# Patient Record
Sex: FEMALE | Race: WHITE | NOT HISPANIC OR LATINO | Employment: OTHER | URBAN - METROPOLITAN AREA
[De-identification: names, ages, dates, MRNs, and addresses within clinical notes are randomized per-mention and may not be internally consistent; named-entity substitution may affect disease eponyms.]

---

## 2023-02-15 PROBLEM — E21.3 HYPERPARATHYROIDISM (MULTI): Status: ACTIVE | Noted: 2023-02-15

## 2023-02-15 PROBLEM — J18.9 PNEUMONIA: Status: ACTIVE | Noted: 2023-02-15

## 2023-02-15 PROBLEM — I35.8 AORTIC HEART MURMUR: Status: ACTIVE | Noted: 2023-02-15

## 2023-02-15 PROBLEM — M54.9 BACK PAIN, CHRONIC: Status: ACTIVE | Noted: 2023-02-15

## 2023-02-15 PROBLEM — E11.9 DIABETES MELLITUS TYPE 2, CONTROLLED (MULTI): Status: ACTIVE | Noted: 2023-02-15

## 2023-02-15 PROBLEM — R51.9 HEADACHE: Status: ACTIVE | Noted: 2023-02-15

## 2023-02-15 PROBLEM — E83.52 HYPERCALCEMIA: Status: ACTIVE | Noted: 2023-02-15

## 2023-02-15 PROBLEM — G89.29 BACK PAIN, CHRONIC: Status: ACTIVE | Noted: 2023-02-15

## 2023-02-15 PROBLEM — R05.9 COUGH: Status: ACTIVE | Noted: 2023-02-15

## 2023-02-15 PROBLEM — D50.9 ANEMIA, IRON DEFICIENCY: Status: ACTIVE | Noted: 2023-02-15

## 2023-02-15 PROBLEM — R11.0 NAUSEA IN ADULT: Status: ACTIVE | Noted: 2023-02-15

## 2023-02-15 PROBLEM — I10 BENIGN ESSENTIAL HYPERTENSION: Status: ACTIVE | Noted: 2023-02-15

## 2023-02-15 PROBLEM — E78.1 HYPERTRIGLYCERIDEMIA: Status: ACTIVE | Noted: 2023-02-15

## 2023-02-15 PROBLEM — D64.9 ANEMIA: Status: ACTIVE | Noted: 2023-02-15

## 2023-02-15 PROBLEM — R09.89 BRUIT OF LEFT CAROTID ARTERY: Status: ACTIVE | Noted: 2023-02-15

## 2023-02-15 PROBLEM — I35.0 AORTIC STENOSIS: Status: ACTIVE | Noted: 2023-02-15

## 2023-02-15 PROBLEM — D68.9 COAGULOPATHY (MULTI): Status: ACTIVE | Noted: 2023-02-15

## 2023-02-15 PROBLEM — I63.9 CVA (CEREBRAL VASCULAR ACCIDENT) (MULTI): Status: ACTIVE | Noted: 2023-02-15

## 2023-02-15 PROBLEM — Z95.2 S/P AVR (AORTIC VALVE REPLACEMENT): Status: ACTIVE | Noted: 2023-02-15

## 2023-02-15 PROBLEM — R35.0 URINARY FREQUENCY: Status: ACTIVE | Noted: 2023-02-15

## 2023-02-15 PROBLEM — Z95.3 H/O HEART VALVE REPLACEMENT WITH BIOPROSTHETIC VALVE: Status: ACTIVE | Noted: 2023-02-15

## 2023-02-15 PROBLEM — R39.15 URINARY URGENCY: Status: ACTIVE | Noted: 2023-02-15

## 2023-02-15 PROBLEM — I48.0 PAROXYSMAL A-FIB (MULTI): Status: ACTIVE | Noted: 2023-02-15

## 2023-02-15 PROBLEM — G31.84 MILD COGNITIVE IMPAIRMENT: Status: ACTIVE | Noted: 2023-02-15

## 2023-02-15 PROBLEM — I25.10 CAD (CORONARY ARTERY DISEASE): Status: ACTIVE | Noted: 2023-02-15

## 2023-02-15 PROBLEM — R06.02 SHORTNESS OF BREATH: Status: ACTIVE | Noted: 2023-02-15

## 2023-02-15 PROBLEM — E78.01 FAMILIAL HYPERCHOLESTEREMIA: Status: ACTIVE | Noted: 2023-02-15

## 2023-02-15 PROBLEM — R79.89 LOW VITAMIN D LEVEL: Status: ACTIVE | Noted: 2023-02-15

## 2023-02-15 PROBLEM — R06.00 DYSPNEA: Status: ACTIVE | Noted: 2023-02-15

## 2023-02-15 PROBLEM — I35.1 AORTIC VALVE INSUFFICIENCY DUE TO INFECTION: Status: ACTIVE | Noted: 2023-02-15

## 2023-02-15 PROBLEM — H53.9 VISION CHANGES: Status: ACTIVE | Noted: 2023-02-15

## 2023-02-15 PROBLEM — I38 ENDOCARDITIS: Status: ACTIVE | Noted: 2023-02-15

## 2023-02-15 PROBLEM — F41.9 ANXIETY: Status: ACTIVE | Noted: 2023-02-15

## 2023-02-15 PROBLEM — N31.9 BLADDER DYSFUNCTION: Status: ACTIVE | Noted: 2023-02-15

## 2023-02-15 PROBLEM — I22.2 SUBSEQUENT NON-ST ELEVATION (NSTEMI) MYOCARDIAL INFARCTION (MULTI): Status: ACTIVE | Noted: 2023-02-15

## 2023-02-15 RX ORDER — FLUTICASONE PROPIONATE 50 UG/1
POWDER, METERED RESPIRATORY (INHALATION) DAILY
COMMUNITY
Start: 2021-04-14 | End: 2023-10-11 | Stop reason: SDUPTHER

## 2023-02-15 RX ORDER — OXYBUTYNIN CHLORIDE 5 MG/1
1 TABLET, EXTENDED RELEASE ORAL DAILY
COMMUNITY
Start: 2016-10-12 | End: 2023-03-29 | Stop reason: SDUPTHER

## 2023-02-15 RX ORDER — FLUTICASONE PROPIONATE 50 MCG
1 SPRAY, SUSPENSION (ML) NASAL DAILY PRN
COMMUNITY
Start: 2021-04-14 | End: 2023-10-11 | Stop reason: ALTCHOICE

## 2023-02-15 RX ORDER — PANTOPRAZOLE SODIUM 40 MG/1
1 TABLET, DELAYED RELEASE ORAL DAILY
COMMUNITY
Start: 2019-11-25 | End: 2023-10-11 | Stop reason: SDUPTHER

## 2023-02-15 RX ORDER — LISINOPRIL 20 MG/1
1 TABLET ORAL DAILY
COMMUNITY
Start: 2020-09-09 | End: 2023-10-11 | Stop reason: SINTOL

## 2023-02-15 RX ORDER — ROSUVASTATIN CALCIUM 40 MG/1
1 TABLET, COATED ORAL NIGHTLY
COMMUNITY
Start: 2021-11-16 | End: 2023-11-06

## 2023-02-15 RX ORDER — METOPROLOL TARTRATE 25 MG/1
0.5 TABLET, FILM COATED ORAL EVERY 12 HOURS
COMMUNITY
Start: 2020-09-09

## 2023-02-15 RX ORDER — WARFARIN SODIUM 5 MG/1
1 TABLET ORAL
COMMUNITY
Start: 2020-09-09 | End: 2023-07-27 | Stop reason: SDUPTHER

## 2023-02-15 RX ORDER — FOLIC ACID 1 MG/1
1 TABLET ORAL DAILY
COMMUNITY
Start: 2020-09-09

## 2023-02-15 RX ORDER — ASPIRIN 81 MG/1
1 TABLET ORAL DAILY
COMMUNITY
Start: 2020-08-26 | End: 2023-10-11 | Stop reason: SDUPTHER

## 2023-02-15 RX ORDER — ACETAMINOPHEN 500 MG
2 TABLET ORAL EVERY 6 HOURS PRN
COMMUNITY
Start: 2020-03-25

## 2023-02-15 RX ORDER — WARFARIN 2.5 MG/1
1 TABLET ORAL
COMMUNITY
Start: 2020-12-30 | End: 2023-07-27 | Stop reason: ALTCHOICE

## 2023-02-15 RX ORDER — ESCITALOPRAM OXALATE 10 MG/1
1 TABLET ORAL DAILY
COMMUNITY
Start: 2016-08-17 | End: 2023-06-06 | Stop reason: SDUPTHER

## 2023-02-15 RX ORDER — PHENOL 1.4 %
1 AEROSOL, SPRAY (ML) MUCOUS MEMBRANE DAILY
COMMUNITY
Start: 2020-09-09

## 2023-03-29 ENCOUNTER — OFFICE VISIT (OUTPATIENT)
Dept: PRIMARY CARE | Facility: CLINIC | Age: 73
End: 2023-03-29
Payer: MEDICARE

## 2023-03-29 VITALS
DIASTOLIC BLOOD PRESSURE: 82 MMHG | BODY MASS INDEX: 28.28 KG/M2 | WEIGHT: 176 LBS | SYSTOLIC BLOOD PRESSURE: 160 MMHG | HEIGHT: 66 IN

## 2023-03-29 DIAGNOSIS — Z00.00 ROUTINE GENERAL MEDICAL EXAMINATION AT HEALTH CARE FACILITY: Primary | ICD-10-CM

## 2023-03-29 DIAGNOSIS — I10 PRIMARY HYPERTENSION: ICD-10-CM

## 2023-03-29 DIAGNOSIS — E21.3 HYPERPARATHYROIDISM (MULTI): ICD-10-CM

## 2023-03-29 DIAGNOSIS — R35.0 URINARY FREQUENCY: ICD-10-CM

## 2023-03-29 DIAGNOSIS — D68.32 HEMORRHAGIC DISORDER DUE TO EXTRINSIC CIRCULATING ANTICOAGULANTS (MULTI): ICD-10-CM

## 2023-03-29 DIAGNOSIS — I10 BENIGN ESSENTIAL HYPERTENSION: ICD-10-CM

## 2023-03-29 DIAGNOSIS — I48.0 PAROXYSMAL A-FIB (MULTI): ICD-10-CM

## 2023-03-29 DIAGNOSIS — E11.9 CONTROLLED TYPE 2 DIABETES MELLITUS WITHOUT COMPLICATION, WITHOUT LONG-TERM CURRENT USE OF INSULIN (MULTI): ICD-10-CM

## 2023-03-29 DIAGNOSIS — Z00.00 ENCOUNTER FOR ANNUAL WELLNESS EXAM IN MEDICARE PATIENT: ICD-10-CM

## 2023-03-29 PROCEDURE — 3077F SYST BP >= 140 MM HG: CPT | Performed by: STUDENT IN AN ORGANIZED HEALTH CARE EDUCATION/TRAINING PROGRAM

## 2023-03-29 PROCEDURE — 1159F MED LIST DOCD IN RCRD: CPT | Performed by: STUDENT IN AN ORGANIZED HEALTH CARE EDUCATION/TRAINING PROGRAM

## 2023-03-29 PROCEDURE — 1036F TOBACCO NON-USER: CPT | Performed by: STUDENT IN AN ORGANIZED HEALTH CARE EDUCATION/TRAINING PROGRAM

## 2023-03-29 PROCEDURE — 3079F DIAST BP 80-89 MM HG: CPT | Performed by: STUDENT IN AN ORGANIZED HEALTH CARE EDUCATION/TRAINING PROGRAM

## 2023-03-29 PROCEDURE — 1170F FXNL STATUS ASSESSED: CPT | Performed by: STUDENT IN AN ORGANIZED HEALTH CARE EDUCATION/TRAINING PROGRAM

## 2023-03-29 PROCEDURE — 99214 OFFICE O/P EST MOD 30 MIN: CPT | Performed by: STUDENT IN AN ORGANIZED HEALTH CARE EDUCATION/TRAINING PROGRAM

## 2023-03-29 PROCEDURE — G0439 PPPS, SUBSEQ VISIT: HCPCS | Performed by: STUDENT IN AN ORGANIZED HEALTH CARE EDUCATION/TRAINING PROGRAM

## 2023-03-29 PROCEDURE — 1160F RVW MEDS BY RX/DR IN RCRD: CPT | Performed by: STUDENT IN AN ORGANIZED HEALTH CARE EDUCATION/TRAINING PROGRAM

## 2023-03-29 PROCEDURE — 4010F ACE/ARB THERAPY RXD/TAKEN: CPT | Performed by: STUDENT IN AN ORGANIZED HEALTH CARE EDUCATION/TRAINING PROGRAM

## 2023-03-29 RX ORDER — OXYBUTYNIN CHLORIDE 5 MG/1
5 TABLET, EXTENDED RELEASE ORAL DAILY
Qty: 90 TABLET | Refills: 1 | Status: SHIPPED | OUTPATIENT
Start: 2023-03-29 | End: 2023-07-27 | Stop reason: SDUPTHER

## 2023-03-29 RX ORDER — AMLODIPINE BESYLATE 5 MG/1
5 TABLET ORAL DAILY
Qty: 90 TABLET | Refills: 1 | Status: SHIPPED | OUTPATIENT
Start: 2023-03-29 | End: 2023-07-27 | Stop reason: DRUGHIGH

## 2023-03-29 ASSESSMENT — ENCOUNTER SYMPTOMS
LOSS OF SENSATION IN FEET: 0
OCCASIONAL FEELINGS OF UNSTEADINESS: 0
DEPRESSION: 0

## 2023-03-29 ASSESSMENT — ACTIVITIES OF DAILY LIVING (ADL)
BATHING: INDEPENDENT
TAKING_MEDICATION: INDEPENDENT
DRESSING: INDEPENDENT
DOING_HOUSEWORK: INDEPENDENT
GROCERY_SHOPPING: INDEPENDENT
MANAGING_FINANCES: INDEPENDENT

## 2023-03-29 NOTE — PROGRESS NOTES
"Subjective   Patient ID: Mario Alberto Pena is a 72 y.o. female who presents for Medicare Annual Wellness Visit Subsequent (MED REFILL).    HPI     Review of Systems    Objective   /82   Ht 1.676 m (5' 6\")   Wt 79.8 kg (176 lb)   BMI 28.41 kg/m²     Physical Exam    Assessment/Plan          "

## 2023-03-29 NOTE — PROGRESS NOTES
"Subjective   Reason for Visit: Mario Alberto Pena is an 72 y.o. female here for a Medicare Wellness visit.     Past Medical, Surgical, and Family History reviewed and updated in chart.    Reviewed all medications by prescribing practitioner or clinical pharmacist (such as prescriptions, OTCs, herbal therapies and supplements) and documented in the medical record.    HPI  Routine fu.  Med refill.  She is generally feeling OK.  Her  had had cancer, has finished chemo and XRT.  Patient denies falls.  BP is running high at home, always 140s or higher systolic  Patient Care Team:  Jigar Hurst MD as PCP - General  Jigar Hurst MD as PCP - [employer] PCP     Review of Systems  12-point ROS reviewed and was negative unless otherwise noted in HPI.    Objective   Vitals:  /82   Ht 1.676 m (5' 6\")   Wt 79.8 kg (176 lb)   BMI 28.41 kg/m²       Physical Exam  GEN: conversant, NAD  HEENT: PERRL, EOMI, wearing a mask  NECK: supple, no carotid bruits appreciated b/l  CV: S1, S2, RRR  PULM: CTAB  ABD: soft, NT, ND  NEURO: no new gross focal deficits  EXT: no sig LE edema  PSYCH: appropriate affect    Assessment/Plan   Problem List Items Addressed This Visit          Other    Urinary frequency    Relevant Medications    oxybutynin XL (Ditropan-XL) 5 mg 24 hr tablet     Other Visit Diagnoses       Routine general medical examination at health care facility    -  Primary          #Hypertension: above goal. Start amlodipine 5mg daily, discussed SE profile. Fu 3 mo    #hypercalcemia/primary hyperparathyroidism: seeing endocrinology, endocrine surgery evaluated the patient, no indication for surgical intervention at this time, per their report. Advised to continue off Calcium/vitamin D supplementation and follow up with Endocrinology.     #Prosthetic valve endocarditis:   #paroxysmal AFib  -s/p surgical repeat AVR (2020). Follows with Cardiology.   -Continue warfarin. Interval specialist notes " reviewed. Checking her INR at home  -gets prophylaxis for dental procedures      #CAD s/p NSTEMI: stent placed to LAD 2019. Follows with Cardiology     #DM2: HgbA1c: Discussed lifestyle modifications. Continue diet and exercise and weight loss. Seeing endocrinology.     #Anxiety: Mood stable. Continue escitalopram     #h/o Stroke: Continue statin. Continue warfarin. Follows with Neurology.      #HLD: Currently on atorvastatin. Continue fish oil.     #Health maintenance:   -Mammogram done 5/2022, repeat ordered  - Colonoscopy and EGD 11/2019, repeat recommended for 2024.   - Advised Shingrix.   - Advised yearly flu shot  - Advised GYN eval.   - Seeing ophthalmology and dentist annually   - UTD with PCV13 (2016) and PPSV23 (2015), advised to get Prevnar 20.  - Received COVID vaccines, booster  - TDaP in 2013, due in 2023      RTC 3 mo

## 2023-04-05 LAB
ANION GAP IN SER/PLAS: 13 MMOL/L (ref 10–20)
CALCIUM (MG/DL) IN SER/PLAS: 10.5 MG/DL (ref 8.6–10.3)
CARBON DIOXIDE, TOTAL (MMOL/L) IN SER/PLAS: 22 MMOL/L (ref 21–32)
CHLORIDE (MMOL/L) IN SER/PLAS: 106 MMOL/L (ref 98–107)
CREATININE (MG/DL) IN SER/PLAS: 1.09 MG/DL (ref 0.5–1.05)
GFR FEMALE: 53 ML/MIN/1.73M2
GLUCOSE (MG/DL) IN SER/PLAS: 98 MG/DL (ref 74–99)
POTASSIUM (MMOL/L) IN SER/PLAS: 4.5 MMOL/L (ref 3.5–5.3)
SODIUM (MMOL/L) IN SER/PLAS: 136 MMOL/L (ref 136–145)
UREA NITROGEN (MG/DL) IN SER/PLAS: 24 MG/DL (ref 6–23)

## 2023-05-08 LAB
ANION GAP IN SER/PLAS: 16 MMOL/L (ref 10–20)
CALCIUM (MG/DL) IN SER/PLAS: 11.5 MG/DL (ref 8.6–10.6)
CARBON DIOXIDE, TOTAL (MMOL/L) IN SER/PLAS: 23 MMOL/L (ref 21–32)
CHLORIDE (MMOL/L) IN SER/PLAS: 104 MMOL/L (ref 98–107)
CREATININE (MG/DL) IN SER/PLAS: 1.48 MG/DL (ref 0.5–1.05)
GFR FEMALE: 37 ML/MIN/1.73M2
GLUCOSE (MG/DL) IN SER/PLAS: 115 MG/DL (ref 74–99)
POTASSIUM (MMOL/L) IN SER/PLAS: 5.2 MMOL/L (ref 3.5–5.3)
SODIUM (MMOL/L) IN SER/PLAS: 138 MMOL/L (ref 136–145)
UREA NITROGEN (MG/DL) IN SER/PLAS: 37 MG/DL (ref 6–23)

## 2023-05-23 LAB
ANION GAP IN SER/PLAS: 12 MMOL/L (ref 10–20)
CALCIUM (MG/DL) IN SER/PLAS: 11.3 MG/DL (ref 8.6–10.6)
CARBON DIOXIDE, TOTAL (MMOL/L) IN SER/PLAS: 26 MMOL/L (ref 21–32)
CHLORIDE (MMOL/L) IN SER/PLAS: 107 MMOL/L (ref 98–107)
CREATININE (MG/DL) IN SER/PLAS: 0.95 MG/DL (ref 0.5–1.05)
GFR FEMALE: 63 ML/MIN/1.73M2
GLUCOSE (MG/DL) IN SER/PLAS: 117 MG/DL (ref 74–99)
POTASSIUM (MMOL/L) IN SER/PLAS: 5.1 MMOL/L (ref 3.5–5.3)
SODIUM (MMOL/L) IN SER/PLAS: 140 MMOL/L (ref 136–145)
UREA NITROGEN (MG/DL) IN SER/PLAS: 22 MG/DL (ref 6–23)

## 2023-06-06 DIAGNOSIS — F41.9 ANXIETY: Primary | ICD-10-CM

## 2023-06-06 RX ORDER — ESCITALOPRAM OXALATE 10 MG/1
10 TABLET ORAL DAILY
Qty: 90 TABLET | Refills: 1 | Status: SHIPPED | OUTPATIENT
Start: 2023-06-06 | End: 2023-07-27 | Stop reason: SDUPTHER

## 2023-07-12 LAB
ALBUMIN (G/DL) IN SER/PLAS: 4.8 G/DL (ref 3.4–5)
CALCIDIOL (25 OH VITAMIN D3) (NG/ML) IN SER/PLAS: 31 NG/ML
CALCIUM (MG/DL) IN SER/PLAS: 11 MG/DL (ref 8.6–10.6)
CREATININE (MG/DL) IN SER/PLAS: 1.12 MG/DL (ref 0.5–1.05)
GFR FEMALE: 52 ML/MIN/1.73M2
MAGNESIUM (MG/DL) IN SER/PLAS: 1.81 MG/DL (ref 1.6–2.4)
PARATHYRIN INTACT (PG/ML) IN SER/PLAS: 138 PG/ML (ref 18.5–88)
PHOSPHATE (MG/DL) IN SER/PLAS: 2.9 MG/DL (ref 2.5–4.9)
POC CALCIUM IONIZED (MMOL/L) IN BLOOD: 1.51 MMOL/L (ref 1.1–1.33)

## 2023-07-13 LAB
CALCIUM (MG/DL) IN URINE: 4.9 MG/DL
CALCIUM (MG/L) IN 24 HOUR URINE: 77 MG/24H (ref 100–300)
COLLECTION DURATION OF URINE: 24 HR
CREATININE (MG/24HR) IN 24 HOUR URINE: 1.19 G/24H (ref 0.67–1.59)
CREATININE (MG/DL) IN URINE: 75.5 MG/DL (ref 20–320)
VOLUME OF URINE: 1570 ML

## 2023-07-14 LAB — VITAMIN D 1,25-DIHYDROXY: 35.4 PG/ML (ref 19.9–79.3)

## 2023-07-27 ENCOUNTER — OFFICE VISIT (OUTPATIENT)
Dept: PRIMARY CARE | Facility: CLINIC | Age: 73
End: 2023-07-27
Payer: MEDICARE

## 2023-07-27 VITALS
WEIGHT: 176 LBS | SYSTOLIC BLOOD PRESSURE: 136 MMHG | BODY MASS INDEX: 28.28 KG/M2 | DIASTOLIC BLOOD PRESSURE: 78 MMHG | HEIGHT: 66 IN

## 2023-07-27 DIAGNOSIS — I10 BENIGN ESSENTIAL HYPERTENSION: ICD-10-CM

## 2023-07-27 DIAGNOSIS — R05.3 CHRONIC COUGH: ICD-10-CM

## 2023-07-27 DIAGNOSIS — I48.91 ATRIAL FIBRILLATION, UNSPECIFIED TYPE (MULTI): Primary | ICD-10-CM

## 2023-07-27 DIAGNOSIS — Z00.00 HEALTHCARE MAINTENANCE: ICD-10-CM

## 2023-07-27 DIAGNOSIS — F41.9 ANXIETY: ICD-10-CM

## 2023-07-27 DIAGNOSIS — R35.0 URINARY FREQUENCY: ICD-10-CM

## 2023-07-27 DIAGNOSIS — I25.10 CORONARY ARTERY DISEASE INVOLVING NATIVE HEART WITHOUT ANGINA PECTORIS, UNSPECIFIED VESSEL OR LESION TYPE: ICD-10-CM

## 2023-07-27 DIAGNOSIS — E11.8 CONTROLLED TYPE 2 DIABETES MELLITUS WITH COMPLICATION, WITHOUT LONG-TERM CURRENT USE OF INSULIN (MULTI): ICD-10-CM

## 2023-07-27 PROCEDURE — 1160F RVW MEDS BY RX/DR IN RCRD: CPT | Performed by: STUDENT IN AN ORGANIZED HEALTH CARE EDUCATION/TRAINING PROGRAM

## 2023-07-27 PROCEDURE — 1036F TOBACCO NON-USER: CPT | Performed by: STUDENT IN AN ORGANIZED HEALTH CARE EDUCATION/TRAINING PROGRAM

## 2023-07-27 PROCEDURE — 99214 OFFICE O/P EST MOD 30 MIN: CPT | Performed by: STUDENT IN AN ORGANIZED HEALTH CARE EDUCATION/TRAINING PROGRAM

## 2023-07-27 PROCEDURE — 90471 IMMUNIZATION ADMIN: CPT | Performed by: STUDENT IN AN ORGANIZED HEALTH CARE EDUCATION/TRAINING PROGRAM

## 2023-07-27 PROCEDURE — 3075F SYST BP GE 130 - 139MM HG: CPT | Performed by: STUDENT IN AN ORGANIZED HEALTH CARE EDUCATION/TRAINING PROGRAM

## 2023-07-27 PROCEDURE — 3078F DIAST BP <80 MM HG: CPT | Performed by: STUDENT IN AN ORGANIZED HEALTH CARE EDUCATION/TRAINING PROGRAM

## 2023-07-27 PROCEDURE — 90715 TDAP VACCINE 7 YRS/> IM: CPT | Performed by: STUDENT IN AN ORGANIZED HEALTH CARE EDUCATION/TRAINING PROGRAM

## 2023-07-27 PROCEDURE — 1159F MED LIST DOCD IN RCRD: CPT | Performed by: STUDENT IN AN ORGANIZED HEALTH CARE EDUCATION/TRAINING PROGRAM

## 2023-07-27 PROCEDURE — 1126F AMNT PAIN NOTED NONE PRSNT: CPT | Performed by: STUDENT IN AN ORGANIZED HEALTH CARE EDUCATION/TRAINING PROGRAM

## 2023-07-27 PROCEDURE — 4010F ACE/ARB THERAPY RXD/TAKEN: CPT | Performed by: STUDENT IN AN ORGANIZED HEALTH CARE EDUCATION/TRAINING PROGRAM

## 2023-07-27 RX ORDER — ESCITALOPRAM OXALATE 10 MG/1
10 TABLET ORAL DAILY
Qty: 90 TABLET | Refills: 1 | Status: SHIPPED | OUTPATIENT
Start: 2023-07-27 | End: 2024-02-29 | Stop reason: SDUPTHER

## 2023-07-27 RX ORDER — OXYBUTYNIN CHLORIDE 5 MG/1
5 TABLET, EXTENDED RELEASE ORAL DAILY
Qty: 90 TABLET | Refills: 1 | Status: SHIPPED | OUTPATIENT
Start: 2023-07-27 | End: 2024-02-29 | Stop reason: SDUPTHER

## 2023-07-27 RX ORDER — AMLODIPINE BESYLATE 10 MG/1
10 TABLET ORAL DAILY
COMMUNITY
End: 2024-04-19 | Stop reason: SDUPTHER

## 2023-07-27 RX ORDER — WARFARIN 2.5 MG/1
TABLET ORAL
Qty: 90 TABLET | Refills: 1 | Status: SHIPPED | OUTPATIENT
Start: 2023-07-27 | End: 2024-02-29 | Stop reason: SDUPTHER

## 2023-07-27 RX ORDER — BENZONATATE 100 MG/1
100 CAPSULE ORAL 2 TIMES DAILY PRN
Qty: 180 CAPSULE | Refills: 1 | Status: SHIPPED | OUTPATIENT
Start: 2023-07-27 | End: 2024-01-23

## 2023-07-27 RX ORDER — WARFARIN SODIUM 5 MG/1
5 TABLET ORAL NIGHTLY
Qty: 90 TABLET | Refills: 1 | Status: SHIPPED | OUTPATIENT
Start: 2023-07-27 | End: 2024-02-29 | Stop reason: SDUPTHER

## 2023-07-27 NOTE — PROGRESS NOTES
"Subjective   Patient ID: Mario Alberto Pena is a 72 y.o. female who presents for Follow-up (Med refill).    HPI   Routine fu.  She feels a bit fatigued at times. Trying to walk daily for exercise.   Review of Systems  12-point ROS reviewed and was negative unless otherwise noted in HPI.    Objective   /78   Ht 1.676 m (5' 6\")   Wt 79.8 kg (176 lb)   BMI 28.41 kg/m²     Physical Exam  GEN: conversant, NAD  HEENT: PERRL, EOMI, MMM  NECK: supple, no carotid bruits appreciated b/l  CV: S1, S2, irregularly irregular rhythm, regular rate  PULM: CTAB  ABD: soft, NT, ND  NEURO: no new gross focal deficits  EXT: no sig LE edema  PSYCH: appropriate affect    Assessment/Plan     #Hypertension: continue amlodipine 10mg daily     #hypercalcemia/primary hyperparathyroidism: seeing endocrinology, endocrine surgery evaluated the patient, no indication for surgical intervention at this time, per their report. Advised to continue off Calcium/vitamin D supplementation and follow up with Endocrinology.     #Prosthetic valve endocarditis:   #paroxysmal AFib  -s/p surgical repeat AVR (2020). Follows with Cardiology.   -Continue warfarin. Interval specialist notes reviewed. Checking her INR at home  -gets prophylaxis for dental procedures      #CAD s/p NSTEMI: stent placed to LAD 2019. Follows with Cardiology     #DM2: HgbA1c: Discussed lifestyle modifications. Continue diet and exercise and weight loss. Seeing endocrinology.     #Anxiety: Mood stable. Continue escitalopram     #h/o Stroke: Continue statin. Continue warfarin. Follows with Neurology.      #HLD: Currently on atorvastatin. Continue fish oil.     #Health maintenance:   -Mammogram done 3/2023  - Colonoscopy and EGD 11/2019, repeat recommended for 2024.   - Advised Shingrix.   - Advised yearly flu shot  - Advised GYN eval.   - Seeing ophthalmology and dentist annually   - UTD with PCV13 (2016) and PPSV23 (2015), advised to get Prevnar 20.  - Received COVID vaccines, " booster  - TDaP given in 2023      RTC 3 mo

## 2023-09-06 PROBLEM — H01.02A SQUAMOUS BLEPHARITIS OF UPPER AND LOWER EYELIDS OF BOTH EYES: Status: ACTIVE | Noted: 2019-04-24

## 2023-09-06 PROBLEM — D72.10 EOSINOPHILIA: Status: ACTIVE | Noted: 2022-07-11

## 2023-09-06 PROBLEM — H52.7 REFRACTION ERROR: Status: ACTIVE | Noted: 2019-04-24

## 2023-09-06 PROBLEM — Z79.01 LONG TERM (CURRENT) USE OF ANTICOAGULANTS: Status: ACTIVE | Noted: 2022-07-11

## 2023-09-06 PROBLEM — H43.393 VITREOUS SYNERESIS OF BOTH EYES: Status: ACTIVE | Noted: 2019-04-24

## 2023-09-06 PROBLEM — H16.223 KERATOCONJUNCTIVITIS SICCA, NOT SPECIFIED AS SJOGREN'S, BILATERAL: Status: ACTIVE | Noted: 2019-04-24

## 2023-09-06 PROBLEM — I47.20 VENTRICULAR TACHYCARDIA (MULTI): Status: ACTIVE | Noted: 2022-07-11

## 2023-09-06 PROBLEM — E78.5 HYPERLIPIDEMIA, UNSPECIFIED: Status: ACTIVE | Noted: 2022-07-11

## 2023-09-06 PROBLEM — H01.02B SQUAMOUS BLEPHARITIS OF UPPER AND LOWER EYELIDS OF BOTH EYES: Status: ACTIVE | Noted: 2019-04-24

## 2023-09-06 PROBLEM — D46.9 MYELODYSPLASTIC SYNDROME (MULTI): Status: ACTIVE | Noted: 2022-07-11

## 2023-09-06 PROBLEM — K21.9 GASTROESOPHAGEAL REFLUX DISEASE WITHOUT ESOPHAGITIS: Status: ACTIVE | Noted: 2022-07-11

## 2023-09-06 PROBLEM — D69.6 THROMBOCYTOPENIA (CMS-HCC): Status: ACTIVE | Noted: 2022-07-11

## 2023-09-06 PROBLEM — E61.2 MAGNESIUM DEFICIENCY: Status: ACTIVE | Noted: 2022-07-11

## 2023-09-06 PROBLEM — H25.013 CORTICAL SENILE CATARACT OF BOTH EYES: Status: ACTIVE | Noted: 2019-04-24

## 2023-09-06 RX ORDER — DOCUSATE SODIUM 100 MG/1
1 CAPSULE, LIQUID FILLED ORAL 2 TIMES DAILY PRN
COMMUNITY
Start: 2020-09-07

## 2023-09-06 RX ORDER — CHLORTHALIDONE 25 MG/1
25 TABLET ORAL DAILY
COMMUNITY
Start: 2023-05-12 | End: 2023-10-11 | Stop reason: ALTCHOICE

## 2023-09-06 RX ORDER — POLYETHYLENE GLYCOL 3350 17 G/17G
17 POWDER, FOR SOLUTION ORAL DAILY PRN
COMMUNITY
Start: 2020-09-07 | End: 2023-10-11 | Stop reason: ALTCHOICE

## 2023-09-06 RX ORDER — IPRATROPIUM BROMIDE AND ALBUTEROL SULFATE 2.5; .5 MG/3ML; MG/3ML
3 SOLUTION RESPIRATORY (INHALATION) EVERY 2 HOUR PRN
COMMUNITY
Start: 2022-07-15

## 2023-09-06 RX ORDER — SIMVASTATIN 40 MG/1
1 TABLET, FILM COATED ORAL DAILY
COMMUNITY
Start: 2009-08-17 | End: 2023-10-11

## 2023-09-06 RX ORDER — FLUTICASONE PROPIONATE 100 UG/1
POWDER, METERED RESPIRATORY (INHALATION) AS NEEDED
COMMUNITY
Start: 2021-04-14 | End: 2023-10-11 | Stop reason: ALTCHOICE

## 2023-09-06 RX ORDER — CLINDAMYCIN HYDROCHLORIDE 300 MG/1
2 CAPSULE ORAL ONCE
COMMUNITY
Start: 2021-08-11

## 2023-09-06 RX ORDER — GUAIFENESIN 600 MG/1
1 TABLET, EXTENDED RELEASE ORAL EVERY 12 HOURS
COMMUNITY
Start: 2022-07-15 | End: 2023-10-11 | Stop reason: ALTCHOICE

## 2023-09-06 RX ORDER — NAPROXEN SODIUM 220 MG/1
1 TABLET, FILM COATED ORAL DAILY
COMMUNITY
Start: 2022-07-22

## 2023-09-06 RX ORDER — ALBUTEROL SULFATE 90 UG/1
2 AEROSOL, METERED RESPIRATORY (INHALATION) EVERY 4 HOURS PRN
COMMUNITY
Start: 2017-03-04

## 2023-09-06 RX ORDER — ELECTROLYTES/DEXTROSE
1 SOLUTION, ORAL ORAL 2 TIMES DAILY
COMMUNITY
Start: 2022-07-22 | End: 2023-10-11 | Stop reason: ALTCHOICE

## 2023-09-06 RX ORDER — ACETAMINOPHEN 500 MG
1 TABLET ORAL DAILY
COMMUNITY
End: 2023-10-11 | Stop reason: ALTCHOICE

## 2023-09-06 RX ORDER — WARFARIN 10 MG/1
1 TABLET ORAL DAILY
COMMUNITY
Start: 2010-10-22

## 2023-09-06 RX ORDER — ACETAMINOPHEN 325 MG/1
2 TABLET ORAL EVERY 4 HOURS PRN
COMMUNITY
Start: 2022-07-15

## 2023-09-06 RX ORDER — POTASSIUM CHLORIDE 750 MG/1
1 TABLET, FILM COATED, EXTENDED RELEASE ORAL 2 TIMES DAILY
COMMUNITY
Start: 2009-08-17 | End: 2023-10-11 | Stop reason: ALTCHOICE

## 2023-09-06 RX ORDER — DIAZEPAM 5 MG/1
1 TABLET ORAL DAILY
COMMUNITY
Start: 2009-08-17 | End: 2023-10-11 | Stop reason: ALTCHOICE

## 2023-09-06 RX ORDER — TIZANIDINE 2 MG/1
TABLET ORAL
COMMUNITY
Start: 2019-04-11 | End: 2023-10-11 | Stop reason: ALTCHOICE

## 2023-09-06 RX ORDER — CHLORHEXIDINE GLUCONATE 4 %
1 LIQUID (ML) TOPICAL 2 TIMES DAILY
COMMUNITY
End: 2023-10-11 | Stop reason: ALTCHOICE

## 2023-09-06 RX ORDER — BENZONATATE 100 MG/1
1 CAPSULE ORAL 3 TIMES DAILY PRN
COMMUNITY
Start: 2022-07-15 | End: 2023-10-11 | Stop reason: SDUPTHER

## 2023-09-06 RX ORDER — ATORVASTATIN CALCIUM 40 MG/1
40 TABLET, FILM COATED ORAL DAILY
COMMUNITY
Start: 2019-02-14 | End: 2023-10-11 | Stop reason: ALTCHOICE

## 2023-09-06 RX ORDER — LISINOPRIL 10 MG/1
TABLET ORAL
COMMUNITY
Start: 2019-03-19 | End: 2023-10-11 | Stop reason: SDUPTHER

## 2023-09-06 RX ORDER — BUDESONIDE 0.5 MG/2ML
2 INHALANT ORAL EVERY 12 HOURS
COMMUNITY
Start: 2022-07-15 | End: 2023-10-11 | Stop reason: SDUPTHER

## 2023-09-18 DIAGNOSIS — Z79.01 LONG TERM (CURRENT) USE OF ANTICOAGULANTS: ICD-10-CM

## 2023-09-18 DIAGNOSIS — I48.11 LONGSTANDING PERSISTENT ATRIAL FIBRILLATION (MULTI): ICD-10-CM

## 2023-09-18 DIAGNOSIS — Z95.3 H/O HEART VALVE REPLACEMENT WITH BIOPROSTHETIC VALVE: Primary | ICD-10-CM

## 2023-09-18 LAB
INR IN PPP BY COAGULATION ASSAY EXTERNAL: 2.5
PROTHROMBIN TIME (PT) IN PPP BY COAGULATION ASSAY EXTERNAL: NORMAL SECONDS

## 2023-10-03 ENCOUNTER — ANTICOAGULATION - WARFARIN VISIT (OUTPATIENT)
Dept: CARDIOLOGY | Facility: CLINIC | Age: 73
End: 2023-10-03
Payer: MEDICARE

## 2023-10-03 DIAGNOSIS — Z79.01 LONG TERM (CURRENT) USE OF ANTICOAGULANTS: ICD-10-CM

## 2023-10-03 DIAGNOSIS — Z95.3 H/O HEART VALVE REPLACEMENT WITH BIOPROSTHETIC VALVE: Primary | ICD-10-CM

## 2023-10-03 DIAGNOSIS — I48.11 LONGSTANDING PERSISTENT ATRIAL FIBRILLATION (MULTI): ICD-10-CM

## 2023-10-03 LAB
INR IN PPP BY COAGULATION ASSAY EXTERNAL: 2.1
PROTHROMBIN TIME (PT) IN PPP BY COAGULATION ASSAY EXTERNAL: NORMAL SECONDS

## 2023-10-03 NOTE — PROGRESS NOTES
Patient identification verified with 2 identifiers.    Location: [facility] Patient Self-Testing Program [phone]    Referring Physician: Dr. Silas Lawson  Enrollment/ Re-enrollment date: 24   INR Goal: 2.0-3.0  INR monitoring is per [facility] protocol.  Anticoagulation Medication: warfarin  Indication: atrial fibrillation    Subjective   Bleeding signs/symptoms: No    Bruising: No   Major bleeding event: No  Thrombosis signs/symptoms: No  Thromboembolic event: No  Missed doses: No  Extra doses: No  Medication changes: No  Dietary changes: No  Change in health: No  Change in activity: No  Alcohol: No  Other concerns: No    Upcoming Surgeries:  Does the Patient Have any upcoming surgeries that require interruption in anticoagulation therapy? no  Does the patient require bridging? no      Anticoagulation Summary  As of 10/3/2023      INR goal:  2.0-3.0   TTR:  100.0 % (5 d)   INR used for dosin.10 (10/3/2023)   Weekly warfarin total:  50 mg               Assessment/Plan   Therapeutic     1. New dose: no change    2. Next INR: 2 weeks    Received faxed INR result of 2.1 from today. Called patient, verified ID with name and . Patient stated correct dose was taken. She correctly read back dosing instructions. Scheduled patient for Annual PST Meter review at [place] on 10/18.       Education provided to patient during the visit:  Patient instructed to call in interim with questions, concerns and changes.

## 2023-10-11 ENCOUNTER — OFFICE VISIT (OUTPATIENT)
Dept: CARDIOLOGY | Facility: HOSPITAL | Age: 73
End: 2023-10-11
Payer: MEDICARE

## 2023-10-11 VITALS
DIASTOLIC BLOOD PRESSURE: 72 MMHG | BODY MASS INDEX: 28.77 KG/M2 | OXYGEN SATURATION: 98 % | HEIGHT: 66 IN | SYSTOLIC BLOOD PRESSURE: 110 MMHG | WEIGHT: 179 LBS | HEART RATE: 71 BPM

## 2023-10-11 DIAGNOSIS — I10 BENIGN ESSENTIAL HYPERTENSION: ICD-10-CM

## 2023-10-11 DIAGNOSIS — I48.0 PAROXYSMAL A-FIB (MULTI): Primary | ICD-10-CM

## 2023-10-11 DIAGNOSIS — Z95.2 S/P AVR (AORTIC VALVE REPLACEMENT): ICD-10-CM

## 2023-10-11 PROCEDURE — 1160F RVW MEDS BY RX/DR IN RCRD: CPT | Performed by: NURSE PRACTITIONER

## 2023-10-11 PROCEDURE — 93005 ELECTROCARDIOGRAM TRACING: CPT | Performed by: NURSE PRACTITIONER

## 2023-10-11 PROCEDURE — 3074F SYST BP LT 130 MM HG: CPT | Performed by: NURSE PRACTITIONER

## 2023-10-11 PROCEDURE — 1159F MED LIST DOCD IN RCRD: CPT | Performed by: NURSE PRACTITIONER

## 2023-10-11 PROCEDURE — 1036F TOBACCO NON-USER: CPT | Performed by: NURSE PRACTITIONER

## 2023-10-11 PROCEDURE — 3078F DIAST BP <80 MM HG: CPT | Performed by: NURSE PRACTITIONER

## 2023-10-11 PROCEDURE — 99214 OFFICE O/P EST MOD 30 MIN: CPT | Performed by: NURSE PRACTITIONER

## 2023-10-11 PROCEDURE — 1126F AMNT PAIN NOTED NONE PRSNT: CPT | Performed by: NURSE PRACTITIONER

## 2023-10-11 PROCEDURE — 4010F ACE/ARB THERAPY RXD/TAKEN: CPT | Performed by: NURSE PRACTITIONER

## 2023-10-11 RX ORDER — PANTOPRAZOLE SODIUM 40 MG/1
40 TABLET, DELAYED RELEASE ORAL
Qty: 90 TABLET | Refills: 3 | Status: SHIPPED | OUTPATIENT
Start: 2023-10-11

## 2023-10-11 RX ORDER — LOSARTAN POTASSIUM 25 MG/1
25 TABLET ORAL DAILY
Qty: 90 TABLET | Refills: 3 | Status: SHIPPED | OUTPATIENT
Start: 2023-10-11 | End: 2024-10-10

## 2023-10-11 ASSESSMENT — PATIENT HEALTH QUESTIONNAIRE - PHQ9
1. LITTLE INTEREST OR PLEASURE IN DOING THINGS: NOT AT ALL
2. FEELING DOWN, DEPRESSED OR HOPELESS: NOT AT ALL
SUM OF ALL RESPONSES TO PHQ9 QUESTIONS 1 & 2: 0

## 2023-10-11 ASSESSMENT — ENCOUNTER SYMPTOMS
LOSS OF SENSATION IN FEET: 0
DEPRESSION: 0
OCCASIONAL FEELINGS OF UNSTEADINESS: 0

## 2023-10-11 NOTE — PATIENT INSTRUCTIONS
Stop Lisinopril   Start Losartan 25 mg once a day  Check blood work in one week- CBC, BMP, lipid panel, TSH  Follow up in 6 months

## 2023-10-11 NOTE — PROGRESS NOTES
Primary Care Physician: Jigar Hurst MD  Date of Visit: 10/11/2023  1:00 PM EDT  Location of visit: [facility]     Chief Complaint:   Chief Complaint   Patient presents with    Follow-up     6 month    Atrial Fibrillation    Coronary Artery Disease    Valve Disorder     AVR        HPI / Summary:   Mario Alberto Pena is a 73 y.o. female presents for followup. Seen in collaboration with Dr. Lawson. She is overall feeling well. She has been walking the stores without chest pain or dyspnea. She reports dry cough. She feels it may be related to the Lisinopril. Denies chest pain, dyspnea, orthopnea, pnd, lightheadedness, dizziness, syncope, palpitations, lower extremity edema, or bleeding issues.                Past Medical History:  Past Medical History:   Diagnosis Date    Allergic contact dermatitis due to plants, except food 08/24/2018    Poison ivy    Cerebrovascular disease, unspecified 03/13/2015    Ill-defined cerebrovascular disease    Encounter for follow-up examination after completed treatment for conditions other than malignant neoplasm 11/25/2019    Hospital discharge follow-up    Encounter for screening mammogram for malignant neoplasm of breast 09/21/2016    Encounter for mammogram to establish baseline mammogram    Hyperlipidemia, unspecified 03/13/2015    Dyslipidemia    Low back pain, unspecified 10/09/2019    Acute exacerbation of chronic low back pain    Low back pain, unspecified 10/09/2019    Acute exacerbation of chronic low back pain    Low back pain, unspecified 07/25/2017    Acute exacerbation of chronic low back pain    Other specified postprocedural states     H/O colonoscopy    Personal history of other diseases of the musculoskeletal system and connective tissue 04/05/2018    History of muscle spasm    Personal history of other diseases of urinary system 03/15/2018    History of hematuria    Personal history of other medical treatment     H/O bone density study    Personal history  of other medical treatment     History of mammogram    Personal history of other specified conditions 05/02/2020    History of nasal congestion    Personal history of other specified conditions 11/25/2019    History of bacteremia    Rash and other nonspecific skin eruption 03/28/2016    Skin rash    Strain of muscle, fascia and tendon of lower back, initial encounter 04/06/2016    Lumbar strain    Urinary tract infection, site not specified 04/05/2018    Acute UTI    Vitamin D deficiency, unspecified 02/08/2016    Vitamin D deficiency        Past Surgical History:  Past Surgical History:   Procedure Laterality Date    CT NECK ANGIO W AND WO IV CONTRAST  8/28/2020    CT NECK ANGIO W AND WO IV CONTRAST 8/28/2020 [facility] INPATIENT LEGAC[facility]    HYSTERECTOMY  03/13/2015    Hysterectomy    OTHER SURGICAL HISTORY  01/22/2018    Aortic Valve Repair          Social History:   reports that she has quit smoking. Her smoking use included cigarettes. She has never used smokeless tobacco. She reports that she does not drink alcohol and does not use drugs.     Family History:  family history includes Alzheimer's disease in her mother; Colon cancer in her mother.      Allergies:  Allergies   Allergen Reactions    Heparin Other     Low Platelet Count    Lisinopril Cough    Penicillins Unknown       Outpatient Medications:  Current Outpatient Medications   Medication Instructions    acetaminophen (Tylenol Extra Strength) 500 mg tablet 2 tablets, oral, Every 6 hours PRN    acetaminophen (Tylenol) 325 mg tablet 2 tablets, oral, Every 4 hours PRN    albuterol 90 mcg/actuation inhaler 2 puffs, inhalation, Every 4 hours PRN    amLODIPine (NORVASC) 10 mg, oral, Daily    aspirin (Aspirin Childrens) 81 mg chewable tablet 1 tablet, oral, Daily    atorvastatin (Lipitor) 40 mg tablet Take 1 tablet (40 mg) by mouth once daily.    benzonatate (Tessalon) 100 mg capsule 1 capsule, oral, 3 times daily PRN    benzonatate (TESSALON) 100 mg, oral, 2 times  daily PRN, Do not crush or chew.    budesonide (Pulmicort) 0.5 mg/2 mL nebulizer solution 2 mL, inhalation, Every 12 hours    Ca-D3-mag ox-zinc--keely-bor (Calcium 600-D3 Plus, mag-zinc,) 600 mg calcium- 20 mcg-50 mg tablet 1 tablet, oral, 2 times daily    calcium carb-mag ox-zinc sulf 334-134-5 mg tablet 1 tablet, oral, 2 times daily    chlorthalidone (HYGROTON) 25 mg, oral, Daily    cholecalciferol (Vitamin D-3) 50 mcg (2,000 unit) capsule 1 capsule, oral, Daily    clindamycin (Cleocin) 300 mg capsule 2 capsules, oral, Once, 60 MINUTES PRIOR TO DENTAL CLEANINGS AND PROCEDURES<BR>    diazePAM (Valium) 5 mg tablet 1 tablet, oral, Daily    docusate sodium (Colace) 100 mg capsule 1 capsule, oral, 2 times daily PRN    escitalopram (LEXAPRO) 10 mg, oral, Daily    fluticasone (Flonase) 50 mcg/actuation nasal spray 1 spray, Each Nostril, Daily PRN    fluticasone (Flovent Diskus) 100 mcg/actuation diskus inhaler inhalation, As needed    folic acid (Folvite) 1 mg tablet 1 tablet, oral, Daily    glucos sul 2KCl/msm/chond/C/Mn (GLUCOSAMINE CHONDROITIN ORAL) 1 tablet, oral, Daily, Glucosamine and Chondroit-MV-Min3 550-580-12-0.5 mg ORAL Tab    guaiFENesin (Mucinex) 600 mg 12 hr tablet 1 tablet, oral, Every 12 hours    ipratropium-albuteroL (Duo-Neb) 0.5-2.5 mg/3 mL nebulizer solution 3 mL, inhalation, Every 2 hour PRN    lisinopril 20 mg tablet 1 tablet, oral, Daily    metoprolol tartrate (Lopressor) 25 mg tablet 0.5 tablets, oral, Every 12 hours    multivitamin-min-iron-FA-vit K (Adults Multivitamin) 18 mg iron-400 mcg-25 mcg tablet 1 tablet, oral, Daily    NON FORMULARY 1 each, oral, Daily, SOYBEAN EXTRACT-SOY ISOFLAVONE 325 MG-65 MG TAB    oxybutynin XL (DITROPAN-XL) 5 mg, oral, Daily    oxygen (O2) therapy 3 L, Continuous    pantoprazole (ProtoNix) 40 mg EC tablet 1 tablet, oral, Daily    polyethylene glycol (GLYCOLAX, MIRALAX) 17 g, oral, Daily PRN, May buy over the counter.    potassium chloride CR 10 mEq ER tablet 1  "tablet, oral, 2 times daily    propranolol XL (INNOPRAN XL) 120 mg, oral, Daily    rosuvastatin (Crestor) 40 mg tablet 1 tablet, oral, Nightly    simvastatin (Zocor) 40 mg tablet 1 tablet, oral, Daily    tiZANidine (Zanaflex) 2 mg tablet     warfarin (Coumadin) 10 mg tablet 1 tablet, oral, Daily    warfarin (Coumadin) 2.5 mg tablet 1 tab qd    warfarin (COUMADIN) 5 mg, oral, Nightly, Daily as directed       Physical Exam:  Vitals:    10/11/23 1307   BP: 110/72   BP Location: Left arm   Patient Position: Sitting   Pulse: 71   SpO2: 98%   Weight: 81.2 kg (179 lb)   Height: 1.676 m (5' 6\")     Wt Readings from Last 5 Encounters:   10/11/23 81.2 kg (179 lb)   07/27/23 79.8 kg (176 lb)   04/05/23 78.5 kg (173 lb)   03/29/23 79.8 kg (176 lb)   02/01/23 79.4 kg (175 lb)     Body mass index is 28.89 kg/m².     GENERAL: alert, cooperative, pleasant, in no acute distress  SKIN: warm and dry  NECK: Normal JVD, negative HJR  CARDIAC: Regular rate and rhythm with no rubs, murmurs, or gallops  CHEST: Normal respiratory efforts, lungs clear to auscultation bilaterally.  ABDOMEN: soft, nontender, nondistended  EXTREMITIES: no edema, +2 palpable RP bilaterally       Last Labs:  Recent Labs     09/15/22  1026 12/29/22  1127 04/20/23  1038   WBC 5.7 7.1 7.3   HGB 11.7* 11.9* 13.1   HCT 36.1 37.7 40.0    197 202   MCV 93 93 92     Recent Labs     04/20/23  1038 05/08/23  1016 05/23/23  1022 07/12/23  1010    138 140  --    K 4.7 5.2 5.1  --     104 107  --    BUN 29* 37* 22  --    CREATININE 1.20* 1.48* 0.95 1.12*     CMP -  Lab Results   Component Value Date    CALCIUM 11.0 (H) 07/12/2023    PHOS 2.9 07/12/2023    PROT 7.0 04/20/2023    ALBUMIN 4.8 07/12/2023    AST 19 04/20/2023    ALT 27 04/20/2023    ALKPHOS 74 04/20/2023    BILITOT 0.5 04/20/2023       LIPID PANEL -   Lab Results   Component Value Date    CHOL 166 12/29/2022    HDL 65.7 12/29/2022    LDLF 59 12/29/2022    TRIG 208 (H) 12/29/2022       Lab " Results   Component Value Date     (H) 07/12/2022    HGBA1C 6.3 (A) 12/29/2022       Last Cardiology Tests:  ECG:  Obtained and reviewed EKG- normal sinus rhythm     Echo:  7/13/22  Echo Results:  CONCLUSIONS:   1. The left ventricular systolic function is normal with a 60-65% estimated ejection fraction.   2. Poorly visualized anatomical structures due to suboptimal image quality.   3. Mildly elevated RVSP.   4. There is a stentless aortic valve bioprosthesis.   5. No valvular vegetations seen on this limited study. Consider SYDNI if felt clinically indicated.        Cath:  11/20/2019  CONCLUSIONS:   1. Left main: no significant angiographic disease.   2. LAD: 100% mid-vessel occlusion, faint distal filling from right to left collaterals.   3. LCx: no significant angiographic disease.   4. RCA: no significant angiographic disease.   5. LVEDP 3mmHg.   6. Successful PCI to 100% mid-LAD lesion with a 2.25 x 15mm Resolute MARILEE post-dilated distally with a 2.25mm NC balloon at 24atm and proximally with a 2.5mm NC balloon at 24atm.       Stress Test:  Stress Results:  No results found for this or any previous visit from the past 365 days.             Assessment/Plan   Diagnoses and all orders for this visit:  Paroxysmal A-fib (CMS/HCC)  -     ECG 12 lead (Clinic Performed)  -     CBC; Future  -     Basic metabolic panel; Future  -     pantoprazole (ProtoNix) 40 mg EC tablet; Take 1 tablet (40 mg) by mouth once daily in the morning. Take before meals.  S/P AVR (aortic valve replacement)  Benign essential hypertension  -     CBC; Future  -     Basic metabolic panel; Future  -     Lipid panel; Future  -     TSH; Future  -     losartan (Cozaar) 25 mg tablet; Take 1 tablet (25 mg) by mouth once daily.    In summary Ms. Pena is a pleasant 73 year-old white female with a past medical history significant for severe aortic stenosis with probable bicuspid valve status post AVR with subsequent prosthetic valve endocarditis  and redo AVR with reconstruction of the LVOT with mildly reduced LV function, hypertension, hyperlipidemia, and prior remote cryptogenic stroke on chronic anticoagulation with subsequent likely embolic events, and nonobstructive carotid atherosclerosis, postoperative atrial fibrillation, coronary artery disease status post PCI of her mid LAD in the setting of a possible embolic NSTEMI, and possible HIT. She is asymptomatic from a cardiac perspective. Her blood pressure is controlled. Given her cough I did stop Lisinopril and switch her to Losartan as above. I encouraged her to increase her physical activity. I have ordered blood work as above.  She should continue her current cardiovascular medications. I will see her back in follow-up in 6 months.       Followup Appts:  Future Appointments   Date Time Provider Department Center   [facility]2023 10[id number] [id number] [place]   10/30/2023 11:45 AM Jigar Hurst MD [id number] [place]   2/13/2024 11:00 AM Alonzo Casas MD [id number] [facility]   4/24/2024  4:00 PM Nestor Geronimo MD [id number] [facility]           ____________________________________________________________  Shanique Longo, APRN-CNP  [facility]  [facility]

## 2023-10-18 ENCOUNTER — LAB (OUTPATIENT)
Dept: LAB | Facility: LAB | Age: 73
End: 2023-10-18
Payer: MEDICARE

## 2023-10-18 ENCOUNTER — APPOINTMENT (OUTPATIENT)
Dept: CARDIOLOGY | Facility: CLINIC | Age: 73
End: 2023-10-18
Payer: MEDICARE

## 2023-10-18 ENCOUNTER — ANTICOAGULATION - WARFARIN VISIT (OUTPATIENT)
Dept: CARDIOLOGY | Facility: CLINIC | Age: 73
End: 2023-10-18
Payer: MEDICARE

## 2023-10-18 DIAGNOSIS — I10 BENIGN ESSENTIAL HYPERTENSION: ICD-10-CM

## 2023-10-18 DIAGNOSIS — Z79.01 LONG TERM (CURRENT) USE OF ANTICOAGULANTS: ICD-10-CM

## 2023-10-18 DIAGNOSIS — I48.0 PAROXYSMAL A-FIB (MULTI): ICD-10-CM

## 2023-10-18 DIAGNOSIS — I48.11 LONGSTANDING PERSISTENT ATRIAL FIBRILLATION (MULTI): ICD-10-CM

## 2023-10-18 DIAGNOSIS — Z95.3 H/O HEART VALVE REPLACEMENT WITH BIOPROSTHETIC VALVE: Primary | ICD-10-CM

## 2023-10-18 LAB
ANION GAP SERPL CALC-SCNC: 15 MMOL/L (ref 10–20)
BUN SERPL-MCNC: 20 MG/DL (ref 6–23)
CALCIUM SERPL-MCNC: 11.3 MG/DL (ref 8.6–10.6)
CHLORIDE SERPL-SCNC: 105 MMOL/L (ref 98–107)
CHOLEST SERPL-MCNC: 171 MG/DL (ref 0–199)
CHOLESTEROL/HDL RATIO: 2.5
CO2 SERPL-SCNC: 23 MMOL/L (ref 21–32)
CREAT SERPL-MCNC: 1.02 MG/DL (ref 0.5–1.05)
ERYTHROCYTE [DISTWIDTH] IN BLOOD BY AUTOMATED COUNT: 12.7 % (ref 11.5–14.5)
GFR SERPL CREATININE-BSD FRML MDRD: 58 ML/MIN/1.73M*2
GLUCOSE SERPL-MCNC: 114 MG/DL (ref 74–99)
HCT VFR BLD AUTO: 42.7 % (ref 36–46)
HDLC SERPL-MCNC: 69 MG/DL
HGB BLD-MCNC: 13.6 G/DL (ref 12–16)
INR IN PPP BY COAGULATION ASSAY EXTERNAL: 1.6
LDLC SERPL CALC-MCNC: 68 MG/DL
MCH RBC QN AUTO: 29.8 PG (ref 26–34)
MCHC RBC AUTO-ENTMCNC: 31.9 G/DL (ref 32–36)
MCV RBC AUTO: 93 FL (ref 80–100)
NON HDL CHOLESTEROL: 102 MG/DL (ref 0–149)
NRBC BLD-RTO: 0 /100 WBCS (ref 0–0)
PLATELET # BLD AUTO: 223 X10*3/UL (ref 150–450)
PMV BLD AUTO: 10.1 FL (ref 7.5–11.5)
POTASSIUM SERPL-SCNC: 4.2 MMOL/L (ref 3.5–5.3)
PROTHROMBIN TIME (PT) IN PPP BY COAGULATION ASSAY EXTERNAL: NORMAL SECONDS
RBC # BLD AUTO: 4.57 X10*6/UL (ref 4–5.2)
SODIUM SERPL-SCNC: 139 MMOL/L (ref 136–145)
TRIGL SERPL-MCNC: 172 MG/DL (ref 0–149)
TSH SERPL-ACNC: 2.42 MIU/L (ref 0.44–3.98)
VLDL: 34 MG/DL (ref 0–40)
WBC # BLD AUTO: 8.9 X10*3/UL (ref 4.4–11.3)

## 2023-10-18 PROCEDURE — 99211 OFF/OP EST MAY X REQ PHY/QHP: CPT | Performed by: INTERNAL MEDICINE

## 2023-10-18 PROCEDURE — 80048 BASIC METABOLIC PNL TOTAL CA: CPT

## 2023-10-18 PROCEDURE — 80061 LIPID PANEL: CPT

## 2023-10-18 PROCEDURE — 84443 ASSAY THYROID STIM HORMONE: CPT

## 2023-10-18 PROCEDURE — 36415 COLL VENOUS BLD VENIPUNCTURE: CPT

## 2023-10-18 PROCEDURE — 85027 COMPLETE CBC AUTOMATED: CPT

## 2023-10-18 NOTE — PROGRESS NOTES
Patient identification verified with 2 identifiers.    Location: [facility] Patient Self-Testing Program [phone]    Referring Physician: Silas Lawson MD  Enrollment/ Re-enrollment date: 24   INR Goal: 2.0-3.0  INR monitoring is per [facility] protocol.  Anticoagulation Medication: warfarin  Indication: aortic valve regurgitation    Subjective   Bleeding signs/symptoms: No    Bruising: No   Major bleeding event: No  Thrombosis signs/symptoms: No  Thromboembolic event: No  Missed doses: No  Extra doses: No  Medication changes: No  Dietary changes: No  Change in health: No  Change in activity: No  Alcohol: No  Other concerns: No    Upcoming Surgeries:  Does the Patient Have any upcoming surgeries that require interruption in anticoagulation therapy? no  Does the patient require bridging? no      Anticoagulation Summary  As of 10/18/2023      INR goal:  2.0-3.0   TTR:  40.0 % (2.9 wk)   INR used for dosin.60 (10/18/2023)   Weekly warfarin total:  50 mg               Assessment/Plan   Subtherapeutic     1. New dose:  2.5mg extra today. Maintain weekly dose     2. Next INR: 1 week      Education provided to patient during the visit:  Patient instructed to call in interim with questions, concerns and changes.

## 2023-10-25 ENCOUNTER — ANTICOAGULATION - WARFARIN VISIT (OUTPATIENT)
Dept: CARDIOLOGY | Facility: CLINIC | Age: 73
End: 2023-10-25
Payer: MEDICARE

## 2023-10-25 DIAGNOSIS — I48.11 LONGSTANDING PERSISTENT ATRIAL FIBRILLATION (MULTI): ICD-10-CM

## 2023-10-25 DIAGNOSIS — Z79.01 LONG TERM (CURRENT) USE OF ANTICOAGULANTS: ICD-10-CM

## 2023-10-25 DIAGNOSIS — Z95.3 H/O HEART VALVE REPLACEMENT WITH BIOPROSTHETIC VALVE: ICD-10-CM

## 2023-10-25 LAB
INR IN PPP BY COAGULATION ASSAY EXTERNAL: 2 (ref 2–3)
PROTHROMBIN TIME (PT) IN PPP BY COAGULATION ASSAY EXTERNAL: NORMAL SECONDS

## 2023-10-25 NOTE — PROGRESS NOTES
Patient identification verified with 2 identifiers.    Location: [facility] Patient Self-Testing Program [phone]    Referring Physician: Silas Lawson MD  Enrollment/ Re-enrollment date: 24   INR Goal: 2.0-3.0  INR monitoring is per [facility] protocol.  Anticoagulation Medication: warfarin  Indication: aortic valve regurgitation    Subjective   Bleeding signs/symptoms: No    Bruising: No   Major bleeding event: No  Thrombosis signs/symptoms: No  Thromboembolic event: No  Missed doses: No  Extra doses: No  Medication changes: No  Dietary changes: No  Change in health: No  Change in activity: No  Alcohol: No  Other concerns: No    Upcoming Surgeries:  Does the Patient Have any upcoming surgeries that require interruption in anticoagulation therapy? no  Does the patient require bridging? no      Anticoagulation Summary  As of 10/25/2023      INR goal:  2.0-3.0   TTR:  29.6 % (3.9 wk)   INR used for dosin.00 (10/25/2023)   Weekly warfarin total:  50 mg               Assessment/Plan   Therapeutic Spoke with pt and confirmed dosing schedule.  No questions or concerns.    1. New dose: no change    2. Next INR: 1 week      Education provided to patient during the visit:  Patient instructed to call in interim with questions, concerns and changes.

## 2023-10-30 ENCOUNTER — OFFICE VISIT (OUTPATIENT)
Dept: PRIMARY CARE | Facility: CLINIC | Age: 73
End: 2023-10-30
Payer: MEDICARE

## 2023-10-30 VITALS
WEIGHT: 181 LBS | SYSTOLIC BLOOD PRESSURE: 132 MMHG | DIASTOLIC BLOOD PRESSURE: 70 MMHG | HEIGHT: 66 IN | BODY MASS INDEX: 29.09 KG/M2

## 2023-10-30 DIAGNOSIS — I10 PRIMARY HYPERTENSION: ICD-10-CM

## 2023-10-30 DIAGNOSIS — I25.10 CORONARY ARTERY DISEASE INVOLVING NATIVE HEART WITHOUT ANGINA PECTORIS, UNSPECIFIED VESSEL OR LESION TYPE: ICD-10-CM

## 2023-10-30 DIAGNOSIS — I48.0 PAROXYSMAL A-FIB (MULTI): ICD-10-CM

## 2023-10-30 DIAGNOSIS — E21.3 HYPERPARATHYROIDISM (MULTI): ICD-10-CM

## 2023-10-30 DIAGNOSIS — E11.8 CONTROLLED TYPE 2 DIABETES MELLITUS WITH COMPLICATION, WITHOUT LONG-TERM CURRENT USE OF INSULIN (MULTI): Primary | ICD-10-CM

## 2023-10-30 PROCEDURE — 1126F AMNT PAIN NOTED NONE PRSNT: CPT | Performed by: STUDENT IN AN ORGANIZED HEALTH CARE EDUCATION/TRAINING PROGRAM

## 2023-10-30 PROCEDURE — 1159F MED LIST DOCD IN RCRD: CPT | Performed by: STUDENT IN AN ORGANIZED HEALTH CARE EDUCATION/TRAINING PROGRAM

## 2023-10-30 PROCEDURE — 4010F ACE/ARB THERAPY RXD/TAKEN: CPT | Performed by: STUDENT IN AN ORGANIZED HEALTH CARE EDUCATION/TRAINING PROGRAM

## 2023-10-30 PROCEDURE — 1036F TOBACCO NON-USER: CPT | Performed by: STUDENT IN AN ORGANIZED HEALTH CARE EDUCATION/TRAINING PROGRAM

## 2023-10-30 PROCEDURE — 1160F RVW MEDS BY RX/DR IN RCRD: CPT | Performed by: STUDENT IN AN ORGANIZED HEALTH CARE EDUCATION/TRAINING PROGRAM

## 2023-10-30 PROCEDURE — 3078F DIAST BP <80 MM HG: CPT | Performed by: STUDENT IN AN ORGANIZED HEALTH CARE EDUCATION/TRAINING PROGRAM

## 2023-10-30 PROCEDURE — 3048F LDL-C <100 MG/DL: CPT | Performed by: STUDENT IN AN ORGANIZED HEALTH CARE EDUCATION/TRAINING PROGRAM

## 2023-10-30 PROCEDURE — 99213 OFFICE O/P EST LOW 20 MIN: CPT | Performed by: STUDENT IN AN ORGANIZED HEALTH CARE EDUCATION/TRAINING PROGRAM

## 2023-10-30 PROCEDURE — 3075F SYST BP GE 130 - 139MM HG: CPT | Performed by: STUDENT IN AN ORGANIZED HEALTH CARE EDUCATION/TRAINING PROGRAM

## 2023-10-30 NOTE — PROGRESS NOTES
Subjective   Patient ID: Mario Alberto Pena is a 73 y.o. female who presents for Follow-up.    HPI   Routine fu.    She is feeling well in general.    Cardiology recently stopped lisinopril and started losartan.     has been sick, in and out of hospital. She is helping to care for him at home.  Review of Systems  12-point ROS reviewed and was negative unless otherwise noted in HPI.    Objective   There were no vitals taken for this visit.    Physical Exam  GEN: conversant, NAD  HEENT: PERRL, EOMI, MMM  NECK: supple  CV: S1, S2, regular rate  PULM: CTAB  ABD: soft, NT, ND  NEURO: no new gross focal deficits  EXT: no sig LE edema  PSYCH: appropriate affect    Assessment/Plan     #Hypertension: continue amlodipine 10mg daily, losartan     #hypercalcemia/primary hyperparathyroidism: seeing endocrinology, endocrine surgery evaluated the patient, no indication for surgical intervention at this time, per their report. Advised to continue off Calcium/vitamin D supplementation and follow up with Endocrinology.     #Prosthetic valve endocarditis:   #paroxysmal AFib  -s/p surgical repeat AVR (2020). Follows with Cardiology.   -Continue warfarin. Interval specialist notes reviewed. Checking her INR at home  -gets prophylaxis for dental procedures      #CAD s/p NSTEMI: stent placed to LAD 2019. Follows with Cardiology     #DM2: Discussed lifestyle modifications. Continue diet and exercise and weight loss. Seeing endocrinology.     #Anxiety: Mood stable. Continue escitalopram     #h/o Stroke: Continue statin. Continue warfarin. Follows with Neurology.      #HLD: Currently on atorvastatin. Continue fish oil.     #Health maintenance:   - Mammogram done 3/2023  - Colonoscopy and EGD 11/2019, repeat recommended for 2024.   - Advised Shingrix.   - Advised yearly flu shot  - Advised GYN eval.   - Seeing ophthalmology and dentist annually   - UTD with PCV13 (2016) and PPSV23 (2015), advised to get Prevnar 20.  - Received COVID  vaccines, booster  - TDaP given in 2023   - recent labs reviewed     RTC 3-4 mo

## 2023-11-01 ENCOUNTER — ANTICOAGULATION - WARFARIN VISIT (OUTPATIENT)
Dept: CARDIOLOGY | Facility: CLINIC | Age: 73
End: 2023-11-01
Payer: MEDICARE

## 2023-11-01 DIAGNOSIS — Z79.01 LONG TERM (CURRENT) USE OF ANTICOAGULANTS: ICD-10-CM

## 2023-11-01 DIAGNOSIS — Z95.3 H/O HEART VALVE REPLACEMENT WITH BIOPROSTHETIC VALVE: Primary | ICD-10-CM

## 2023-11-01 DIAGNOSIS — I48.11 LONGSTANDING PERSISTENT ATRIAL FIBRILLATION (MULTI): ICD-10-CM

## 2023-11-01 LAB
INR IN PPP BY COAGULATION ASSAY EXTERNAL: 3.1
PROTHROMBIN TIME (PT) IN PPP BY COAGULATION ASSAY EXTERNAL: NORMAL SECONDS

## 2023-11-01 NOTE — PROGRESS NOTES
Patient identification verified with 2 identifiers.    Location: [facility] Patient Self-Testing Program [phone]    Referring Physician: Dr Lawson  Enrollment/ Re-enrollment date: 9/9/2024   INR Goal: 2.0-3.0  INR monitoring is per [facility] protocol.  Anticoagulation Medication: warfarin  Indication: Atrial Fibrillation/Atrial Flutter and , bioprosthetic valve    Subjective   Bleeding signs/symptoms: No    Bruising: No   Major bleeding event: No  Thrombosis signs/symptoms: No  Thromboembolic event: No  Missed doses: No  Extra doses: No  Medication changes: No  Dietary changes: Yes  Pt did not eat her usual amount of broccolli and cauliflower this past week. She will go back to her usual amount this week.  Change in health: No  Change in activity: No  Alcohol: No  Other concerns: No    Upcoming Surgeries:  Does the Patient Have any upcoming surgeries that require interruption in anticoagulation therapy? no  Does the patient require bridging? no      Anticoagulation Summary  As of 11/1/2023      INR goal:  2.0-3.0   TTR:  42.2 % (1.1 mo)   INR used for dosing:  3.10 (11/1/2023)   Weekly warfarin total:  50 mg               Assessment/Plan   Supratherapeutic     1. New dose: no change    2. Next INR: 1 week      Education provided to patient during the visit:  Patient instructed to call in interim with questions, concerns and changes.   Patient educated on interactions between medications and warfarin.   Patient educated on dietary consistency in vitamin k consumption.   Patient educated on signs of bleeding/clotting.   Patient educated on compliance with dosing, follow up appointments, and prescribed plan of care.

## 2023-11-06 DIAGNOSIS — E78.5 HYPERLIPIDEMIA, UNSPECIFIED HYPERLIPIDEMIA TYPE: Primary | ICD-10-CM

## 2023-11-06 DIAGNOSIS — I10 BENIGN ESSENTIAL HYPERTENSION: ICD-10-CM

## 2023-11-06 RX ORDER — ROSUVASTATIN CALCIUM 40 MG/1
40 TABLET, COATED ORAL NIGHTLY
Qty: 90 TABLET | Refills: 0 | Status: SHIPPED | OUTPATIENT
Start: 2023-11-06 | End: 2024-02-06

## 2023-11-07 RX ORDER — LISINOPRIL 20 MG/1
20 TABLET ORAL DAILY
Qty: 90 TABLET | Refills: 0 | Status: SHIPPED | OUTPATIENT
Start: 2023-11-07

## 2023-11-08 ENCOUNTER — ANTICOAGULATION - WARFARIN VISIT (OUTPATIENT)
Dept: CARDIOLOGY | Facility: CLINIC | Age: 73
End: 2023-11-08
Payer: MEDICARE

## 2023-11-08 DIAGNOSIS — I48.11 LONGSTANDING PERSISTENT ATRIAL FIBRILLATION (MULTI): ICD-10-CM

## 2023-11-08 DIAGNOSIS — Z79.01 LONG TERM (CURRENT) USE OF ANTICOAGULANTS: ICD-10-CM

## 2023-11-08 DIAGNOSIS — Z95.3 H/O HEART VALVE REPLACEMENT WITH BIOPROSTHETIC VALVE: ICD-10-CM

## 2023-11-08 LAB
INR IN PPP BY COAGULATION ASSAY EXTERNAL: 2.4
PROTHROMBIN TIME (PT) IN PPP BY COAGULATION ASSAY EXTERNAL: NORMAL SECONDS

## 2023-11-08 NOTE — PROGRESS NOTES
Patient identification verified with 2 identifiers.    Location: [facility] Patient Self-Testing Program [phone]    Referring Physician: CRISTOBAL  Enrollment/ Re-enrollment date: 24   INR Goal: 2.0-3.0  INR monitoring is per [facility] protocol.  Anticoagulation Medication: warfarin  Indication: Atrial Fibrillation/Atrial Flutter    Subjective   Bleeding signs/symptoms: No    Bruising: No   Major bleeding event: No  Thrombosis signs/symptoms: No  Thromboembolic event: No  Missed doses: No  Extra doses: No  Medication changes: No  Dietary changes: No  Change in health: No  Change in activity: No  Alcohol: No  Other concerns: No    Upcoming Surgeries:  Does the Patient Have any upcoming surgeries that require interruption in anticoagulation therapy? no  Does the patient require bridging? no      Anticoagulation Summary  As of 2023      INR goal:  2.0-3.0   TTR:  49.7 % (1.4 mo)   INR used for dosin.40 (2023)   Weekly warfarin total:  50 mg               Assessment/Plan   Therapeutic     1. New dose: no change    2. Next INR: 2 weeks      Education provided to patient during the visit:  Patient instructed to call in interim with questions, concerns and changes.

## 2023-11-17 ENCOUNTER — TELEPHONE (OUTPATIENT)
Dept: CARDIOLOGY | Facility: HOSPITAL | Age: 73
End: 2023-11-17
Payer: MEDICARE

## 2023-11-17 DIAGNOSIS — I25.10 CORONARY ARTERY DISEASE INVOLVING NATIVE HEART WITHOUT ANGINA PECTORIS, UNSPECIFIED VESSEL OR LESION TYPE: Primary | ICD-10-CM

## 2023-11-17 NOTE — TELEPHONE ENCOUNTER
Per NP Donta,  Lipid panel not at goal. Would recommend adding Ezetimibe 10 mg daily. Repeat lipid panel in 3 months. Calcium chronically elevated. Renal function and electrolytes ok. CBC ok     Patient notified and agreeable to plan of care.

## 2023-11-21 RX ORDER — EZETIMIBE 10 MG/1
10 TABLET ORAL DAILY
Qty: 30 TABLET | Refills: 11 | Status: SHIPPED | OUTPATIENT
Start: 2023-11-21 | End: 2024-11-20

## 2023-11-22 ENCOUNTER — ANTICOAGULATION - WARFARIN VISIT (OUTPATIENT)
Dept: CARDIOLOGY | Facility: CLINIC | Age: 73
End: 2023-11-22
Payer: MEDICARE

## 2023-11-22 DIAGNOSIS — Z95.3 H/O HEART VALVE REPLACEMENT WITH BIOPROSTHETIC VALVE: Primary | ICD-10-CM

## 2023-11-22 DIAGNOSIS — I48.11 LONGSTANDING PERSISTENT ATRIAL FIBRILLATION (MULTI): ICD-10-CM

## 2023-11-22 DIAGNOSIS — Z79.01 LONG TERM (CURRENT) USE OF ANTICOAGULANTS: ICD-10-CM

## 2023-11-22 LAB
INR IN PPP BY COAGULATION ASSAY EXTERNAL: 2.2
PROTHROMBIN TIME (PT) IN PPP BY COAGULATION ASSAY EXTERNAL: NORMAL SECONDS

## 2023-11-22 NOTE — PROGRESS NOTES
Patient identification verified with 2 identifiers.    Location: [facility] Patient Self-Testing Program [phone]    Referring Physician: Dr. Silas Lawson   Enrollment/ Re-enrollment date: 24   INR Goal: 2.0-3.0  INR monitoring is per [facility] protocol.  Anticoagulation Medication: warfarin  Indication: Atrial Fibrillation/Atrial Flutter      Subjective   Bleeding signs/symptoms: No    Bruising: No   Major bleeding event: No  Thrombosis signs/symptoms: No  Thromboembolic event: No  Missed doses: No  Extra doses: No  Medication changes: No  Dietary changes: No  Change in health: No  Change in activity: No  Alcohol: No  Other concerns: No    Upcoming Surgeries:  Does the Patient Have any upcoming surgeries that require interruption in anticoagulation therapy? no  Does the patient require bridging? no      Anticoagulation Summary  As of 2023      INR goal:  2.0-3.0   TTR:  62.7 % (1.8 mo)   INR used for dosin.20 (2023)   Weekly warfarin total:  50 mg               Assessment/Plan   Therapeutic     1. New dose: no change    2. Next INR: 2 weeks      Education provided to patient during the visit:  Patient instructed to call in interim with questions, concerns and changes.   Patient educated on compliance with dosing, follow up appointments, and prescribed plan of care.

## 2023-12-06 ENCOUNTER — ANTICOAGULATION - WARFARIN VISIT (OUTPATIENT)
Dept: CARDIOLOGY | Facility: CLINIC | Age: 73
End: 2023-12-06
Payer: MEDICARE

## 2023-12-06 DIAGNOSIS — I48.11 LONGSTANDING PERSISTENT ATRIAL FIBRILLATION (MULTI): ICD-10-CM

## 2023-12-06 DIAGNOSIS — Z95.3 H/O HEART VALVE REPLACEMENT WITH BIOPROSTHETIC VALVE: Primary | ICD-10-CM

## 2023-12-06 DIAGNOSIS — Z79.01 LONG TERM (CURRENT) USE OF ANTICOAGULANTS: ICD-10-CM

## 2023-12-06 LAB
INR IN PPP BY COAGULATION ASSAY EXTERNAL: 2.7 (ref 2–3)
PROTHROMBIN TIME (PT) IN PPP BY COAGULATION ASSAY EXTERNAL: NORMAL SECONDS

## 2023-12-06 NOTE — PROGRESS NOTES
Patient identification verified with 2 identifiers.    Location: [facility] Patient Self-Testing Program [phone]    Referring Physician: Dr. Silas Lawson   Enrollment/ Re-enrollment date: 24   INR Goal: 2.0-3.0  INR monitoring is per [facility] protocol.  Anticoagulation Medication: warfarin  Indication: Atrial Fibrillation/Atrial Flutter      Subjective   Bleeding signs/symptoms: No    Bruising: No   Major bleeding event: No  Thrombosis signs/symptoms: No  Thromboembolic event: No  Missed doses: No  Extra doses: No  Medication changes: No  Dietary changes: No  Change in health: No  Change in activity: No  Alcohol: No  Other concerns: No    Upcoming Surgeries:  Does the Patient Have any upcoming surgeries that require interruption in anticoagulation therapy? no  Does the patient require bridging? no      Anticoagulation Summary  As of 2023      INR goal:  2.0-3.0   TTR:  70.1 % (2.3 mo)   INR used for dosin.70 (2023)   Weekly warfarin total:  50 mg               Assessment/Plan   Therapeutic     1. New dose: no change  Confirmed current dosing schedule with pt.  2. Next INR: 2 weeks      Education provided to patient during the visit:  Patient instructed to call in interim with questions, concerns and changes.   Patient educated on compliance with dosing, follow up appointments, and prescribed plan of care.

## 2023-12-20 ENCOUNTER — ANTICOAGULATION - WARFARIN VISIT (OUTPATIENT)
Dept: CARDIOLOGY | Facility: CLINIC | Age: 73
End: 2023-12-20
Payer: MEDICARE

## 2023-12-20 DIAGNOSIS — I48.11 LONGSTANDING PERSISTENT ATRIAL FIBRILLATION (MULTI): ICD-10-CM

## 2023-12-20 DIAGNOSIS — Z79.01 LONG TERM (CURRENT) USE OF ANTICOAGULANTS: ICD-10-CM

## 2023-12-20 DIAGNOSIS — Z95.3 H/O HEART VALVE REPLACEMENT WITH BIOPROSTHETIC VALVE: Primary | ICD-10-CM

## 2023-12-20 LAB
INR IN PPP BY COAGULATION ASSAY EXTERNAL: 2.8 (ref 2–3)
PROTHROMBIN TIME (PT) IN PPP BY COAGULATION ASSAY EXTERNAL: NORMAL SECONDS

## 2023-12-20 NOTE — PROGRESS NOTES
Patient identification verified with 2 identifiers.    Location: [facility] Patient Self-Testing Program [phone]    Referring Physician: Dr. Silas Lawson   Enrollment/ Re-enrollment date: 24   INR Goal: 2.0-3.0  INR monitoring is per [facility] protocol.  Anticoagulation Medication: warfarin  Indication: Atrial Fibrillation/Atrial Flutter      Subjective   Bleeding signs/symptoms: No    Bruising: No   Major bleeding event: No  Thrombosis signs/symptoms: No  Thromboembolic event: No  Missed doses: No  Extra doses: No  Medication changes: No  Dietary changes: No  Change in health: No  Change in activity: No  Alcohol: No  Other concerns: No    Upcoming Surgeries:  Does the Patient Have any upcoming surgeries that require interruption in anticoagulation therapy? no  Does the patient require bridging? no      Anticoagulation Summary  As of 2023      INR goal:  2.0-3.0   TTR:  75.1 % (2.8 mo)   INR used for dosin.80 (2023)   Weekly warfarin total:  50 mg               Assessment/Plan   Therapeutic     1. New dose: no change  Confirmed current dosing schedule with pt.  2. Next INR: 2 weeks      Education provided to patient during the visit:  Patient instructed to call in interim with questions, concerns and changes.   Patient educated on compliance with dosing, follow up appointments, and prescribed plan of care.

## 2024-01-03 ENCOUNTER — ANTICOAGULATION - WARFARIN VISIT (OUTPATIENT)
Dept: CARDIOLOGY | Facility: CLINIC | Age: 74
End: 2024-01-03
Payer: MEDICARE

## 2024-01-03 DIAGNOSIS — I48.11 LONGSTANDING PERSISTENT ATRIAL FIBRILLATION (MULTI): ICD-10-CM

## 2024-01-03 DIAGNOSIS — Z95.3 H/O HEART VALVE REPLACEMENT WITH BIOPROSTHETIC VALVE: ICD-10-CM

## 2024-01-03 DIAGNOSIS — Z79.01 LONG TERM (CURRENT) USE OF ANTICOAGULANTS: ICD-10-CM

## 2024-01-03 LAB
INR IN PPP BY COAGULATION ASSAY EXTERNAL: 2.6
PROTHROMBIN TIME (PT) IN PPP BY COAGULATION ASSAY EXTERNAL: NORMAL SECONDS

## 2024-01-03 NOTE — PROGRESS NOTES
Patient identification verified with 2 identifiers.    Location: [facility] Patient Self-Testing Program [phone]    Referring Physician: DR. JAIN  Enrollment/ Re-enrollment date: 2024   INR Goal: 2.0-3.0  INR monitoring is per [facility] protocol.  Anticoagulation Medication: warfarin  Indication: Atrial Fibrillation/Atrial Flutter    Subjective   Bleeding signs/symptoms: No    Bruising: No   Major bleeding event: No  Thrombosis signs/symptoms: No  Thromboembolic event: No  Missed doses: No  Extra doses: No  Medication changes: No  Dietary changes: No  Change in health: No  Change in activity: No  Alcohol: No  Other concerns: No    Upcoming Surgeries:  Does the Patient Have any upcoming surgeries that require interruption in anticoagulation therapy? no  Does the patient require bridging? no      Anticoagulation Summary  As of 1/3/2024      INR goal:  2.0-3.0   TTR:  78.7 % (3.2 mo)   INR used for dosin.60 (1/3/2024)   Weekly warfarin total:  50 mg               Assessment/Plan   Therapeutic   CALLED AND SPOKE TO PT. DOSING CONFIRMED. PT WILL CALL [phone] IF ANY CHANGES IN MEDICATIONS, DIET, HEALTH.  1. New dose: no change    2. Next INR: 2 weeks      Education provided to patient during the visit:  Patient instructed to call in interim with questions, concerns and changes.   Patient educated on interactions between medications and warfarin.   Patient educated on dietary consistency in vitamin k consumption.   Patient educated on affects of alcohol consumption while taking warfarin.   Patient educated on signs of bleeding/clotting.   Patient educated on compliance with dosing, follow up appointments, and prescribed plan of care.

## 2024-01-17 ENCOUNTER — ANTICOAGULATION - WARFARIN VISIT (OUTPATIENT)
Dept: CARDIOLOGY | Facility: HOSPITAL | Age: 74
End: 2024-01-17
Payer: MEDICARE

## 2024-01-17 DIAGNOSIS — Z79.01 LONG TERM (CURRENT) USE OF ANTICOAGULANTS: ICD-10-CM

## 2024-01-17 DIAGNOSIS — I48.11 LONGSTANDING PERSISTENT ATRIAL FIBRILLATION (MULTI): ICD-10-CM

## 2024-01-17 DIAGNOSIS — Z95.3 H/O HEART VALVE REPLACEMENT WITH BIOPROSTHETIC VALVE: ICD-10-CM

## 2024-01-17 LAB
POC INR: 3
POC PROTHROMBIN TIME: NORMAL

## 2024-01-17 PROCEDURE — 85610 PROTHROMBIN TIME: CPT | Mod: QW

## 2024-01-17 NOTE — PROGRESS NOTES
Patient identification verified with 2 identifiers.    Location: [facility] Patient Self-Testing Program [phone]    Referring Physician: DR. JAIN  Enrollment/ Re-enrollment date: 9/9/2024   INR Goal: 2.0-3.0  INR monitoring is per [facility] protocol.  Anticoagulation Medication: warfarin  Indication: Atrial Fibrillation/Atrial Flutter    Subjective   Bleeding signs/symptoms: No    Bruising: No   Major bleeding event: No  Thrombosis signs/symptoms: No  Thromboembolic event: No  Missed doses: No  Extra doses: No  Medication changes: No  Dietary changes: No  Change in health: No  Change in activity: No  Alcohol: No  Other concerns: No    Upcoming Surgeries:  Does the Patient Have any upcoming surgeries that require interruption in anticoagulation therapy? no  Does the patient require bridging? no      Anticoagulation Summary  As of 1/17/2024      INR goal:  2.0-3.0   TTR:  81.5 % (3.7 mo)   INR used for dosing:  3.00 (1/17/2024)   Weekly warfarin total:  50 mg               Assessment/Plan   Therapeutic   CALLED AND SPOKE TO PT. DOSING CONFIRMED. PT WILL CALL [phone] IF ANY CHANGES IN MEDICATIONS, DIET, HEALTH.  1. New dose: no change    2. Next INR: 2 weeks      Education provided to patient during the visit:  Patient instructed to call in interim with questions, concerns and changes.   Patient educated on interactions between medications and warfarin.   Patient educated on dietary consistency in vitamin k consumption.   Patient educated on affects of alcohol consumption while taking warfarin.   Patient educated on signs of bleeding/clotting.   Patient educated on compliance with dosing, follow up appointments, and prescribed plan of care.

## 2024-01-31 ENCOUNTER — ANTICOAGULATION - WARFARIN VISIT (OUTPATIENT)
Dept: ANTICOAGULATION | Facility: HOSPITAL | Age: 74
End: 2024-01-31
Payer: MEDICARE

## 2024-01-31 DIAGNOSIS — Z95.3 H/O HEART VALVE REPLACEMENT WITH BIOPROSTHETIC VALVE: ICD-10-CM

## 2024-01-31 DIAGNOSIS — I48.11 LONGSTANDING PERSISTENT ATRIAL FIBRILLATION (MULTI): ICD-10-CM

## 2024-01-31 DIAGNOSIS — Z79.01 LONG TERM (CURRENT) USE OF ANTICOAGULANTS: ICD-10-CM

## 2024-01-31 LAB
INR IN PPP BY COAGULATION ASSAY EXTERNAL: 3.2
PROTHROMBIN TIME (PT) IN PPP BY COAGULATION ASSAY EXTERNAL: NORMAL SECONDS

## 2024-02-06 DIAGNOSIS — E78.5 HYPERLIPIDEMIA, UNSPECIFIED HYPERLIPIDEMIA TYPE: Primary | ICD-10-CM

## 2024-02-06 RX ORDER — ROSUVASTATIN CALCIUM 40 MG/1
40 TABLET, COATED ORAL NIGHTLY
Qty: 90 TABLET | Refills: 3 | Status: SHIPPED | OUTPATIENT
Start: 2024-02-06

## 2024-02-07 ENCOUNTER — ANTICOAGULATION - WARFARIN VISIT (OUTPATIENT)
Dept: CARDIOLOGY | Facility: CLINIC | Age: 74
End: 2024-02-07
Payer: MEDICARE

## 2024-02-07 DIAGNOSIS — I48.11 LONGSTANDING PERSISTENT ATRIAL FIBRILLATION (MULTI): ICD-10-CM

## 2024-02-07 DIAGNOSIS — Z95.3 H/O HEART VALVE REPLACEMENT WITH BIOPROSTHETIC VALVE: Primary | ICD-10-CM

## 2024-02-07 DIAGNOSIS — Z79.01 LONG TERM (CURRENT) USE OF ANTICOAGULANTS: ICD-10-CM

## 2024-02-07 NOTE — PROGRESS NOTES
Patient identification verified with 2 identifiers.    Location: [facility] Patient Self-Testing Program [phone]    Referring Physician: DR. JAIN  Enrollment/ Re-enrollment date: 2024   INR Goal: 2.0-3.0  INR monitoring is per [facility] protocol.  Anticoagulation Medication: warfarin  Indication: Atrial Fibrillation/Atrial Flutter    Subjective   Bleeding signs/symptoms: No    Bruising: No   Major bleeding event: No  Thrombosis signs/symptoms: No  Thromboembolic event: No  Missed doses: No  Extra doses: No  Medication changes: No  Dietary changes: No  Change in health: No  Change in activity: No  Alcohol: No  Other concerns: No    Upcoming Surgeries:  Does the Patient Have any upcoming surgeries that require interruption in anticoagulation therapy? no  Does the patient require bridging? no      Anticoagulation Summary  As of 2024      INR goal:  2.0-3.0   TTR:  71.7 % (4.4 mo)   INR used for dosin.70 (2024)   Weekly warfarin total:  50 mg               Assessment/Plan   Therapeutic     1. New dose: no change  Spoke with pt and confirmed current dosing schedule.  2. Next INR: 1 week      Education provided to patient during the visit:  Patient instructed to call in interim with questions, concerns and changes.   Patient educated on interactions between medications and warfarin.   Patient educated on dietary consistency in vitamin k consumption.   Patient educated on affects of alcohol consumption while taking warfarin.   Patient educated on signs of bleeding/clotting.   Patient educated on compliance with dosing, follow up appointments, and prescribed plan of care.

## 2024-02-12 ENCOUNTER — DOCUMENTATION (OUTPATIENT)
Dept: ENDOCRINOLOGY | Facility: CLINIC | Age: 74
End: 2024-02-12
Payer: MEDICARE

## 2024-02-12 NOTE — PROGRESS NOTES
Patient unable to make appointment with me for her hypercalcemia.  Will transfer her care to Dr. Maia Montero, and try for her to book with Genetics as previously discussed.  Patient has been busy taking care of her sickly .  Alonzo Casas MD

## 2024-02-14 ENCOUNTER — ANTICOAGULATION - WARFARIN VISIT (OUTPATIENT)
Dept: CARDIOLOGY | Facility: CLINIC | Age: 74
End: 2024-02-14
Payer: MEDICARE

## 2024-02-14 DIAGNOSIS — I48.11 LONGSTANDING PERSISTENT ATRIAL FIBRILLATION (MULTI): ICD-10-CM

## 2024-02-14 DIAGNOSIS — Z95.3 H/O HEART VALVE REPLACEMENT WITH BIOPROSTHETIC VALVE: Primary | ICD-10-CM

## 2024-02-14 DIAGNOSIS — Z79.01 LONG TERM (CURRENT) USE OF ANTICOAGULANTS: ICD-10-CM

## 2024-02-14 LAB
INR IN PPP BY COAGULATION ASSAY EXTERNAL: 2.7
PROTHROMBIN TIME (PT) IN PPP BY COAGULATION ASSAY EXTERNAL: NORMAL SECONDS

## 2024-02-14 NOTE — PROGRESS NOTES
Patient identification verified with 2 identifiers.    Location: [facility] Patient Self-Testing Program [phone]    Referring Physician: DR. JAIN  Enrollment/ Re-enrollment date: 24   INR Goal: 2.0-3.0  INR monitoring is per [facility] protocol.  Anticoagulation Medication: warfarin  Indication: Atrial Fibrillation/Atrial Flutter    Subjective   Bleeding signs/symptoms: No    Bruising: No   Major bleeding event: No  Thrombosis signs/symptoms: No  Thromboembolic event: No  Missed doses: No  Extra doses: No  Medication changes: No  Dietary changes: No  Change in health: No  Change in activity: No  Alcohol: No  Other concerns: No    Upcoming Procedures:  Does the Patient Have any upcoming procedures that require interruption in anticoagulation therapy? no  Does the patient require bridging? no      Anticoagulation Summary  As of 2024      INR goal:  2.0-3.0   TTR:  73.2 % (4.6 mo)   INR used for dosin.70 (2024)   Weekly warfarin total:  50 mg               Assessment/Plan   Therapeutic     1. New dose: no change    2. Next INR: 2 weeks      Education provided to patient during the visit:  Patient instructed to call in interim with questions, concerns and changes.

## 2024-02-28 ENCOUNTER — ANTICOAGULATION - WARFARIN VISIT (OUTPATIENT)
Dept: CARDIOLOGY | Facility: CLINIC | Age: 74
End: 2024-02-28
Payer: MEDICARE

## 2024-02-28 DIAGNOSIS — I48.11 LONGSTANDING PERSISTENT ATRIAL FIBRILLATION (MULTI): ICD-10-CM

## 2024-02-28 DIAGNOSIS — Z95.3 H/O HEART VALVE REPLACEMENT WITH BIOPROSTHETIC VALVE: Primary | ICD-10-CM

## 2024-02-28 DIAGNOSIS — Z79.01 LONG TERM (CURRENT) USE OF ANTICOAGULANTS: ICD-10-CM

## 2024-02-28 LAB
INR IN PPP BY COAGULATION ASSAY EXTERNAL: 3
PROTHROMBIN TIME (PT) IN PPP BY COAGULATION ASSAY EXTERNAL: NORMAL SECONDS

## 2024-02-28 NOTE — PROGRESS NOTES
Patient identification verified with 2 identifiers.    Location: [facility] Patient Self-Testing Program [phone]     Referring Physician: DR. JAIN  Enrollment/ Re-enrollment date: 9/9/24   INR Goal: 2.0-3.0  INR monitoring is per [facility] protocol.  Anticoagulation Medication: warfarin  Indication: Atrial Fibrillation/Atrial Flutter     Subjective   Bleeding signs/symptoms: No    Bruising: No   Major bleeding event: No  Thrombosis signs/symptoms: No  Thromboembolic event: No  Missed doses: No  Extra doses: No  Medication changes: No  Dietary changes: No  Change in health: No  Change in activity: No  Alcohol: No  Other concerns: No    Upcoming Procedures:  Does the Patient Have any upcoming procedures that require interruption in anticoagulation therapy? no  Does the patient require bridging? no      Anticoagulation Summary  As of 2/28/2024      INR goal:  2.0-3.0   TTR:  75.6 % (5.1 mo)   INR used for dosing:  3.00 (2/28/2024)   Weekly warfarin total:  50 mg               Assessment/Plan   Therapeutic     1. New dose: no change    2. Next INR: 2 weeks      Education provided to patient during the visit:  Patient instructed to call in interim with questions, concerns and changes.   Patient educated on compliance with dosing, follow up appointments, and prescribed plan of care.

## 2024-02-29 ENCOUNTER — OFFICE VISIT (OUTPATIENT)
Dept: PRIMARY CARE | Facility: CLINIC | Age: 74
End: 2024-02-29
Payer: MEDICARE

## 2024-02-29 ENCOUNTER — LAB (OUTPATIENT)
Dept: LAB | Facility: LAB | Age: 74
End: 2024-02-29
Payer: MEDICARE

## 2024-02-29 VITALS
WEIGHT: 182 LBS | SYSTOLIC BLOOD PRESSURE: 147 MMHG | HEIGHT: 66 IN | BODY MASS INDEX: 29.25 KG/M2 | DIASTOLIC BLOOD PRESSURE: 80 MMHG

## 2024-02-29 DIAGNOSIS — E11.69 TYPE 2 DIABETES MELLITUS WITH OTHER SPECIFIED COMPLICATION, WITHOUT LONG-TERM CURRENT USE OF INSULIN (MULTI): ICD-10-CM

## 2024-02-29 DIAGNOSIS — I48.91 ATRIAL FIBRILLATION, UNSPECIFIED TYPE (MULTI): ICD-10-CM

## 2024-02-29 DIAGNOSIS — F41.9 ANXIETY: ICD-10-CM

## 2024-02-29 DIAGNOSIS — E11.69 TYPE 2 DIABETES MELLITUS WITH OTHER SPECIFIED COMPLICATION, WITHOUT LONG-TERM CURRENT USE OF INSULIN (MULTI): Primary | ICD-10-CM

## 2024-02-29 DIAGNOSIS — Z00.00 ENCOUNTER FOR PREVENTIVE HEALTH EXAMINATION: ICD-10-CM

## 2024-02-29 DIAGNOSIS — F32.A DEPRESSION, UNSPECIFIED DEPRESSION TYPE: ICD-10-CM

## 2024-02-29 DIAGNOSIS — Z00.00 MEDICARE ANNUAL WELLNESS VISIT, SUBSEQUENT: ICD-10-CM

## 2024-02-29 DIAGNOSIS — R35.0 URINARY FREQUENCY: ICD-10-CM

## 2024-02-29 LAB
EST. AVERAGE GLUCOSE BLD GHB EST-MCNC: 128 MG/DL
HBA1C MFR BLD: 6.1 %

## 2024-02-29 PROCEDURE — 83036 HEMOGLOBIN GLYCOSYLATED A1C: CPT

## 2024-02-29 PROCEDURE — 36415 COLL VENOUS BLD VENIPUNCTURE: CPT

## 2024-02-29 PROCEDURE — 1036F TOBACCO NON-USER: CPT | Performed by: STUDENT IN AN ORGANIZED HEALTH CARE EDUCATION/TRAINING PROGRAM

## 2024-02-29 PROCEDURE — 4010F ACE/ARB THERAPY RXD/TAKEN: CPT | Performed by: STUDENT IN AN ORGANIZED HEALTH CARE EDUCATION/TRAINING PROGRAM

## 2024-02-29 PROCEDURE — G0439 PPPS, SUBSEQ VISIT: HCPCS | Performed by: STUDENT IN AN ORGANIZED HEALTH CARE EDUCATION/TRAINING PROGRAM

## 2024-02-29 PROCEDURE — 99214 OFFICE O/P EST MOD 30 MIN: CPT | Performed by: STUDENT IN AN ORGANIZED HEALTH CARE EDUCATION/TRAINING PROGRAM

## 2024-02-29 PROCEDURE — 3079F DIAST BP 80-89 MM HG: CPT | Performed by: STUDENT IN AN ORGANIZED HEALTH CARE EDUCATION/TRAINING PROGRAM

## 2024-02-29 PROCEDURE — 99397 PER PM REEVAL EST PAT 65+ YR: CPT | Performed by: STUDENT IN AN ORGANIZED HEALTH CARE EDUCATION/TRAINING PROGRAM

## 2024-02-29 PROCEDURE — 1126F AMNT PAIN NOTED NONE PRSNT: CPT | Performed by: STUDENT IN AN ORGANIZED HEALTH CARE EDUCATION/TRAINING PROGRAM

## 2024-02-29 PROCEDURE — 1170F FXNL STATUS ASSESSED: CPT | Performed by: STUDENT IN AN ORGANIZED HEALTH CARE EDUCATION/TRAINING PROGRAM

## 2024-02-29 PROCEDURE — 3077F SYST BP >= 140 MM HG: CPT | Performed by: STUDENT IN AN ORGANIZED HEALTH CARE EDUCATION/TRAINING PROGRAM

## 2024-02-29 RX ORDER — OXYBUTYNIN CHLORIDE 5 MG/1
5 TABLET, EXTENDED RELEASE ORAL DAILY
Qty: 90 TABLET | Refills: 1 | Status: SHIPPED | OUTPATIENT
Start: 2024-02-29 | End: 2024-03-25

## 2024-02-29 RX ORDER — WARFARIN SODIUM 5 MG/1
5 TABLET ORAL NIGHTLY
Qty: 90 TABLET | Refills: 1 | Status: SHIPPED | OUTPATIENT
Start: 2024-02-29 | End: 2024-05-30 | Stop reason: SDUPTHER

## 2024-02-29 RX ORDER — ESCITALOPRAM OXALATE 20 MG/1
20 TABLET ORAL DAILY
Qty: 90 TABLET | Refills: 1 | Status: SHIPPED | OUTPATIENT
Start: 2024-02-29 | End: 2024-05-30 | Stop reason: SDUPTHER

## 2024-02-29 RX ORDER — WARFARIN 2.5 MG/1
TABLET ORAL
Qty: 90 TABLET | Refills: 1 | Status: SHIPPED | OUTPATIENT
Start: 2024-02-29 | End: 2024-05-30 | Stop reason: SDUPTHER

## 2024-02-29 ASSESSMENT — ACTIVITIES OF DAILY LIVING (ADL)
MANAGING_FINANCES: INDEPENDENT
DOING_HOUSEWORK: INDEPENDENT
TAKING_MEDICATION: INDEPENDENT
BATHING: INDEPENDENT
DRESSING: INDEPENDENT
GROCERY_SHOPPING: INDEPENDENT

## 2024-02-29 ASSESSMENT — ENCOUNTER SYMPTOMS
OCCASIONAL FEELINGS OF UNSTEADINESS: 0
DEPRESSION: 1
LOSS OF SENSATION IN FEET: 0

## 2024-02-29 NOTE — PROGRESS NOTES
"Subjective   Patient ID: Mario Alberto Pena is a 73 y.o. female who presents for Medicare Annual Wellness Visit Subsequent (Med refill).    HPI   Routine fu, wellness exam, yearly physical. Longitudinal follow-up.      Pt presents for a wellness exam. Pt has been feeling down lately because her  has stage 4 lung cancer and has been given 6 months to a year to live. She has been experiencing dry heaves and worsening memory which she attributes to anxiety from this situation. Denies SI/HI.    Review of Systems  A 12 point review of systems was performed and was negative unless mentioned above.    Objective   Ht 1.676 m (5' 6\")   Wt 82.6 kg (182 lb)   BMI 29.38 kg/m²     Physical Exam  GEN: conversant, NAD  HEENT: PERRL, EOMI, MMM  NECK: supple, no carotid bruits appreciated b/l  CV: S1, S2, RRR  PULM: CTAB  ABD: soft, NT, ND  NEURO: no new gross focal deficits  EXT: no sig LE edema  PSYCH: appropriate affect    Assessment/Plan     #Anxiety and depression: Worse due to having to take care of her  who's health is failing. Will increase Lexapro dose.    #Hypertension: continue amlodipine 10mg daily, losartan     #hypercalcemia/primary hyperparathyroidism: seeing endocrinology, endocrine surgery evaluated the patient, no indication for surgical intervention at this time, per their report. Advised to continue off Calcium/vitamin D supplementation and follow up with Endocrinology. Pt to also follow up with genetics.     #Prosthetic valve endocarditis:   #paroxysmal AFib  -s/p surgical repeat AVR (2020). Follows with Cardiology.   -Continue warfarin. Interval specialist notes reviewed. Checking her INR at home  -gets prophylaxis for dental procedures      #CAD s/p NSTEMI: stent placed to LAD 2019. Follows with Cardiology     #DM2: Discussed lifestyle modifications. Continue diet and exercise and weight loss. Seeing endocrinology, last A1c 6.3 in 12/29/22.     #h/o Stroke: Continue statin. Continue warfarin. " Follows with Neurology.      #HLD: Currently on atorvastatin. Continue fish oil.     #Health maintenance:   - Mammogram done 3/2023  - Colonoscopy and EGD 11/2019, repeat recommended for 2024.   - Advised Shingrix.   - Advised yearly flu shot  - Advised GYN eval.   - Seeing ophthalmology and dentist annually   - UTD with PCV13 (2016) and PPSV23 (2015), advised to get Prevnar 20.  - Received COVID vaccines, booster  - TDaP given in 2023   -A1c level ordered, lexapro dose increased     RTC 3-4 mo     Sherif Lucero PGY-1  Internal Medicine

## 2024-03-13 ENCOUNTER — ANTICOAGULATION - WARFARIN VISIT (OUTPATIENT)
Dept: CARDIOLOGY | Facility: CLINIC | Age: 74
End: 2024-03-13
Payer: MEDICARE

## 2024-03-13 DIAGNOSIS — Z95.3 H/O HEART VALVE REPLACEMENT WITH BIOPROSTHETIC VALVE: Primary | ICD-10-CM

## 2024-03-13 DIAGNOSIS — Z79.01 LONG TERM (CURRENT) USE OF ANTICOAGULANTS: ICD-10-CM

## 2024-03-13 DIAGNOSIS — I48.11 LONGSTANDING PERSISTENT ATRIAL FIBRILLATION (MULTI): ICD-10-CM

## 2024-03-13 NOTE — PROGRESS NOTES
Patient identification verified with 2 identifiers.    Location: [facility] Patient Self-Testing Program [phone]    Referring Physician: DR. JAIN  Enrollment/ Re-enrollment date: 9/9/24   INR Goal: 2.0-3.0  INR monitoring is per [facility] protocol.  Anticoagulation Medication: warfarin  Indication: Aortic Valve Replacement    Subjective   Bleeding signs/symptoms: No    Bruising: No   Major bleeding event: No  Thrombosis signs/symptoms: No  Thromboembolic event: No  Missed doses: No  Extra doses: No  Medication changes: No  Dietary changes: No  Change in health: No  Change in activity: No  Alcohol: No  Other concerns: No    Upcoming Procedures:  Does the Patient Have any upcoming procedures that require interruption in anticoagulation therapy? no  Does the patient require bridging? no      Anticoagulation Summary  As of 3/13/2024      INR goal:  2.0-3.0   TTR:  77.7 % (5.6 mo)   INR used for dosing:  3.00 (3/13/2024)   Weekly warfarin total:  50 mg               Assessment/Plan   Therapeutic     1. New dose: no change    2. Next INR: 1 week  I SPOKE TO PT AND SHE CONFIRMED CURRENT WARFARIN DOSING AND WILL MAINTAIN THIS DOSE.      Education provided to patient during the visit:  Patient instructed to call in interim with questions, concerns and changes.

## 2024-03-22 DIAGNOSIS — R35.0 URINARY FREQUENCY: ICD-10-CM

## 2024-03-25 RX ORDER — OXYBUTYNIN CHLORIDE 5 MG/1
5 TABLET, EXTENDED RELEASE ORAL DAILY
Qty: 90 TABLET | Refills: 0 | Status: SHIPPED | OUTPATIENT
Start: 2024-03-25 | End: 2024-05-30 | Stop reason: SDUPTHER

## 2024-03-27 ENCOUNTER — LAB (OUTPATIENT)
Dept: LAB | Facility: LAB | Age: 74
End: 2024-03-27
Payer: MEDICARE

## 2024-03-27 ENCOUNTER — ANTICOAGULATION - WARFARIN VISIT (OUTPATIENT)
Dept: CARDIOLOGY | Facility: CLINIC | Age: 74
End: 2024-03-27

## 2024-03-27 DIAGNOSIS — I48.11 LONGSTANDING PERSISTENT ATRIAL FIBRILLATION (MULTI): ICD-10-CM

## 2024-03-27 DIAGNOSIS — Z95.3 H/O HEART VALVE REPLACEMENT WITH BIOPROSTHETIC VALVE: ICD-10-CM

## 2024-03-27 DIAGNOSIS — I25.10 CORONARY ARTERY DISEASE INVOLVING NATIVE HEART WITHOUT ANGINA PECTORIS, UNSPECIFIED VESSEL OR LESION TYPE: ICD-10-CM

## 2024-03-27 DIAGNOSIS — Z79.01 LONG TERM (CURRENT) USE OF ANTICOAGULANTS: ICD-10-CM

## 2024-03-27 LAB
CHOLEST SERPL-MCNC: 150 MG/DL (ref 0–199)
CHOLESTEROL/HDL RATIO: 2.3
HDLC SERPL-MCNC: 64.4 MG/DL
INR IN PPP BY COAGULATION ASSAY EXTERNAL: 3.1
LDLC SERPL CALC-MCNC: 57 MG/DL
NON HDL CHOLESTEROL: 86 MG/DL (ref 0–149)
PROTHROMBIN TIME (PT) IN PPP BY COAGULATION ASSAY EXTERNAL: NORMAL SECONDS
TRIGL SERPL-MCNC: 141 MG/DL (ref 0–149)
VLDL: 28 MG/DL (ref 0–40)

## 2024-03-27 PROCEDURE — 36415 COLL VENOUS BLD VENIPUNCTURE: CPT

## 2024-03-27 PROCEDURE — 80061 LIPID PANEL: CPT

## 2024-03-27 NOTE — PROGRESS NOTES
Patient identification verified with 2 identifiers.    Location: [facility] Patient Self-Testing Program [phone]    Referring Physician: DR. NAVEED JAIN  Enrollment/ Re-enrollment date: 9/9/2024   INR Goal: 2.0-3.0  INR monitoring is per [facility] protocol.  Anticoagulation Medication: warfarin  Indication: Atrial Fibrillation/Atrial Flutter, and Prosthetic Valve    Subjective   Bleeding signs/symptoms: No    Bruising: No   Major bleeding event: No  Thrombosis signs/symptoms: No  Thromboembolic event: No  Missed doses: No  Extra doses: No  Medication changes: No  INCREASED LEXIPRO TO 20 MG  Dietary changes: No  Change in health: No  Change in activity: No  Alcohol: No  Other concerns: No    Upcoming Procedures:  Does the Patient Have any upcoming procedures that require interruption in anticoagulation therapy? no  Does the patient require bridging? no      Anticoagulation Summary  As of 3/27/2024      INR goal:  2.0-3.0   TTR:  71.5 % (6 mo)   INR used for dosing:  3.10 (3/27/2024)   Weekly warfarin total:  47.5 mg               Assessment/Plan   Supratherapeutic     1. New dose:  PT TRENDING HIGH, MADE SMALL DECREASE TO SAT DOSE     2. Next INR: 1 week      Education provided to patient during the visit:  Patient instructed to call in interim with questions, concerns and changes.   Patient educated on interactions between medications and warfarin.   Patient educated on dietary consistency in vitamin k consumption.   Patient educated on affects of alcohol consumption while taking warfarin.   Patient educated on signs of bleeding/clotting.   Patient educated on compliance with dosing, follow up appointments, and prescribed plan of care.

## 2024-04-03 ENCOUNTER — ANTICOAGULATION - WARFARIN VISIT (OUTPATIENT)
Dept: CARDIOLOGY | Facility: CLINIC | Age: 74
End: 2024-04-03
Payer: MEDICARE

## 2024-04-03 DIAGNOSIS — Z95.3 H/O HEART VALVE REPLACEMENT WITH BIOPROSTHETIC VALVE: ICD-10-CM

## 2024-04-03 DIAGNOSIS — I48.11 LONGSTANDING PERSISTENT ATRIAL FIBRILLATION (MULTI): ICD-10-CM

## 2024-04-03 DIAGNOSIS — Z79.01 LONG TERM (CURRENT) USE OF ANTICOAGULANTS: ICD-10-CM

## 2024-04-03 NOTE — PROGRESS NOTES
Patient identification verified with 2 identifiers.    Location: [facility] Patient Self-Testing Program [phone]    Referring Physician: NAVEED JAIN  Enrollment/ Re-enrollment date: 2024   INR Goal: 2.0-3.0  INR monitoring is per [facility] protocol.  Anticoagulation Medication: warfarin  Indication: Mitral Valve Replacement & A Fib    Subjective   Bleeding signs/symptoms: No    Bruising: No   Major bleeding event: No  Thrombosis signs/symptoms: No  Thromboembolic event: No  Missed doses: No  Extra doses: No  Medication changes: No  Dietary changes: No  Change in health: No  Change in activity: No  Alcohol: No  Other concerns: No    Upcoming Procedures:  Does the Patient Have any upcoming procedures that require interruption in anticoagulation therapy? no  Does the patient require bridging? no      Anticoagulation Summary  As of 4/3/2024      INR goal:  2.0-3.0   TTR:  72.0 % (6.3 mo)   INR used for dosin.40 (4/3/2024)   Weekly warfarin total:  47.5 mg               Assessment/Plan   Therapeutic     1. New dose: no change    2. Next INR: 1 week      Education provided to patient during the visit:  Patient instructed to call in interim with questions, concerns and changes.   Patient educated on interactions between medications and warfarin.   Patient educated on dietary consistency in vitamin k consumption.   Patient educated on affects of alcohol consumption while taking warfarin.   Patient educated on signs of bleeding/clotting.   Patient educated on compliance with dosing, follow up appointments, and prescribed plan of care.

## 2024-04-10 ENCOUNTER — ANTICOAGULATION - WARFARIN VISIT (OUTPATIENT)
Dept: CARDIOLOGY | Facility: CLINIC | Age: 74
End: 2024-04-10
Payer: MEDICARE

## 2024-04-10 DIAGNOSIS — Z95.3 H/O HEART VALVE REPLACEMENT WITH BIOPROSTHETIC VALVE: Primary | ICD-10-CM

## 2024-04-10 DIAGNOSIS — I48.11 LONGSTANDING PERSISTENT ATRIAL FIBRILLATION (MULTI): ICD-10-CM

## 2024-04-10 NOTE — PROGRESS NOTES
Patient identification verified with 2 identifiers.    Location: [facility] Patient Self-Testing Program [phone]     Referring Physician: NAVEED JAIN  Enrollment/ Re-enrollment date: 9/9/2024   INR Goal: 2.0-3.0  INR monitoring is per [facility] protocol.  Anticoagulation Medication: warfarin  Indication: Mitral Valve Replacement & A Fib       Subjective   Bleeding signs/symptoms: No    Bruising: No   Major bleeding event: No  Thrombosis signs/symptoms: No  Thromboembolic event: No  Missed doses: No  Extra doses: No  Medication changes: No  Dietary changes: No  Change in health: No  Change in activity: No  Alcohol: No  Other concerns: No    Upcoming Procedures:  Does the Patient Have any upcoming procedures that require interruption in anticoagulation therapy? no  Does the patient require bridging? no      Anticoagulation Summary  As of 4/10/2024      INR goal:  2.0-3.0   TTR:  73.0% (6.5 mo)   INR used for dosing:  3.00 (4/10/2024)   Weekly warfarin total:  47.5 mg               Assessment/Plan   Therapeutic     1. New dose: no change    2. Next INR: 2 weeks      Education provided to patient during the visit:  Patient instructed to call in interim with questions, concerns and changes.   Patient educated on compliance with dosing, follow up appointments, and prescribed plan of care.

## 2024-04-15 ENCOUNTER — APPOINTMENT (OUTPATIENT)
Dept: ENDOCRINOLOGY | Facility: CLINIC | Age: 74
End: 2024-04-15
Payer: MEDICARE

## 2024-04-19 DIAGNOSIS — I10 BENIGN ESSENTIAL HYPERTENSION: Primary | ICD-10-CM

## 2024-04-19 RX ORDER — AMLODIPINE BESYLATE 10 MG/1
10 TABLET ORAL DAILY
Qty: 90 TABLET | Refills: 3 | Status: SHIPPED | OUTPATIENT
Start: 2024-04-19 | End: 2025-04-19

## 2024-04-19 NOTE — TELEPHONE ENCOUNTER
Patient is requesting a refill on her amlodipine 10mg to be sent to the [facility] in [facility]. Thank you.

## 2024-04-24 ENCOUNTER — ANTICOAGULATION - WARFARIN VISIT (OUTPATIENT)
Dept: CARDIOLOGY | Facility: CLINIC | Age: 74
End: 2024-04-24
Payer: MEDICARE

## 2024-04-24 ENCOUNTER — APPOINTMENT (OUTPATIENT)
Dept: HEMATOLOGY/ONCOLOGY | Facility: CLINIC | Age: 74
End: 2024-04-24
Payer: MEDICARE

## 2024-04-24 DIAGNOSIS — I48.11 LONGSTANDING PERSISTENT ATRIAL FIBRILLATION (MULTI): ICD-10-CM

## 2024-04-24 DIAGNOSIS — Z95.3 H/O HEART VALVE REPLACEMENT WITH BIOPROSTHETIC VALVE: ICD-10-CM

## 2024-04-24 NOTE — PROGRESS NOTES
Patient identification verified with 2 identifiers.    Location: [facility] Patient Self-Testing Program [phone]    Referring Physician: DR. JAIN  Enrollment/ Re-enrollment date: 9/9/2024   INR Goal: 2.0-3.0  INR monitoring is per [facility] protocol.  Anticoagulation Medication: warfarin  Indication: Atrial Fibrillation/Atrial Flutter    Subjective   Bleeding signs/symptoms: No    Bruising: No   Major bleeding event: No  Thrombosis signs/symptoms: No  Thromboembolic event: No  Missed doses: No  Extra doses: No  Medication changes: No  Dietary changes: No  Change in health: No  Change in activity: No  Alcohol: No  Other concerns: No    Upcoming Procedures:  Does the Patient Have any upcoming procedures that require interruption in anticoagulation therapy? no  Does the patient require bridging? no      Anticoagulation Summary  As of 4/24/2024      INR goal:  2.0-3.0   TTR:  74.8% (7 mo)   INR used for dosing:  3.00 (4/24/2024)   Weekly warfarin total:  47.5 mg               Assessment/Plan   Therapeutic     1. New dose: no change  SPOKE TO PT. CONFIRMED CURRENT DOSING INSTRUCTIONS AND PT REPEATED BACK CORRECTLY  2. Next INR: 2 weeks      Education provided to patient during the visit:  Patient instructed to call in interim with questions, concerns and changes.   Patient educated on compliance with dosing, follow up appointments, and prescribed plan of care.

## 2024-05-08 ENCOUNTER — ANTICOAGULATION - WARFARIN VISIT (OUTPATIENT)
Dept: CARDIOLOGY | Facility: CLINIC | Age: 74
End: 2024-05-08
Payer: MEDICARE

## 2024-05-08 DIAGNOSIS — Z95.3 H/O HEART VALVE REPLACEMENT WITH BIOPROSTHETIC VALVE: Primary | ICD-10-CM

## 2024-05-08 DIAGNOSIS — I48.11 LONGSTANDING PERSISTENT ATRIAL FIBRILLATION (MULTI): ICD-10-CM

## 2024-05-08 LAB
INR IN PPP BY COAGULATION ASSAY EXTERNAL: 3.5
PROTHROMBIN TIME (PT) IN PPP BY COAGULATION ASSAY EXTERNAL: NORMAL SECONDS

## 2024-05-08 NOTE — PROGRESS NOTES
Patient identification verified with 2 identifiers.    Location: [facility] Patient Self-Testing Program [phone]    Referring Physician: DR. JAIN  Enrollment/ Re-enrollment date: 9/9/24   INR Goal: 2.0-3.0  INR monitoring is per [facility] protocol.  Anticoagulation Medication: warfarin  Indication: Atrial Fibrillation/Atrial Flutter    Subjective   Bleeding signs/symptoms: No    Bruising: No   Major bleeding event: No  Thrombosis signs/symptoms: No  Thromboembolic event: No  Missed doses: No  Extra doses: No  Medication changes: Yes  PT STATES SHE HAS BEEN TYLENOL TWICE DAILY FOR THE PAST WEEK. SHE WILL NOT BE TAKING ANYMORE AS SHE ISN'T HURTING.  Dietary changes: No  Change in health: No  Change in activity: No  Alcohol: No  Other concerns: No    Upcoming Procedures:  Does the Patient Have any upcoming procedures that require interruption in anticoagulation therapy? no  Does the patient require bridging? no      Anticoagulation Summary  As of 5/8/2024      INR goal:  2.0-3.0   TTR:  70.1% (7.4 mo)   INR used for dosing:  3.50 (5/8/2024)   Weekly warfarin total:  47.5 mg               Assessment/Plan   Supratherapeutic     1. New dose:  PT WILL HOLD TODAY'S DOSE OF WARFARIN, THEN MAINTAIN CURRENT WEEKLY DOSE.       2. Next INR: 1 week      Education provided to patient during the visit:  Patient instructed to call in interim with questions, concerns and changes.   Patient educated on interactions between medications and warfarin.

## 2024-05-15 ENCOUNTER — ANTICOAGULATION - WARFARIN VISIT (OUTPATIENT)
Dept: CARDIOLOGY | Facility: CLINIC | Age: 74
End: 2024-05-15
Payer: MEDICARE

## 2024-05-15 DIAGNOSIS — I48.11 LONGSTANDING PERSISTENT ATRIAL FIBRILLATION (MULTI): ICD-10-CM

## 2024-05-15 DIAGNOSIS — Z95.3 H/O HEART VALVE REPLACEMENT WITH BIOPROSTHETIC VALVE: ICD-10-CM

## 2024-05-15 NOTE — PROGRESS NOTES
Patient identification verified with 2 identifiers.    Location: [facility] Patient Self-Testing Program [phone]    Referring Physician: DR. JAIN  Enrollment/ Re-enrollment date: 10/2024   INR Goal: 2.0-3.0  INR monitoring is per  protocol.  Anticoagulation Medication: warfarin  Indication:  HEART VALVE REPLACEMENT    Subjective   Bleeding signs/symptoms: No    Bruising: No   Major bleeding event: No  Thrombosis signs/symptoms: No  Thromboembolic event: No  Missed doses: No  Extra doses: No  Medication changes: No  Dietary changes: No  Change in health: No  Change in activity: No  Alcohol: No  Other concerns: No    Upcoming Procedures:  Does the Patient Have any upcoming procedures that require interruption in anticoagulation therapy? no  Does the patient require bridging? no      Anticoagulation Summary  As of 5/15/2024      INR goal:  2.0-3.0   TTR:  69.1% (7.7 mo)   INR used for dosin.70 (5/15/2024)   Weekly warfarin total:  47.5 mg               Assessment/Plan   Therapeutic     1. New dose: no change  SPOKE TO PT. CONFIRMED CURRENT DOSING INSTRUCTIONS AND PT VERBALIZED CORRECTLY  2. Next INR: 1 week      Education provided to patient during the visit:  Patient instructed to call in interim with questions, concerns and changes.   Patient educated on compliance with dosing, follow up appointments, and prescribed plan of care.

## 2024-05-22 ENCOUNTER — ANTICOAGULATION - WARFARIN VISIT (OUTPATIENT)
Dept: CARDIOLOGY | Facility: CLINIC | Age: 74
End: 2024-05-22
Payer: MEDICARE

## 2024-05-22 DIAGNOSIS — I48.11 LONGSTANDING PERSISTENT ATRIAL FIBRILLATION (MULTI): ICD-10-CM

## 2024-05-22 DIAGNOSIS — Z95.3 H/O HEART VALVE REPLACEMENT WITH BIOPROSTHETIC VALVE: Primary | ICD-10-CM

## 2024-05-22 LAB
INR IN PPP BY COAGULATION ASSAY EXTERNAL: 3.6
PROTHROMBIN TIME (PT) IN PPP BY COAGULATION ASSAY EXTERNAL: NORMAL SECONDS

## 2024-05-22 NOTE — PROGRESS NOTES
Patient identification verified with 2 identifiers.    Location: [facility] Patient Self-Testing Program [phone]    Referring Physician: DR. JAIN  Enrollment/ Re-enrollment date: 9/9/24   INR Goal: 2.0-3.0  INR monitoring is per [facility] protocol.  Anticoagulation Medication: warfarin  Indication: Mitral Valve Replacement    Subjective   Bleeding signs/symptoms: No    Bruising: No   Major bleeding event: No  Thrombosis signs/symptoms: No  Thromboembolic event: No  Missed doses: No  Extra doses: No  Medication changes: Yes  PT HAS BEEN TAKING TYLENOL TWICE  DAILY  FOR PAST 4 DAYS.  SHE WILL STOP TAKING.  Dietary changes: No  Change in health: No  Change in activity: No  Alcohol: No  Other concerns: No    Upcoming Procedures:  Does the Patient Have any upcoming procedures that require interruption in anticoagulation therapy? no  Does the patient require bridging? no      Anticoagulation Summary  As of 5/22/2024      INR goal:  2.0-3.0   TTR:  68.1% (7.9 mo)   INR used for dosing:  3.60 (5/22/2024)   Weekly warfarin total:  47.5 mg               Assessment/Plan   Supratherapeutic     1. New dose:  PT WILL HOLD TODAY'S DOSE OF WARFARIN, THEN MAINTAIN CURRENT DOSE SCHEDULE.       2. Next INR: 1 week    I SPOKE TO PT CONFIRMING CURRENT WARFARIN DOSING.   PT WILL  HOLD TODAY'S DOSE OF WARFARIN THEN MAINTAIN CURRENT WEEKLY DOSE SCHEDULE.  PT WILL STOP TAKING TYLENOL.  RETEST IN ONE WEEK.  PT VERBALIZED UNDERSTANDING OF THESE DOSING INSTRUCTIONS.      Education provided to patient during the visit:  Patient instructed to call in interim with questions, concerns and changes.   Patient educated on interactions between medications and warfarin.   Patient educated on signs of bleeding/clotting.

## 2024-05-29 ENCOUNTER — ANTICOAGULATION - WARFARIN VISIT (OUTPATIENT)
Dept: CARDIOLOGY | Facility: CLINIC | Age: 74
End: 2024-05-29
Payer: MEDICARE

## 2024-05-29 DIAGNOSIS — I48.11 LONGSTANDING PERSISTENT ATRIAL FIBRILLATION (MULTI): ICD-10-CM

## 2024-05-29 DIAGNOSIS — Z95.3 H/O HEART VALVE REPLACEMENT WITH BIOPROSTHETIC VALVE: Primary | ICD-10-CM

## 2024-05-29 LAB
INR IN PPP BY COAGULATION ASSAY EXTERNAL: 2.9
PROTHROMBIN TIME (PT) IN PPP BY COAGULATION ASSAY EXTERNAL: NORMAL SECONDS

## 2024-05-29 NOTE — PROGRESS NOTES
Patient identification verified with 2 identifiers.    Location: [facility] Patient Self-Testing Program [phone]    Referring Physician: Mallika Yin PA-C   Enrollment/ Re-enrollment date: 2024   INR Goal: 2.0-3.0  INR monitoring is per [facility] protocol.  Anticoagulation Medication: warfarin  Indication: Atrial Fibrillation/Atrial Flutter    Subjective   Bleeding signs/symptoms: No    Bruising: No   Major bleeding event: No  Thrombosis signs/symptoms: No  Thromboembolic event: No  Missed doses: No  Extra doses: No  Medication changes: Yes  pt using lidocaine cream for arthritis and would like to continue  Dietary changes: No  Change in health: No  Change in activity: No  Alcohol: No  Other concerns: No    Upcoming Procedures:  Does the Patient Have any upcoming procedures that require interruption in anticoagulation therapy? no  Does the patient require bridging? no      Anticoagulation Summary  As of 2024      INR goal:  2.0-3.0   TTR:  66.5% (8.1 mo)   INR used for dosin.90 (2024)   Weekly warfarin total:  45 mg               Assessment/Plan   Therapeutic     1. New dose:  dose reduced to 5 mg , , Fri 7.5 mg other days.      2. Next INR:  five days; Monday 6/3/24      Education provided to patient during the visit:  Patient instructed to call in interim with questions, concerns and changes.

## 2024-05-30 ENCOUNTER — OFFICE VISIT (OUTPATIENT)
Dept: PRIMARY CARE | Facility: CLINIC | Age: 74
End: 2024-05-30
Payer: MEDICARE

## 2024-05-30 VITALS — DIASTOLIC BLOOD PRESSURE: 72 MMHG | SYSTOLIC BLOOD PRESSURE: 119 MMHG

## 2024-05-30 DIAGNOSIS — E11.69 TYPE 2 DIABETES MELLITUS WITH OTHER SPECIFIED COMPLICATION, WITHOUT LONG-TERM CURRENT USE OF INSULIN (MULTI): ICD-10-CM

## 2024-05-30 DIAGNOSIS — I48.0 PAROXYSMAL A-FIB (MULTI): ICD-10-CM

## 2024-05-30 DIAGNOSIS — F41.9 ANXIETY: ICD-10-CM

## 2024-05-30 DIAGNOSIS — I48.91 ATRIAL FIBRILLATION, UNSPECIFIED TYPE (MULTI): ICD-10-CM

## 2024-05-30 DIAGNOSIS — I10 PRIMARY HYPERTENSION: ICD-10-CM

## 2024-05-30 DIAGNOSIS — R35.0 URINARY FREQUENCY: ICD-10-CM

## 2024-05-30 DIAGNOSIS — Z12.31 ENCOUNTER FOR SCREENING MAMMOGRAM FOR MALIGNANT NEOPLASM OF BREAST: Primary | ICD-10-CM

## 2024-05-30 DIAGNOSIS — F32.A DEPRESSION, UNSPECIFIED DEPRESSION TYPE: ICD-10-CM

## 2024-05-30 PROCEDURE — 3048F LDL-C <100 MG/DL: CPT | Performed by: STUDENT IN AN ORGANIZED HEALTH CARE EDUCATION/TRAINING PROGRAM

## 2024-05-30 PROCEDURE — 3074F SYST BP LT 130 MM HG: CPT | Performed by: STUDENT IN AN ORGANIZED HEALTH CARE EDUCATION/TRAINING PROGRAM

## 2024-05-30 PROCEDURE — 3044F HG A1C LEVEL LT 7.0%: CPT | Performed by: STUDENT IN AN ORGANIZED HEALTH CARE EDUCATION/TRAINING PROGRAM

## 2024-05-30 PROCEDURE — 4010F ACE/ARB THERAPY RXD/TAKEN: CPT | Performed by: STUDENT IN AN ORGANIZED HEALTH CARE EDUCATION/TRAINING PROGRAM

## 2024-05-30 PROCEDURE — 3078F DIAST BP <80 MM HG: CPT | Performed by: STUDENT IN AN ORGANIZED HEALTH CARE EDUCATION/TRAINING PROGRAM

## 2024-05-30 PROCEDURE — 99214 OFFICE O/P EST MOD 30 MIN: CPT | Performed by: STUDENT IN AN ORGANIZED HEALTH CARE EDUCATION/TRAINING PROGRAM

## 2024-05-30 PROCEDURE — G2211 COMPLEX E/M VISIT ADD ON: HCPCS | Performed by: STUDENT IN AN ORGANIZED HEALTH CARE EDUCATION/TRAINING PROGRAM

## 2024-05-30 RX ORDER — WARFARIN SODIUM 5 MG/1
5 TABLET ORAL NIGHTLY
Qty: 90 TABLET | Refills: 1 | Status: SHIPPED | OUTPATIENT
Start: 2024-05-30

## 2024-05-30 RX ORDER — WARFARIN 2.5 MG/1
TABLET ORAL
Qty: 90 TABLET | Refills: 1 | Status: SHIPPED | OUTPATIENT
Start: 2024-05-30

## 2024-05-30 RX ORDER — ESCITALOPRAM OXALATE 20 MG/1
20 TABLET ORAL DAILY
Qty: 90 TABLET | Refills: 1 | Status: SHIPPED | OUTPATIENT
Start: 2024-05-30

## 2024-05-30 RX ORDER — OXYBUTYNIN CHLORIDE 5 MG/1
5 TABLET, EXTENDED RELEASE ORAL DAILY
Qty: 90 TABLET | Refills: 1 | Status: SHIPPED | OUTPATIENT
Start: 2024-05-30

## 2024-05-30 NOTE — PROGRESS NOTES
Subjective   Patient ID: Mario Alberto Pena is a 73 y.o. female who presents for Follow-up (Med refill).    HPI   Routine fu.    Patient has lots of stress in her life.  is sick with terminal cancer, has been in and out of hospital.    At our last visit, patient's escitalopram dose was increased, she thinks this has helped.      Review of Systems  12-point ROS reviewed and was negative unless otherwise noted in HPI.    Objective   /72     Physical Exam  GEN: conversant, NAD  HEENT: PERRL, EOMI, MMM  NECK: supple  CV: S1, S2, RRR  PULM: CTAB  ABD: ND  NEURO: no new gross focal deficits  EXT: no sig LE edema  PSYCH: appropriate affect    Assessment/Plan     #Anxiety and depression: continue escitalopram. Offered emotional support.     #Hypertension: continue amlodipine 10mg daily, losartan     #hypercalcemia/primary hyperparathyroidism: seeing endocrinology, endocrine surgery evaluated the patient, no indication for surgical intervention at this time, per their report. Advised to continue off Calcium/vitamin D supplementation and follow up with Endocrinology. Pt to also follow up with genetics.     #Prosthetic valve endocarditis:   #paroxysmal AFib  -s/p surgical repeat AVR (2020). Follows with Cardiology.   -Continue warfarin. Interval specialist notes reviewed. Checking her INR at home  -gets prophylaxis for dental procedures      #CAD s/p NSTEMI: stent placed to LAD 2019. Follows with Cardiology     #DM2: Discussed lifestyle modifications. Continue diet and exercise and weight loss. Seeing endocrinology, last A1c 6.1 in 12/29/22.     #h/o Stroke: Continue statin. Continue warfarin. Follows with Neurology.      #HLD: Currently on atorvastatin. Continue fish oil.     #Health maintenance:   - Mammogram ordered  - Colonoscopy and EGD 11/2019, repeat recommended for 2024.   - Advised Shingrix.   - Advised yearly flu shot  - Advised GYN eval.   - Seeing ophthalmology and dentist annually   - UTD with PCV13  (2016) and PPSV23 (2015), advised to get Prevnar 20.  - Received COVID vaccines, booster  - TDaP given in 2023   - Longitudinal care.     RTC 3-4 mo

## 2024-06-03 ENCOUNTER — ANTICOAGULATION - WARFARIN VISIT (OUTPATIENT)
Dept: CARDIOLOGY | Facility: CLINIC | Age: 74
End: 2024-06-03
Payer: MEDICARE

## 2024-06-03 DIAGNOSIS — I48.11 LONGSTANDING PERSISTENT ATRIAL FIBRILLATION (MULTI): ICD-10-CM

## 2024-06-03 DIAGNOSIS — Z95.3 H/O HEART VALVE REPLACEMENT WITH BIOPROSTHETIC VALVE: Primary | ICD-10-CM

## 2024-06-03 NOTE — PROGRESS NOTES
Patient identification verified with 2 identifiers.    Location: [facility] Patient Self-Testing Program [phone]    Referring Physician: DR JAIN  Enrollment/ Re-enrollment date: 24   INR Goal: 2.0-3.0  INR monitoring is per [facility] protocol.  Anticoagulation Medication: warfarin  Indication: Aortic Valve Replacement    Subjective   Bleeding signs/symptoms: No    Bruising: No   Major bleeding event: No  Thrombosis signs/symptoms: No  Thromboembolic event: No  Missed doses: No  Extra doses: No  Medication changes: Yes  PT CONTINUES USING LIDOCAINE CREAM.  Dietary changes: No  Change in health: No  Change in activity: No  Alcohol: No  Other concerns: No    Upcoming Procedures:  Does the Patient Have any upcoming procedures that require interruption in anticoagulation therapy? no  Does the patient require bridging? no      Anticoagulation Summary  As of 6/3/2024      INR goal:  2.0-3.0   TTR:  67.2% (8.3 mo)   INR used for dosin.60 (6/3/2024)   Weekly warfarin total:  45 mg               Assessment/Plan   Therapeutic     1. New dose: no change    2. Next INR: 1 week  I SPOKE TO PT CONFIRMING CURRENT WARFARIN DOSING.   PT WILL  MAINTAIN CURRENT WEEKLY DOSE SCHEDULE.  PT VERBALIZED UNDERSTANDING OF THESE DOSING INSTRUCTIONS.        Education provided to patient during the visit:  Patient instructed to call in interim with questions, concerns and changes.   Patient educated on interactions between medications and warfarin.

## 2024-06-10 ENCOUNTER — ANTICOAGULATION - WARFARIN VISIT (OUTPATIENT)
Dept: CARDIOLOGY | Facility: CLINIC | Age: 74
End: 2024-06-10
Payer: MEDICARE

## 2024-06-10 DIAGNOSIS — Z95.3 H/O HEART VALVE REPLACEMENT WITH BIOPROSTHETIC VALVE: Primary | ICD-10-CM

## 2024-06-10 DIAGNOSIS — I48.11 LONGSTANDING PERSISTENT ATRIAL FIBRILLATION (MULTI): ICD-10-CM

## 2024-06-10 NOTE — PROGRESS NOTES
Patient identification verified with 2 identifiers.    Location: [facility] Patient Self-Testing Program [phone]    Referring Physician: DR. JAIN  Enrollment/ Re-enrollment date: 2024   INR Goal: 2.0-3.0  INR monitoring is per [facility] protocol.  Anticoagulation Medication: warfarin  Indication:  BIOPROSTHETIC HEART VALVE    Subjective   Bleeding signs/symptoms: No    Bruising: No   Major bleeding event: No  Thrombosis signs/symptoms: No  Thromboembolic event: No  Missed doses: No  Extra doses: No  Medication changes: No  Dietary changes: No  Change in health: No  Change in activity: No  Alcohol: No  Other concerns: No    Upcoming Procedures:  Does the Patient Have any upcoming procedures that require interruption in anticoagulation therapy? no  Does the patient require bridging? no      Anticoagulation Summary  As of 6/10/2024      INR goal:  2.0-3.0   TTR:  68.1% (8.5 mo)   INR used for dosin.40 (6/10/2024)   Weekly warfarin total:  45 mg               Assessment/Plan   Therapeutic     1. New dose: no change SPOKE TO PT CONFIRMED CURRENT DOSING INSTRUCTIONS AND PT VERBALIZED CORRECTLY   2. Next INR: 2 weeks      Education provided to patient during the visit:  Patient instructed to call in interim with questions, concerns and changes.   Patient educated on compliance with dosing, follow up appointments, and prescribed plan of care.

## 2024-06-24 ENCOUNTER — ANTICOAGULATION - WARFARIN VISIT (OUTPATIENT)
Dept: CARDIOLOGY | Facility: CLINIC | Age: 74
End: 2024-06-24
Payer: MEDICARE

## 2024-06-24 DIAGNOSIS — Z95.3 H/O HEART VALVE REPLACEMENT WITH BIOPROSTHETIC VALVE: Primary | ICD-10-CM

## 2024-06-24 DIAGNOSIS — I48.11 LONGSTANDING PERSISTENT ATRIAL FIBRILLATION (MULTI): ICD-10-CM

## 2024-06-24 LAB
INR IN PPP BY COAGULATION ASSAY EXTERNAL: 2.6 (ref 2–3)
PROTHROMBIN TIME (PT) IN PPP BY COAGULATION ASSAY EXTERNAL: NORMAL SECONDS

## 2024-06-24 NOTE — PROGRESS NOTES
Patient identification verified with 2 identifiers.    Location: [facility] Patient Self-Testing Program [phone]    Referring Physician: Dr. Silas Lawson  Enrollment/ Re-enrollment date: 2024   INR Goal: 2.0-3.0  INR monitoring is per [facility] protocol.  Anticoagulation Medication: warfarin  Indication:  BIOPROSTHETIC HEART VALVE    Subjective   Bleeding signs/symptoms: No    Bruising: No   Major bleeding event: No  Thrombosis signs/symptoms: No  Thromboembolic event: No  Missed doses: No  Extra doses: No  Medication changes: No  Dietary changes: No  Change in health: No  Change in activity: No  Alcohol: No  Other concerns: No    Upcoming Procedures:  Does the Patient Have any upcoming procedures that require interruption in anticoagulation therapy? no  Does the patient require bridging? no      Anticoagulation Summary  As of 2024      INR goal:  2.0-3.0   TTR:  69.8% (9 mo)   INR used for dosin.60 (2024)   Weekly warfarin total:  45 mg               Assessment/Plan   Therapeutic     1. New dose: no change SPOKE TO PT CONFIRMED CURRENT DOSING INSTRUCTIONS AND PT VERBALIZED CORRECTLY   2. Next INR: 2 weeks      Education provided to patient during the visit:  Patient instructed to call in interim with questions, concerns and changes.   Patient educated on compliance with dosing, follow up appointments, and prescribed plan of care.

## 2024-06-25 ENCOUNTER — APPOINTMENT (OUTPATIENT)
Dept: CARDIOLOGY | Facility: HOSPITAL | Age: 74
End: 2024-06-25
Payer: MEDICARE

## 2024-06-26 ENCOUNTER — OFFICE VISIT (OUTPATIENT)
Dept: CARDIOLOGY | Facility: HOSPITAL | Age: 74
End: 2024-06-26
Payer: MEDICARE

## 2024-06-26 VITALS
OXYGEN SATURATION: 93 % | HEART RATE: 80 BPM | HEIGHT: 66 IN | DIASTOLIC BLOOD PRESSURE: 81 MMHG | SYSTOLIC BLOOD PRESSURE: 131 MMHG | BODY MASS INDEX: 29.54 KG/M2 | WEIGHT: 183.8 LBS

## 2024-06-26 DIAGNOSIS — E78.00 PURE HYPERCHOLESTEROLEMIA: ICD-10-CM

## 2024-06-26 DIAGNOSIS — I10 BENIGN ESSENTIAL HYPERTENSION: ICD-10-CM

## 2024-06-26 DIAGNOSIS — Z95.2 S/P AVR (AORTIC VALVE REPLACEMENT): ICD-10-CM

## 2024-06-26 DIAGNOSIS — I48.0 PAROXYSMAL ATRIAL FIBRILLATION (MULTI): ICD-10-CM

## 2024-06-26 DIAGNOSIS — I25.10 CORONARY ARTERY DISEASE INVOLVING NATIVE CORONARY ARTERY OF NATIVE HEART WITHOUT ANGINA PECTORIS: Primary | ICD-10-CM

## 2024-06-26 LAB
ATRIAL RATE: 80 BPM
P AXIS: 41 DEGREES
P OFFSET: 199 MS
P ONSET: 140 MS
PR INTERVAL: 152 MS
Q ONSET: 216 MS
QRS COUNT: 13 BEATS
QRS DURATION: 94 MS
QT INTERVAL: 356 MS
QTC CALCULATION(BAZETT): 410 MS
QTC FREDERICIA: 392 MS
R AXIS: 79 DEGREES
T AXIS: 82 DEGREES
T OFFSET: 394 MS
VENTRICULAR RATE: 80 BPM

## 2024-06-26 PROCEDURE — 4010F ACE/ARB THERAPY RXD/TAKEN: CPT | Performed by: INTERNAL MEDICINE

## 2024-06-26 PROCEDURE — 3044F HG A1C LEVEL LT 7.0%: CPT | Performed by: INTERNAL MEDICINE

## 2024-06-26 PROCEDURE — 3075F SYST BP GE 130 - 139MM HG: CPT | Performed by: INTERNAL MEDICINE

## 2024-06-26 PROCEDURE — 3079F DIAST BP 80-89 MM HG: CPT | Performed by: INTERNAL MEDICINE

## 2024-06-26 PROCEDURE — G2211 COMPLEX E/M VISIT ADD ON: HCPCS | Performed by: INTERNAL MEDICINE

## 2024-06-26 PROCEDURE — 99214 OFFICE O/P EST MOD 30 MIN: CPT | Performed by: INTERNAL MEDICINE

## 2024-06-26 PROCEDURE — 93005 ELECTROCARDIOGRAM TRACING: CPT | Performed by: INTERNAL MEDICINE

## 2024-06-26 PROCEDURE — 3048F LDL-C <100 MG/DL: CPT | Performed by: INTERNAL MEDICINE

## 2024-06-26 PROCEDURE — 1160F RVW MEDS BY RX/DR IN RCRD: CPT | Performed by: INTERNAL MEDICINE

## 2024-06-26 PROCEDURE — 1036F TOBACCO NON-USER: CPT | Performed by: INTERNAL MEDICINE

## 2024-06-26 PROCEDURE — 1159F MED LIST DOCD IN RCRD: CPT | Performed by: INTERNAL MEDICINE

## 2024-06-26 RX ORDER — LIDOCAINE 40 MG/G
CREAM TOPICAL 4 TIMES DAILY PRN
COMMUNITY

## 2024-06-26 NOTE — PROGRESS NOTES
Primary Care Physician: Jigar Hurst MD  Date of Visit: 06/26/2024  2:20 PM EDT  Location of visit: [facility]     Chief Complaint:   Chief Complaint   Patient presents with    Follow-up        HPI / Summary:   Mario Alberto Pena is a 73 y.o. female presents for followup.  She has no cardiac complaints.  She is able to walk up and down stairs and walk for 30 minutes without chest pain or shortness of breath.  The patient denies chest pain, shortness of breath, palpitations, lightheadedness, syncope, orthopnea, paroxysmal nocturnal dyspnea, lower extremity edema, or bleeding problems.          Past Medical History:   Diagnosis Date    Allergic contact dermatitis due to plants, except food 08/24/2018    Poison ivy    Cerebrovascular disease, unspecified 03/13/2015    Ill-defined cerebrovascular disease    Encounter for follow-up examination after completed treatment for conditions other than malignant neoplasm 11/25/2019    Hospital discharge follow-up    Encounter for screening mammogram for malignant neoplasm of breast 09/21/2016    Encounter for mammogram to establish baseline mammogram    Hyperlipidemia, unspecified 03/13/2015    Dyslipidemia    Low back pain, unspecified 10/09/2019    Acute exacerbation of chronic low back pain    Low back pain, unspecified 10/09/2019    Acute exacerbation of chronic low back pain    Low back pain, unspecified 07/25/2017    Acute exacerbation of chronic low back pain    Other specified postprocedural states     H/O colonoscopy    Personal history of other diseases of the musculoskeletal system and connective tissue 04/05/2018    History of muscle spasm    Personal history of other diseases of urinary system 03/15/2018    History of hematuria    Personal history of other medical treatment     H/O bone density study    Personal history of other medical treatment     History of mammogram    Personal history of other specified conditions 05/02/2020    History of nasal  congestion    Personal history of other specified conditions 11/25/2019    History of bacteremia    Rash and other nonspecific skin eruption 03/28/2016    Skin rash    Strain of muscle, fascia and tendon of lower back, initial encounter 04/06/2016    Lumbar strain    Urinary tract infection, site not specified 04/05/2018    Acute UTI    Vitamin D deficiency, unspecified 02/08/2016    Vitamin D deficiency        Past Surgical History:   Procedure Laterality Date    CT ANGIO NECK  8/28/2020    CT NECK ANGIO W AND WO IV CONTRAST 8/28/2020 [facility] [place] [facility]    HYSTERECTOMY  03/13/2015    Hysterectomy    OTHER SURGICAL HISTORY  01/22/2018    Aortic Valve Repair          Social History:   reports that she has quit smoking. Her smoking use included cigarettes. She has never used smokeless tobacco. She reports that she does not drink alcohol and does not use drugs.     Family History:  family history includes Alzheimer's disease in her mother; Colon cancer in her mother.      Allergies:  Allergies   Allergen Reactions    Heparin Other     Low Platelet Count    Lisinopril Cough    Penicillins Unknown       Outpatient Medications:  Current Outpatient Medications   Medication Instructions    acetaminophen (Tylenol Extra Strength) 500 mg tablet 2 tablets, oral, Every 6 hours PRN    albuterol 90 mcg/actuation inhaler 2 puffs, inhalation, Every 4 hours PRN    amLODIPine (NORVASC) 10 mg, oral, Daily    aspirin (Aspirin Childrens) 81 mg chewable tablet 1 tablet, oral, Daily    clindamycin (Cleocin) 300 mg capsule 2 capsules, oral, Once, 60 MINUTES PRIOR TO DENTAL CLEANINGS AND PROCEDURES<BR>    docusate sodium (Colace) 100 mg capsule 1 capsule, oral, 2 times daily PRN    escitalopram (LEXAPRO) 20 mg, oral, Daily    ezetimibe (ZETIA) 10 mg, oral, Daily    folic acid (Folvite) 1 mg tablet 1 tablet, oral, Daily    glucos sul 2KCl/msm/chond/C/Mn (GLUCOSAMINE CHONDROITIN ORAL) 1 tablet, oral, Daily, Glucosamine and Chondroit-MV-Min3  "490-800-73-0.5 mg ORAL Tab    ipratropium-albuteroL (Duo-Neb) 0.5-2.5 mg/3 mL nebulizer solution 3 mL, inhalation, Every 2 hour PRN    lidocaine (LMX) 4 % cream Topical, 4 times daily PRN    losartan (COZAAR) 25 mg, oral, Daily    metoprolol tartrate (Lopressor) 25 mg tablet 0.5 tablets, oral, Every 12 hours    multivitamin-min-iron-FA-vit K (Adults Multivitamin) 18 mg iron-400 mcg-25 mcg tablet 1 tablet, oral, Daily    oxybutynin XL (DITROPAN-XL) 5 mg, oral, Daily    oxygen (O2) therapy 3 L, Continuous    pantoprazole (PROTONIX) 40 mg, oral, Daily before breakfast    rosuvastatin (CRESTOR) 40 mg, oral, Nightly    warfarin (Coumadin) 10 mg tablet 1 tablet, oral, Daily    warfarin (Coumadin) 2.5 mg tablet 1 tab qd    warfarin (COUMADIN) 5 mg, oral, Nightly, Daily as directed       Physical Exam:  Vitals:    06/26/24 1434   BP: 131/81   BP Location: Left arm   Pulse: 80   SpO2: 93%   Weight: 83.4 kg (183 lb 12.8 oz)   Height: 1.676 m (5' 6\")     Wt Readings from Last 5 Encounters:   06/26/24 83.4 kg (183 lb 12.8 oz)   02/29/24 82.6 kg (182 lb)   10/30/23 82.1 kg (181 lb)   10/11/23 81.2 kg (179 lb)   08/01/23 79.8 kg (176 lb)     Body mass index is 29.67 kg/m².   General: Well-developed well-nourished in no acute distress  HEENT: Normocephalic atraumatic  Neck: Supple, JVP is normal negative hepatojugular reflux 2+ carotid pulses without bruit  Pulmonary: Normal respiratory effort, clear to auscultation  Cardiovascular: No right ventricular heave, normal S1 and S2, no murmurs rubs or gallops  Abdomen: Soft nontender nondistended  Extremities: Warm without edema absent right radial pulse 2+ left radial pulse  Neurologic: Alert and oriented x3  Psychiatric: Normal mood and affect     Last Labs:  CMP:  Recent Labs     10/18/23  1229 07/12/23  1010 05/23/23  1022 05/08/23  1016     --  140 138   K 4.2  --  5.1 5.2     --  107 104   CO2 23  --  26 23   ANIONGAP 15  --  12 16   BUN 20  --  22 37*   CREATININE " 1.02 1.12* 0.95 1.48*   EGFR 58*  --   --   --    GLUCOSE 114*  --  117* 115*     Recent Labs     07/12/23  1010 04/20/23  1038 12/29/22  1127 09/15/22  1025   ALBUMIN 4.8 4.7 4.3 4.3   ALKPHOS  --  74 78 84   ALT  --  27 20 13   AST  --  19 23 17   BILITOT  --  0.5 0.6 0.5     CBC:  Recent Labs     10/18/23  1229 04/20/23  1038 12/29/22  1127   WBC 8.9 7.3 7.1   HGB 13.6 13.1 11.9*   HCT 42.7 40.0 37.7    202 197   MCV 93 92 93     COAG:   Recent Labs     06/24/24  0000 06/10/24  0000   INR 2.60 2.40     HEME/ENDO:  Recent Labs     02/29/24  1209 10/18/23  1229 12/29/22  1127 06/21/22  1053 03/24/22  1313 04/22/21  1146 11/02/20  1156 08/26/20  1910 07/30/20  0619 07/27/20  2121   FERRITIN  --   --   --   --   --  797* 996*  --   --  1,145*   IRONSAT  --   --   --   --   --  25 35  --   --  8*   TSH  --  2.42  --  1.59  --   --   --  3.44   < >  --    HGBA1C 6.1*  --  6.3*  --  5.6 5.9  --  4.6  --   --     < > = values in this interval not displayed.      CARDIAC:   Recent Labs     07/12/22  0510 11/02/20  1156 08/28/20  0053 08/24/20  0940 08/10/20  2107 07/30/20  0619 11/15/19  0620 11/11/19  1700   LDH  --  170 242 244   < > 402*   < >  --    *  --   --   --   --  786*  --  602*    < > = values in this interval not displayed.     Recent Labs     03/27/24  0911 10/18/23  1229 12/29/22  1127 11/11/21  0922 04/22/21  1146   CHOL 150 171 166 165 178   LDLF  --   --  59 70 77   LDLCALC 57 68  --   --   --    HDL 64.4 69.0 65.7 63.2 68.9   TRIG 141 172* 208* 161* 159*       Last Cardiology Tests:  ECG:  An electrocardiogram performed today that I reviewed shows normal sinus rhythm nonspecific T wave abnormality.    Echo:  7/13/22  Echo Results:  CONCLUSIONS:   1. The left ventricular systolic function is normal with a 60-65% estimated ejection fraction.   2. Poorly visualized anatomical structures due to suboptimal image quality.   3. Mildly elevated RVSP.   4. There is a stentless aortic valve  bioprosthesis.   5. No valvular vegetations seen on this limited study. Consider SYDNI if felt clinically indicated.       Cath:  11/20/2019  CONCLUSIONS:   1. Left main: no significant angiographic disease.   2. LAD: 100% mid-vessel occlusion, faint distal filling from right to left collaterals.   3. LCx: no significant angiographic disease.   4. RCA: no significant angiographic disease.   5. LVEDP 3mmHg.   6. Successful PCI to 100% mid-LAD lesion with a 2.25 x 15mm Resolute MARILEE post-dilated distally with a 2.25mm NC balloon at 24atm and proximally with a 2.5mm NC balloon at 24atm.    Stress Test:  Stress Results:  No results found for this or any previous visit from the past 365 days.         Cardiac Imaging:        Assessment/Plan   Diagnoses and all orders for this visit:  Coronary artery disease involving native coronary artery of native heart without angina pectoris  Benign essential hypertension  S/P AVR (aortic valve replacement)  Pure hypercholesterolemia  Paroxysmal atrial fibrillation (Multi)  -     ECG 12 lead (Clinic Performed)  -     Comprehensive metabolic panel; Future  -     CBC; Future    In summary Ms. Pena is a pleasant 73 year-old white female with a past medical history significant for severe aortic stenosis with probable bicuspid valve status post AVR with subsequent prosthetic valve endocarditis and redo AVR with reconstruction of the LVOT with mildly reduced LV function, hypertension, hyperlipidemia, and prior remote cryptogenic stroke on chronic anticoagulation with subsequent likely embolic events, and nonobstructive carotid atherosclerosis, postoperative atrial fibrillation, coronary artery disease status post PCI of her mid LAD in the setting of a possible embolic NSTEMI, and possible HIT.  She is asymptomatic from a cardiac perspective.  She is euvolemic on exam.  She should continue her current cardiovascular medications.  I ordered labs as below.  We will see her back in follow-up  in 6 months.      Orders:  Orders Placed This Encounter   Procedures    Comprehensive metabolic panel    CBC    ECG 12 lead (Clinic Performed)      Followup Appts:  Future Appointments   Date Time Provider Department Center   9/17/2024  1:30 PM Jigar Hurst MD [id number] [place]           ____________________________________________________________  Silas Lawson MD  [facility]  [facility]

## 2024-07-08 ENCOUNTER — ANTICOAGULATION - WARFARIN VISIT (OUTPATIENT)
Dept: CARDIOLOGY | Facility: CLINIC | Age: 74
End: 2024-07-08
Payer: MEDICARE

## 2024-07-08 DIAGNOSIS — Z95.3 H/O HEART VALVE REPLACEMENT WITH BIOPROSTHETIC VALVE: Primary | ICD-10-CM

## 2024-07-08 DIAGNOSIS — I48.11 LONGSTANDING PERSISTENT ATRIAL FIBRILLATION (MULTI): ICD-10-CM

## 2024-07-08 NOTE — PROGRESS NOTES
Patient identification verified with 2 identifiers.    Location: [facility] Patient Self-Testing Program [phone]    Referring Physician: Dr. Silas Lawson  Enrollment/ Re-enrollment date: 9/9/2024   INR Goal: 2.0-3.0  INR monitoring is per [facility] protocol.  Anticoagulation Medication: warfarin  Indication: BIOPROSTHETIC HEART VALVE    Subjective   Bleeding signs/symptoms: No    Bruising: No   Major bleeding event: No  Thrombosis signs/symptoms: No  Thromboembolic event: No  Missed doses: No  Extra doses: No  Medication changes: No  Dietary changes: Yes  Less Vit K this week  Change in health: No  Change in activity: No  Alcohol: No  Other concerns: No    Upcoming Procedures:  Does the Patient Have any upcoming procedures that require interruption in anticoagulation therapy? no  Does the patient require bridging? no      Anticoagulation Summary  As of 7/8/2024      INR goal:  2.0-3.0   TTR:  70.3% (9.5 mo)   INR used for dosing:  3.10 (7/8/2024)   Weekly warfarin total:  45 mg               Assessment/Plan   Supratherapeutic     1. New dose: no change    2. Next INR: 1 week      Education provided to patient during the visit:  Patient instructed to call in interim with questions, concerns and changes.   Patient educated on interactions between medications and warfarin.   Patient educated on dietary consistency in vitamin k consumption.   Patient educated on affects of alcohol consumption while taking warfarin.   Patient educated on signs of bleeding/clotting.

## 2024-07-15 ENCOUNTER — ANTICOAGULATION - WARFARIN VISIT (OUTPATIENT)
Dept: CARDIOLOGY | Facility: CLINIC | Age: 74
End: 2024-07-15
Payer: MEDICARE

## 2024-07-15 DIAGNOSIS — Z95.2 S/P AVR (AORTIC VALVE REPLACEMENT): ICD-10-CM

## 2024-07-15 DIAGNOSIS — I48.11 LONGSTANDING PERSISTENT ATRIAL FIBRILLATION (MULTI): ICD-10-CM

## 2024-07-15 DIAGNOSIS — Z95.3 H/O HEART VALVE REPLACEMENT WITH BIOPROSTHETIC VALVE: Primary | ICD-10-CM

## 2024-07-15 LAB
INR IN PPP BY COAGULATION ASSAY EXTERNAL: 2.8
PROTHROMBIN TIME (PT) IN PPP BY COAGULATION ASSAY EXTERNAL: NORMAL

## 2024-07-15 NOTE — PROGRESS NOTES
Patient identification verified with 2 identifiers.    Location: [facility] Patient Self-Testing Program [phone]    Referring Physician: Silas Lawson MD   Enrollment/ Re-enrollment date: 2024   INR Goal: 2.0-3.0  INR monitoring is per [facility] protocol.  Anticoagulation Medication: warfarin  Indication: Atrial Fibrillation/Atrial Flutter and Aortic Valve Replacement    Subjective   Bleeding signs/symptoms: No    Bruising: No   Major bleeding event: No  Thrombosis signs/symptoms: No  Thromboembolic event: No  Missed doses: No  Extra doses: No  Medication changes: No  Dietary changes: No  Change in health: No  Change in activity: No  Alcohol: No  Other concerns: No    Upcoming Procedures:  Does the Patient Have any upcoming procedures that require interruption in anticoagulation therapy? no  Does the patient require bridging? no      Anticoagulation Summary  As of 7/15/2024      INR goal:  2.0-3.0   TTR:  70.2% (9.7 mo)   INR used for dosin.80 (7/15/2024)   Weekly warfarin total:  45 mg               Assessment/Plan   Therapeutic     1. New dose: no change    2. Next INR: 2 weeks      Education provided to patient during the visit:  Patient instructed to call in interim with questions, concerns and changes.

## 2024-07-26 ENCOUNTER — LAB (OUTPATIENT)
Dept: LAB | Facility: LAB | Age: 74
End: 2024-07-26
Payer: MEDICARE

## 2024-07-26 DIAGNOSIS — I48.0 PAROXYSMAL ATRIAL FIBRILLATION (MULTI): ICD-10-CM

## 2024-07-26 LAB
ALBUMIN SERPL BCP-MCNC: 4.7 G/DL (ref 3.4–5)
ALP SERPL-CCNC: 83 U/L (ref 33–136)
ALT SERPL W P-5'-P-CCNC: 25 U/L (ref 7–45)
ANION GAP SERPL CALC-SCNC: 15 MMOL/L (ref 10–20)
AST SERPL W P-5'-P-CCNC: 21 U/L (ref 9–39)
BILIRUB SERPL-MCNC: 0.6 MG/DL (ref 0–1.2)
BUN SERPL-MCNC: 11 MG/DL (ref 6–23)
CALCIUM SERPL-MCNC: 11 MG/DL (ref 8.6–10.6)
CHLORIDE SERPL-SCNC: 105 MMOL/L (ref 98–107)
CO2 SERPL-SCNC: 25 MMOL/L (ref 21–32)
CREAT SERPL-MCNC: 0.96 MG/DL (ref 0.5–1.05)
EGFRCR SERPLBLD CKD-EPI 2021: 62 ML/MIN/1.73M*2
ERYTHROCYTE [DISTWIDTH] IN BLOOD BY AUTOMATED COUNT: 13.2 % (ref 11.5–14.5)
GLUCOSE SERPL-MCNC: 171 MG/DL (ref 74–99)
HCT VFR BLD AUTO: 41.4 % (ref 36–46)
HGB BLD-MCNC: 13.4 G/DL (ref 12–16)
MCH RBC QN AUTO: 29.6 PG (ref 26–34)
MCHC RBC AUTO-ENTMCNC: 32.4 G/DL (ref 32–36)
MCV RBC AUTO: 92 FL (ref 80–100)
NRBC BLD-RTO: 0 /100 WBCS (ref 0–0)
PLATELET # BLD AUTO: 223 X10*3/UL (ref 150–450)
POTASSIUM SERPL-SCNC: 4.8 MMOL/L (ref 3.5–5.3)
PROT SERPL-MCNC: 7 G/DL (ref 6.4–8.2)
RBC # BLD AUTO: 4.52 X10*6/UL (ref 4–5.2)
SODIUM SERPL-SCNC: 140 MMOL/L (ref 136–145)
WBC # BLD AUTO: 6.8 X10*3/UL (ref 4.4–11.3)

## 2024-07-26 PROCEDURE — 36415 COLL VENOUS BLD VENIPUNCTURE: CPT

## 2024-07-26 PROCEDURE — 85027 COMPLETE CBC AUTOMATED: CPT

## 2024-07-26 PROCEDURE — 80053 COMPREHEN METABOLIC PANEL: CPT

## 2024-07-29 ENCOUNTER — ANTICOAGULATION - WARFARIN VISIT (OUTPATIENT)
Dept: CARDIOLOGY | Facility: CLINIC | Age: 74
End: 2024-07-29
Payer: MEDICARE

## 2024-07-29 DIAGNOSIS — Z95.2 S/P AVR (AORTIC VALVE REPLACEMENT): Primary | ICD-10-CM

## 2024-07-29 DIAGNOSIS — I48.11 LONGSTANDING PERSISTENT ATRIAL FIBRILLATION (MULTI): ICD-10-CM

## 2024-07-29 DIAGNOSIS — Z95.3 H/O HEART VALVE REPLACEMENT WITH BIOPROSTHETIC VALVE: ICD-10-CM

## 2024-07-29 LAB
INR IN PPP BY COAGULATION ASSAY EXTERNAL: 2.7 (ref 2–3)
PROTHROMBIN TIME (PT) IN PPP BY COAGULATION ASSAY EXTERNAL: NORMAL

## 2024-07-29 NOTE — PROGRESS NOTES
Patient identification verified with 2 identifiers.    Location: [facility] Patient Self-Testing Program [phone]    Referring Physician: Silas Lawson MD   Enrollment/ Re-enrollment date: 2024   INR Goal: 2.0-3.0  INR monitoring is per [facility] protocol.  Anticoagulation Medication: warfarin  Indication: Atrial Fibrillation/Atrial Flutter and Aortic Valve Replacement    Subjective   Bleeding signs/symptoms: No    Bruising: No   Major bleeding event: No  Thrombosis signs/symptoms: No  Thromboembolic event: No  Missed doses: No  Extra doses: No  Medication changes: No  Dietary changes: No  Change in health: No  Change in activity: No  Alcohol: No  Other concerns: No    Upcoming Procedures:  Does the Patient Have any upcoming procedures that require interruption in anticoagulation therapy? no  Does the patient require bridging? no      Anticoagulation Summary  As of 2024      INR goal:  2.0-3.0   TTR:  71.6% (10.2 mo)   INR used for dosin.70 (2024)   Weekly warfarin total:  45 mg               Assessment/Plan   Therapeutic     1. New dose: no change  Spoke with pt and confirmed dosing schedule.  2. Next INR: 2 weeks      Education provided to patient during the visit:  Patient instructed to call in interim with questions, concerns and changes.

## 2024-08-12 ENCOUNTER — ANTICOAGULATION - WARFARIN VISIT (OUTPATIENT)
Dept: CARDIOLOGY | Facility: CLINIC | Age: 74
End: 2024-08-12
Payer: MEDICARE

## 2024-08-12 DIAGNOSIS — Z95.2 S/P AVR (AORTIC VALVE REPLACEMENT): Primary | ICD-10-CM

## 2024-08-12 DIAGNOSIS — I48.11 LONGSTANDING PERSISTENT ATRIAL FIBRILLATION (MULTI): ICD-10-CM

## 2024-08-12 DIAGNOSIS — Z95.3 H/O HEART VALVE REPLACEMENT WITH BIOPROSTHETIC VALVE: ICD-10-CM

## 2024-08-12 NOTE — PROGRESS NOTES
Patient identification verified with 2 identifiers.    Location: [facility] Patient Self-Testing Program [phone]    Referring Physician: Silas Lawson MD   Enrollment/ Re-enrollment date: 2024   INR Goal: 2.0-3.0  INR monitoring is per [facility] protocol.  Anticoagulation Medication: warfarin  Indication: Atrial Fibrillation/Atrial Flutter and Aortic Valve Replacement    Subjective   Bleeding signs/symptoms: No    Bruising: No   Major bleeding event: No  Thrombosis signs/symptoms: No  Thromboembolic event: No  Missed doses: No  Extra doses: No  Medication changes: No  Dietary changes: No  Change in health: No  Change in activity: No  Alcohol: No  Other concerns: No    Upcoming Procedures:  Does the Patient Have any upcoming procedures that require interruption in anticoagulation therapy? no  Does the patient require bridging? no      Anticoagulation Summary  As of 2024      INR goal:  2.0-3.0   TTR:  72.8% (10.6 mo)   INR used for dosin.80 (2024)   Weekly warfarin total:  45 mg               Received faxed INR self-test results and called patient. Pt identification verified with 2 pt identifiers. Current dose schedule reviewed with patient, patient verbalized understanding. Pt instructed to call in interim with questions, concerns or changes.  DAMASO Lobato RN    Assessment/Plan   Therapeutic     1. New dose: no change    2. Next INR: 2 weeks      Education provided to patient during the visit:  Patient instructed to call in interim with questions, concerns and changes.   Patient educated on interactions between medications and warfarin.   Patient educated on compliance with dosing, follow up appointments, and prescribed plan of care.

## 2024-08-26 ENCOUNTER — ANTICOAGULATION - WARFARIN VISIT (OUTPATIENT)
Dept: CARDIOLOGY | Facility: CLINIC | Age: 74
End: 2024-08-26
Payer: MEDICARE

## 2024-08-26 DIAGNOSIS — Z95.3 H/O HEART VALVE REPLACEMENT WITH BIOPROSTHETIC VALVE: ICD-10-CM

## 2024-08-26 DIAGNOSIS — I48.11 LONGSTANDING PERSISTENT ATRIAL FIBRILLATION (MULTI): ICD-10-CM

## 2024-08-26 DIAGNOSIS — Z95.2 S/P AVR (AORTIC VALVE REPLACEMENT): Primary | ICD-10-CM

## 2024-08-26 DIAGNOSIS — Z95.3 H/O HEART VALVE REPLACEMENT WITH BIOPROSTHETIC VALVE: Primary | ICD-10-CM

## 2024-08-26 LAB
INR IN PPP BY COAGULATION ASSAY EXTERNAL: 3.2
PROTHROMBIN TIME (PT) IN PPP BY COAGULATION ASSAY EXTERNAL: NORMAL

## 2024-08-26 NOTE — PROGRESS NOTES
Patient identification verified with 2 identifiers.    Location: [facility] Patient Self-Testing Program [phone]    Referring Physician: DR. NAVEED JAIN  Enrollment/ Re-enrollment date: 9/9/2024   INR Goal: 2.0-3.0  INR monitoring is per [facility] protocol.  Anticoagulation Medication: warfarin  Indication: Aortic Valve Replacement    Subjective   Bleeding signs/symptoms: No    Bruising: No   Major bleeding event: No  Thrombosis signs/symptoms: No  Thromboembolic event: No  Missed doses: No  Extra doses: No  Medication changes: Yes  PT USING MORE TYELNOL THAN USUAL FOR BACK PAIN  Dietary changes: No  Change in health: No  Change in activity: No  Alcohol: No  Other concerns: No    Upcoming Procedures:  Does the Patient Have any upcoming procedures that require interruption in anticoagulation therapy? no  Does the patient require bridging? no      Anticoagulation Summary  As of 8/26/2024      INR goal:  2.0-3.0   TTR:  71.8% (11.1 mo)   INR used for dosing:  3.20 (8/26/2024)   Weekly warfarin total:  42.5 mg               Assessment/Plan   Supratherapeutic     1. New dose:  TWD. SPOKE TO PT. CONFIRMED NEW DOSING INSTRUCTIONS. PT REPEATED CORRECTLY     2. Next INR: 1 week      Education provided to patient during the visit:  Patient instructed to call in interim with questions, concerns and changes.   Patient educated on interactions between medications and warfarin.   Patient educated on compliance with dosing, follow up appointments, and prescribed plan of care.

## 2024-09-03 ENCOUNTER — ANTICOAGULATION - WARFARIN VISIT (OUTPATIENT)
Dept: CARDIOLOGY | Facility: CLINIC | Age: 74
End: 2024-09-03
Payer: MEDICARE

## 2024-09-03 DIAGNOSIS — Z95.2 S/P AVR (AORTIC VALVE REPLACEMENT): Primary | ICD-10-CM

## 2024-09-03 DIAGNOSIS — Z95.3 H/O HEART VALVE REPLACEMENT WITH BIOPROSTHETIC VALVE: ICD-10-CM

## 2024-09-03 DIAGNOSIS — I48.11 LONGSTANDING PERSISTENT ATRIAL FIBRILLATION (MULTI): ICD-10-CM

## 2024-09-03 LAB
INR IN PPP BY COAGULATION ASSAY EXTERNAL: 1.8
PROTHROMBIN TIME (PT) IN PPP BY COAGULATION ASSAY EXTERNAL: NORMAL

## 2024-09-03 NOTE — PROGRESS NOTES
Patient identification verified with 2 identifiers.    Location: [facility] Patient Self-Testing Program [phone]    Referring Physician: NAVEED JAIN  Enrollment/ Re-enrollment date: 2025   INR Goal: 2.0-3.0  INR monitoring is per [facility] protocol.  Anticoagulation Medication: warfarin  Indication: Aortic Valve Replacement & A FIB    Subjective   Bleeding signs/symptoms: No    Bruising: No   Major bleeding event: No  Thrombosis signs/symptoms: No  Thromboembolic event: No  Missed doses: No  Extra doses: No  Medication changes: No  Dietary changes: No  Change in health: No  Change in activity: No  Alcohol: No  Other concerns: No    Upcoming Procedures:  Does the Patient Have any upcoming procedures that require interruption in anticoagulation therapy? no  Does the patient require bridging? no      Anticoagulation Summary  As of 9/3/2024      INR goal:  2.0-3.0   TTR:  71.8% (11.4 mo)   INR used for dosin.80 (9/3/2024)   Weekly warfarin total:  45 mg               Assessment/Plan   Subtherapeutic     1. New dose:  RESUMED PRIOR DOSING     2. Next INR: 1 week      Education provided to patient during the visit:  Patient instructed to call in interim with questions, concerns and changes.   Patient educated on interactions between medications and warfarin.   Patient educated on dietary consistency in vitamin k consumption.   Patient educated on affects of alcohol consumption while taking warfarin.   Patient educated on signs of bleeding/clotting.   Patient educated on compliance with dosing, follow up appointments, and prescribed plan of care.

## 2024-09-09 ENCOUNTER — ANTICOAGULATION - WARFARIN VISIT (OUTPATIENT)
Dept: CARDIOLOGY | Facility: CLINIC | Age: 74
End: 2024-09-09
Payer: MEDICARE

## 2024-09-09 DIAGNOSIS — I48.11 LONGSTANDING PERSISTENT ATRIAL FIBRILLATION (MULTI): ICD-10-CM

## 2024-09-09 DIAGNOSIS — Z95.2 S/P AVR (AORTIC VALVE REPLACEMENT): Primary | ICD-10-CM

## 2024-09-09 LAB
INR IN PPP BY COAGULATION ASSAY EXTERNAL: 2.1
PROTHROMBIN TIME (PT) IN PPP BY COAGULATION ASSAY EXTERNAL: NORMAL

## 2024-09-09 NOTE — PROGRESS NOTES
Patient identification verified with 2 identifiers.    Location: [facility] Patient Self-Testing Program [phone]    Referring Physician: DR. NAVEED JAIN  Enrollment/ Re-enrollment date: 2025   INR Goal: 2.0-3.0  INR monitoring is per [facility] protocol.  Anticoagulation Medication: warfarin  Indication: Aortic Valve Replacement    Subjective   Bleeding signs/symptoms: No    Bruising: No   Major bleeding event: No  Thrombosis signs/symptoms: No  Thromboembolic event: No  Missed doses: No  Extra doses: No  Medication changes: No  Dietary changes: No  Change in health: No  Change in activity: No  Alcohol: No  Other concerns: No    Upcoming Procedures:  Does the Patient Have any upcoming procedures that require interruption in anticoagulation therapy? no  Does the patient require bridging? no      Anticoagulation Summary  As of 2024      INR goal:  2.0-3.0   TTR:  71.2% (11.6 mo)   INR used for dosin.10 (2024)   Weekly warfarin total:  45 mg               Assessment/Plan   Therapeutic     1. New dose: no change  SPOKE TO PT, CONFIRMED CURRENT DOSING INSTRUCTIONS.  2. Next INR: 1 week      Education provided to patient during the visit:  Patient instructed to call in interim with questions, concerns and changes.   Patient educated on compliance with dosing, follow up appointments, and prescribed plan of care.

## 2024-09-16 ENCOUNTER — ANTICOAGULATION - WARFARIN VISIT (OUTPATIENT)
Dept: CARDIOLOGY | Facility: CLINIC | Age: 74
End: 2024-09-16
Payer: MEDICARE

## 2024-09-16 DIAGNOSIS — Z95.2 S/P AVR (AORTIC VALVE REPLACEMENT): Primary | ICD-10-CM

## 2024-09-16 DIAGNOSIS — I48.11 LONGSTANDING PERSISTENT ATRIAL FIBRILLATION (MULTI): ICD-10-CM

## 2024-09-16 LAB
INR IN PPP BY COAGULATION ASSAY EXTERNAL: 2.2
PROTHROMBIN TIME (PT) IN PPP BY COAGULATION ASSAY EXTERNAL: NORMAL

## 2024-09-16 NOTE — PROGRESS NOTES
Patient identification verified with 2 identifiers.    Location: [facility] Patient Self-Testing Program [phone]    Referring Physician: DR. NAVEED JAIN  Enrollment/ Re-enrollment date: 2025   INR Goal: 2.0-3.0  INR monitoring is per [facility] protocol.  Anticoagulation Medication: warfarin  Indication: Aortic Valve Replacement    Subjective   Bleeding signs/symptoms: No    Bruising: No   Major bleeding event: No  Thrombosis signs/symptoms: No  Thromboembolic event: No  Missed doses: No  Extra doses: No  Medication changes: No  Dietary changes: No  Change in health: No  Change in activity: No  Alcohol: No  Other concerns: No    Upcoming Procedures:  Does the Patient Have any upcoming procedures that require interruption in anticoagulation therapy? no  Does the patient require bridging? no      Anticoagulation Summary  As of 2024      INR goal:  2.0-3.0   TTR:  71.7% (11.8 mo)   INR used for dosin.20 (2024)   Weekly warfarin total:  45 mg               Assessment/Plan   Therapeutic     1. New dose: no change  SPOKE TO PT. CONFIRMED CURRENT DOSING INSTRUCTIONS  2. Next INR: 2 weeks      Education provided to patient during the visit:  Patient instructed to call in interim with questions, concerns and changes.   Patient educated on compliance with dosing, follow up appointments, and prescribed plan of care.

## 2024-09-17 ENCOUNTER — APPOINTMENT (OUTPATIENT)
Dept: PRIMARY CARE | Facility: CLINIC | Age: 74
End: 2024-09-17
Payer: MEDICARE

## 2024-09-17 VITALS
WEIGHT: 187 LBS | SYSTOLIC BLOOD PRESSURE: 135 MMHG | HEIGHT: 66 IN | DIASTOLIC BLOOD PRESSURE: 80 MMHG | BODY MASS INDEX: 30.05 KG/M2

## 2024-09-17 DIAGNOSIS — I10 PRIMARY HYPERTENSION: ICD-10-CM

## 2024-09-17 DIAGNOSIS — F41.9 ANXIETY: ICD-10-CM

## 2024-09-17 DIAGNOSIS — R35.0 URINARY FREQUENCY: ICD-10-CM

## 2024-09-17 DIAGNOSIS — E11.8 CONTROLLED TYPE 2 DIABETES MELLITUS WITH COMPLICATION, WITHOUT LONG-TERM CURRENT USE OF INSULIN (MULTI): Primary | ICD-10-CM

## 2024-09-17 DIAGNOSIS — F32.A DEPRESSION, UNSPECIFIED DEPRESSION TYPE: ICD-10-CM

## 2024-09-17 DIAGNOSIS — I48.0 PAROXYSMAL A-FIB (MULTI): ICD-10-CM

## 2024-09-17 DIAGNOSIS — I48.91 ATRIAL FIBRILLATION, UNSPECIFIED TYPE (MULTI): ICD-10-CM

## 2024-09-17 PROCEDURE — 3044F HG A1C LEVEL LT 7.0%: CPT | Performed by: STUDENT IN AN ORGANIZED HEALTH CARE EDUCATION/TRAINING PROGRAM

## 2024-09-17 PROCEDURE — 1123F ACP DISCUSS/DSCN MKR DOCD: CPT | Performed by: STUDENT IN AN ORGANIZED HEALTH CARE EDUCATION/TRAINING PROGRAM

## 2024-09-17 PROCEDURE — 3048F LDL-C <100 MG/DL: CPT | Performed by: STUDENT IN AN ORGANIZED HEALTH CARE EDUCATION/TRAINING PROGRAM

## 2024-09-17 PROCEDURE — 99214 OFFICE O/P EST MOD 30 MIN: CPT | Performed by: STUDENT IN AN ORGANIZED HEALTH CARE EDUCATION/TRAINING PROGRAM

## 2024-09-17 PROCEDURE — 4010F ACE/ARB THERAPY RXD/TAKEN: CPT | Performed by: STUDENT IN AN ORGANIZED HEALTH CARE EDUCATION/TRAINING PROGRAM

## 2024-09-17 PROCEDURE — 3075F SYST BP GE 130 - 139MM HG: CPT | Performed by: STUDENT IN AN ORGANIZED HEALTH CARE EDUCATION/TRAINING PROGRAM

## 2024-09-17 PROCEDURE — 3079F DIAST BP 80-89 MM HG: CPT | Performed by: STUDENT IN AN ORGANIZED HEALTH CARE EDUCATION/TRAINING PROGRAM

## 2024-09-17 PROCEDURE — G2211 COMPLEX E/M VISIT ADD ON: HCPCS | Performed by: STUDENT IN AN ORGANIZED HEALTH CARE EDUCATION/TRAINING PROGRAM

## 2024-09-17 PROCEDURE — 1158F ADVNC CARE PLAN TLK DOCD: CPT | Performed by: STUDENT IN AN ORGANIZED HEALTH CARE EDUCATION/TRAINING PROGRAM

## 2024-09-17 RX ORDER — OXYBUTYNIN CHLORIDE 5 MG/1
5 TABLET, EXTENDED RELEASE ORAL DAILY
Qty: 90 TABLET | Refills: 1 | Status: SHIPPED | OUTPATIENT
Start: 2024-09-17

## 2024-09-17 RX ORDER — WARFARIN SODIUM 5 MG/1
5 TABLET ORAL NIGHTLY
Qty: 90 TABLET | Refills: 1 | Status: SHIPPED | OUTPATIENT
Start: 2024-09-17

## 2024-09-17 RX ORDER — ESCITALOPRAM OXALATE 20 MG/1
20 TABLET ORAL DAILY
Qty: 90 TABLET | Refills: 1 | Status: SHIPPED | OUTPATIENT
Start: 2024-09-17

## 2024-09-17 RX ORDER — WARFARIN 2.5 MG/1
TABLET ORAL
Qty: 90 TABLET | Refills: 1 | Status: SHIPPED | OUTPATIENT
Start: 2024-09-17

## 2024-09-17 NOTE — PROGRESS NOTES
"Subjective   Patient ID: Mario Alberto Pena is a 74 y.o. female who presents for Follow-up (Med refill).    HPI   Routine fu.  Med refill.    Her  remains ill with terminal cancer, has been in and out of the hospital, this has understandably been difficult for her.    She is feeling OK physically.  Review of Systems  12-point ROS reviewed and was negative unless otherwise noted in HPI.    Objective   /80   Ht 1.676 m (5' 6\")   Wt 84.8 kg (187 lb)   BMI 30.18 kg/m²     Physical Exam  GEN: conversant, NAD  HEENT: PERRL, EOMI, MMM  NECK: supple, no carotid bruits appreciated b/l  CV: S1, S2, RRR, +murmur  PULM: CTAB  ABD: soft, NT, ND  NEURO: no new gross focal deficits  EXT: no sig LE edema  PSYCH: appropriate affect    Assessment/Plan     #Anxiety and depression: continue escitalopram. Offered emotional support.     #Hypertension: continue amlodipine 10mg daily, losartan     #hypercalcemia/primary hyperparathyroidism: seeing endocrinology, endocrine surgery evaluated the patient, no indication for surgical intervention at this time, per their report. Advised to continue off Calcium/vitamin D supplementation and follow up with Endocrinology. Pt to also follow up with genetics.     #Prosthetic valve endocarditis:   #paroxysmal AFib  -s/p surgical repeat AVR (2020). Follows with Cardiology.   -Continue warfarin. Interval specialist notes reviewed. Checking her INR at home  -gets prophylaxis for dental procedures      #CAD s/p NSTEMI: stent placed to LAD 2019. Follows with Cardiology     #DM2: Discussed lifestyle modifications. Continue diet and exercise and weight loss. Seeing endocrinology.     #h/o Stroke: Continue statin. Continue warfarin. Follows with Neurology.      #HLD: Currently on atorvastatin. Continue fish oil.     #Health maintenance:   - Mammogram done 6/2024  - Colonoscopy and EGD 11/2019, repeat recommended for 2024, she declines for now pending 's medical issues.   - Advised " Shingrix.   - Advised yearly flu shot  - Advised GYN eval.   - Seeing ophthalmology and dentist annually   - advised to get Prevnar 20.  - Received COVID vaccines, booster  - TDaP given in 2023   - Longitudinal care.     RTC 3-4 mo

## 2024-09-18 ENCOUNTER — TELEPHONE (OUTPATIENT)
Dept: CARDIOLOGY | Facility: HOSPITAL | Age: 74
End: 2024-09-18
Payer: MEDICARE

## 2024-09-18 DIAGNOSIS — I10 BENIGN ESSENTIAL HYPERTENSION: ICD-10-CM

## 2024-09-18 DIAGNOSIS — I48.0 PAROXYSMAL A-FIB (MULTI): ICD-10-CM

## 2024-09-18 RX ORDER — PANTOPRAZOLE SODIUM 40 MG/1
40 TABLET, DELAYED RELEASE ORAL
Qty: 90 TABLET | Refills: 3 | Status: SHIPPED | OUTPATIENT
Start: 2024-09-18 | End: 2025-09-18

## 2024-09-18 RX ORDER — METOPROLOL TARTRATE 25 MG/1
12.5 TABLET, FILM COATED ORAL EVERY 12 HOURS
Qty: 90 TABLET | Refills: 3 | Status: SHIPPED | OUTPATIENT
Start: 2024-09-18 | End: 2025-09-18

## 2024-09-18 RX ORDER — LOSARTAN POTASSIUM 25 MG/1
25 TABLET ORAL DAILY
Qty: 90 TABLET | Refills: 3 | Status: SHIPPED | OUTPATIENT
Start: 2024-09-18 | End: 2025-09-18

## 2024-09-18 NOTE — TELEPHONE ENCOUNTER
Patient called for refill on 3 medications:     Metoprolol tartrate 25 mg .5 tablets twice a day  Pantoprazole 40 mg 1 x day  Losartan 25 mg 1 x day    She would like it to go to [facility] in [facility].    Thank you.

## 2024-09-30 ENCOUNTER — ANTICOAGULATION - WARFARIN VISIT (OUTPATIENT)
Dept: CARDIOLOGY | Facility: CLINIC | Age: 74
End: 2024-09-30
Payer: MEDICARE

## 2024-09-30 DIAGNOSIS — I48.11 LONGSTANDING PERSISTENT ATRIAL FIBRILLATION (MULTI): ICD-10-CM

## 2024-09-30 DIAGNOSIS — Z95.2 S/P AVR (AORTIC VALVE REPLACEMENT): Primary | ICD-10-CM

## 2024-09-30 LAB
INR IN PPP BY COAGULATION ASSAY EXTERNAL: 2.6 (ref 2–3)
PROTHROMBIN TIME (PT) IN PPP BY COAGULATION ASSAY EXTERNAL: NORMAL

## 2024-09-30 NOTE — PROGRESS NOTES
Patient identification verified with 2 identifiers.    Location: [facility] Patient Self-Testing Program [phone]    Referring Physician: DR. NAVEED JAIN  Enrollment/ Re-enrollment date: 2025   INR Goal: 2.0-3.0  INR monitoring is per [facility] protocol.  Anticoagulation Medication: warfarin  Indication: Aortic Valve Replacement    Subjective   Bleeding signs/symptoms: No    Bruising: No   Major bleeding event: No  Thrombosis signs/symptoms: No  Thromboembolic event: No  Missed doses: No  Extra doses: No  Medication changes: No  Dietary changes: No  Change in health: No  Change in activity: No  Alcohol: No  Other concerns: No    Upcoming Procedures:  Does the Patient Have any upcoming procedures that require interruption in anticoagulation therapy? no  Does the patient require bridging? no      Anticoagulation Summary  As of 2024      INR goal:  2.0-3.0   TTR:  72.8% (1 y)   INR used for dosin.60 (2024)   Weekly warfarin total:  45 mg               Assessment/Plan   Therapeutic     1. New dose: no change  SPOKE TO PT. CONFIRMED CURRENT DOSING INSTRUCTIONS  2. Next INR: 2 weeks      Education provided to patient during the visit:  Patient instructed to call in interim with questions, concerns and changes.   Patient educated on compliance with dosing, follow up appointments, and prescribed plan of care.

## 2024-10-14 ENCOUNTER — ANTICOAGULATION - WARFARIN VISIT (OUTPATIENT)
Dept: CARDIOLOGY | Facility: CLINIC | Age: 74
End: 2024-10-14
Payer: MEDICARE

## 2024-10-14 DIAGNOSIS — Z95.2 S/P AVR (AORTIC VALVE REPLACEMENT): Primary | ICD-10-CM

## 2024-10-14 DIAGNOSIS — I48.11 LONGSTANDING PERSISTENT ATRIAL FIBRILLATION (MULTI): ICD-10-CM

## 2024-10-14 LAB
INR IN PPP BY COAGULATION ASSAY EXTERNAL: 2.7
INR IN PPP BY COAGULATION ASSAY EXTERNAL: 2.7
PROTHROMBIN TIME (PT) IN PPP BY COAGULATION ASSAY EXTERNAL: NORMAL
PROTHROMBIN TIME (PT) IN PPP BY COAGULATION ASSAY EXTERNAL: NORMAL

## 2024-10-14 NOTE — PROGRESS NOTES
Patient identification verified with 2 identifiers.    Location: [facility] Patient Self-Testing Program [phone]    Referring Physician: DR. NAVEED JAIN  Enrollment/ Re-enrollment date: 2025   INR Goal: 2.0-3.0  INR monitoring is per [facility] protocol.  Anticoagulation Medication: warfarin  Indication: Aortic Valve Replacement    Subjective   Bleeding signs/symptoms: No    Bruising: No   Major bleeding event: No  Thrombosis signs/symptoms: No  Thromboembolic event: No  Missed doses: No  Extra doses: No  Medication changes: No  Dietary changes: No  Change in health: No  Change in activity: No  Alcohol: No  Other concerns: No    Upcoming Procedures:  Does the Patient Have any upcoming procedures that require interruption in anticoagulation therapy? no  Does the patient require bridging? no      Anticoagulation Summary  As of 10/14/2024      INR goal:  2.0-3.0   TTR:  73.8% (1 y)   INR used for dosin.70 (10/14/2024)   Weekly warfarin total:  45 mg               Assessment/Plan   Therapeutic     1. New dose: no change  SPOKE TO PT. CONFIRMED CURRENT DOSING INSTRUCTIONS. PT VERBALIZED CORRECTLY  2. Next INR: 2 weeks      Education provided to patient during the visit:  Patient instructed to call in interim with questions, concerns and changes.   Patient educated on compliance with dosing, follow up appointments, and prescribed plan of care.

## 2024-10-31 ENCOUNTER — APPOINTMENT (OUTPATIENT)
Dept: CARDIOLOGY | Facility: CLINIC | Age: 74
End: 2024-10-31
Payer: MEDICARE

## 2024-10-31 ENCOUNTER — ANTICOAGULATION - WARFARIN VISIT (OUTPATIENT)
Dept: CARDIOLOGY | Facility: CLINIC | Age: 74
End: 2024-10-31
Payer: MEDICARE

## 2024-10-31 DIAGNOSIS — Z95.2 S/P AVR (AORTIC VALVE REPLACEMENT): Primary | ICD-10-CM

## 2024-10-31 DIAGNOSIS — I48.11 LONGSTANDING PERSISTENT ATRIAL FIBRILLATION (MULTI): ICD-10-CM

## 2024-10-31 LAB
INR IN PPP BY COAGULATION ASSAY EXTERNAL: 2.4
PROTHROMBIN TIME (PT) IN PPP BY COAGULATION ASSAY EXTERNAL: NORMAL

## 2024-11-18 ENCOUNTER — ANTICOAGULATION - WARFARIN VISIT (OUTPATIENT)
Dept: CARDIOLOGY | Facility: CLINIC | Age: 74
End: 2024-11-18
Payer: MEDICARE

## 2024-11-18 DIAGNOSIS — Z95.2 S/P AVR (AORTIC VALVE REPLACEMENT): Primary | ICD-10-CM

## 2024-11-18 DIAGNOSIS — I48.11 LONGSTANDING PERSISTENT ATRIAL FIBRILLATION (MULTI): ICD-10-CM

## 2024-11-18 LAB
INR IN PPP BY COAGULATION ASSAY EXTERNAL: 2.5
PROTHROMBIN TIME (PT) IN PPP BY COAGULATION ASSAY EXTERNAL: NORMAL

## 2024-11-18 NOTE — PROGRESS NOTES
Patient identification verified with 2 identifiers.    Location: [facility] Patient Self-Testing Program [phone]    Referring Physician: DR. NAVEED JAIN  Enrollment/ Re-enrollment date: 2025   INR Goal: 2.0-3.0  INR monitoring is per [facility] protocol.  Anticoagulation Medication: warfarin  Indication: Aortic Valve Replacement    Subjective   Bleeding signs/symptoms: No    Bruising: No   Major bleeding event: No  Thrombosis signs/symptoms: No  Thromboembolic event: No  Missed doses: No  Extra doses: No  Medication changes: No  Dietary changes: No  Change in health: No  Change in activity: No  Alcohol: No  Other concerns: No    Upcoming Procedures:  Does the Patient Have any upcoming procedures that require interruption in anticoagulation therapy? no  Does the patient require bridging? no      Anticoagulation Summary  As of 2024      INR goal:  2.0-3.0   TTR:  76.0% (1.1 y)   INR used for dosin.50 (2024)   Weekly warfarin total:  45 mg               Assessment/Plan   Therapeutic     1. New dose: no change SPOKE TO PT. CONFIRMED CURRENT DOSING INSTRUCTIONS   2. Next INR: 2 weeks      Education provided to patient during the visit:  Patient instructed to call in interim with questions, concerns and changes.   Patient educated on compliance with dosing, follow up appointments, and prescribed plan of care.

## 2024-12-02 ENCOUNTER — ANTICOAGULATION - WARFARIN VISIT (OUTPATIENT)
Dept: CARDIOLOGY | Facility: CLINIC | Age: 74
End: 2024-12-02
Payer: MEDICARE

## 2024-12-02 DIAGNOSIS — Z95.2 S/P AVR (AORTIC VALVE REPLACEMENT): Primary | ICD-10-CM

## 2024-12-02 DIAGNOSIS — I48.11 LONGSTANDING PERSISTENT ATRIAL FIBRILLATION (MULTI): ICD-10-CM

## 2024-12-02 LAB
INR IN PPP BY COAGULATION ASSAY EXTERNAL: 3.1
PROTHROMBIN TIME (PT) IN PPP BY COAGULATION ASSAY EXTERNAL: NORMAL

## 2024-12-02 NOTE — PROGRESS NOTES
Patient identification verified with 2 identifiers.    Location: [facility] Patient Self-Testing Program [phone]    Referring Physician: NAVEED JAIN  Enrollment/ Re-enrollment date: 8/26/2025   INR Goal: 2.0-3.0  INR monitoring is per [facility] protocol.  Anticoagulation Medication: warfarin  Indication: Atrial Fibrillation/Atrial Flutter and Aortic Valve Replacement    Subjective   Bleeding signs/symptoms: No    Bruising: No   Major bleeding event: No  Thrombosis signs/symptoms: No  Thromboembolic event: No  Missed doses: No  Extra doses: No  Medication changes: No  Dietary changes: No  Change in health: No  Change in activity: No  Alcohol: No  Other concerns: No    Upcoming Procedures:  Does the Patient Have any upcoming procedures that require interruption in anticoagulation therapy? no  Does the patient require bridging? no      Anticoagulation Summary  As of 12/2/2024      INR goal:  2.0-3.0   TTR:  76.2% (1.2 y)   INR used for dosing:  3.10 (12/2/2024)   Weekly warfarin total:  45 mg               Assessment/Plan   Supratherapeutic     1. New dose: no change  AT THIS TIME  2. Next INR: 1 week      Education provided to patient during the visit:  Patient instructed to call in interim with questions, concerns and changes.   Patient educated on interactions between medications and warfarin.   Patient educated on dietary consistency in vitamin k consumption.   Patient educated on affects of alcohol consumption while taking warfarin.   Patient educated on signs of bleeding/clotting.   Patient educated on compliance with dosing, follow up appointments, and prescribed plan of care.

## 2024-12-09 ENCOUNTER — ANTICOAGULATION - WARFARIN VISIT (OUTPATIENT)
Dept: CARDIOLOGY | Facility: CLINIC | Age: 74
End: 2024-12-09
Payer: MEDICARE

## 2024-12-09 DIAGNOSIS — I48.11 LONGSTANDING PERSISTENT ATRIAL FIBRILLATION (MULTI): ICD-10-CM

## 2024-12-09 DIAGNOSIS — Z95.2 S/P AVR (AORTIC VALVE REPLACEMENT): Primary | ICD-10-CM

## 2024-12-09 LAB
INR IN PPP BY COAGULATION ASSAY EXTERNAL: 3.4
PROTHROMBIN TIME (PT) IN PPP BY COAGULATION ASSAY EXTERNAL: NORMAL

## 2024-12-16 ENCOUNTER — ANTICOAGULATION - WARFARIN VISIT (OUTPATIENT)
Dept: CARDIOLOGY | Facility: CLINIC | Age: 74
End: 2024-12-16
Payer: MEDICARE

## 2024-12-16 DIAGNOSIS — Z95.2 S/P AVR (AORTIC VALVE REPLACEMENT): Primary | ICD-10-CM

## 2024-12-16 DIAGNOSIS — I48.11 LONGSTANDING PERSISTENT ATRIAL FIBRILLATION (MULTI): ICD-10-CM

## 2024-12-16 NOTE — PROGRESS NOTES
Patient identification verified with 2 identifiers.    Location: [facility] Patient Self-Testing Program [phone]    Referring Physician: Silas Lawson MD   Enrollment/ Re-enrollment date: 2025   INR Goal: 2.0-3.0  INR monitoring is per [facility] protocol.  Anticoagulation Medication: warfarin  Indication: Atrial Fibrillation/Atrial Flutter and Aortic Valve Replacement    Subjective   Bleeding signs/symptoms: No    Bruising: No   Major bleeding event: No  Thrombosis signs/symptoms: No  Thromboembolic event: No  Missed doses: No  Extra doses: No  Medication changes: No  Dietary changes: No  Change in health: No  Change in activity: No  Alcohol: No  Other concerns: No    Upcoming Procedures:  Does the Patient Have any upcoming procedures that require interruption in anticoagulation therapy? no  Does the patient require bridging? no      Anticoagulation Summary  As of 2024      INR goal:  2.0-3.0   TTR:  74.8% (1.2 y)   INR used for dosin.40 (2024)   Weekly warfarin total:  42.5 mg               Assessment/Plan   Therapeutic     1. New dose: no change    2. Next INR: 1 week      Education provided to patient during the visit:  Patient instructed to call in interim with questions, concerns and changes.

## 2024-12-17 ENCOUNTER — APPOINTMENT (OUTPATIENT)
Dept: PRIMARY CARE | Facility: CLINIC | Age: 74
End: 2024-12-17
Payer: MEDICARE

## 2024-12-17 VITALS — SYSTOLIC BLOOD PRESSURE: 128 MMHG | DIASTOLIC BLOOD PRESSURE: 72 MMHG

## 2024-12-17 DIAGNOSIS — I48.91 ATRIAL FIBRILLATION, UNSPECIFIED TYPE (MULTI): ICD-10-CM

## 2024-12-17 DIAGNOSIS — E11.8 CONTROLLED TYPE 2 DIABETES MELLITUS WITH COMPLICATION, WITHOUT LONG-TERM CURRENT USE OF INSULIN (MULTI): ICD-10-CM

## 2024-12-17 DIAGNOSIS — E21.3 HYPERPARATHYROIDISM (MULTI): ICD-10-CM

## 2024-12-17 DIAGNOSIS — F41.9 ANXIETY: Primary | ICD-10-CM

## 2024-12-17 DIAGNOSIS — F32.A DEPRESSION, UNSPECIFIED DEPRESSION TYPE: ICD-10-CM

## 2024-12-17 DIAGNOSIS — I10 BENIGN ESSENTIAL HYPERTENSION: ICD-10-CM

## 2024-12-17 PROCEDURE — 3074F SYST BP LT 130 MM HG: CPT | Performed by: STUDENT IN AN ORGANIZED HEALTH CARE EDUCATION/TRAINING PROGRAM

## 2024-12-17 PROCEDURE — 3078F DIAST BP <80 MM HG: CPT | Performed by: STUDENT IN AN ORGANIZED HEALTH CARE EDUCATION/TRAINING PROGRAM

## 2024-12-17 PROCEDURE — 1123F ACP DISCUSS/DSCN MKR DOCD: CPT | Performed by: STUDENT IN AN ORGANIZED HEALTH CARE EDUCATION/TRAINING PROGRAM

## 2024-12-17 PROCEDURE — 3048F LDL-C <100 MG/DL: CPT | Performed by: STUDENT IN AN ORGANIZED HEALTH CARE EDUCATION/TRAINING PROGRAM

## 2024-12-17 PROCEDURE — 4010F ACE/ARB THERAPY RXD/TAKEN: CPT | Performed by: STUDENT IN AN ORGANIZED HEALTH CARE EDUCATION/TRAINING PROGRAM

## 2024-12-17 PROCEDURE — 3044F HG A1C LEVEL LT 7.0%: CPT | Performed by: STUDENT IN AN ORGANIZED HEALTH CARE EDUCATION/TRAINING PROGRAM

## 2024-12-17 PROCEDURE — G2211 COMPLEX E/M VISIT ADD ON: HCPCS | Performed by: STUDENT IN AN ORGANIZED HEALTH CARE EDUCATION/TRAINING PROGRAM

## 2024-12-17 PROCEDURE — 99214 OFFICE O/P EST MOD 30 MIN: CPT | Performed by: STUDENT IN AN ORGANIZED HEALTH CARE EDUCATION/TRAINING PROGRAM

## 2024-12-17 ASSESSMENT — PATIENT HEALTH QUESTIONNAIRE - PHQ9
SUM OF ALL RESPONSES TO PHQ9 QUESTIONS 1 AND 2: 0
2. FEELING DOWN, DEPRESSED OR HOPELESS: NOT AT ALL
1. LITTLE INTEREST OR PLEASURE IN DOING THINGS: NOT AT ALL

## 2024-12-17 NOTE — PROGRESS NOTES
MRN:  [id number]     Subjective   Patient ID: Mario Alberto Pena is a 74 y.o. female who presents for No chief complaint on file..  HPI    Pt is presenting for follow up visit. No complaints at this time. Does note she is under some stress because she is taking care of her  who has terminal cancer. He is in a nursing home currently.    Review of Systems     10 point ROS negative aside from HPI     Objective     Physical Exam    Physical Exam:  General:  Pleasant and cooperative. No apparent distress.  HEENT:  Normocephalic, atraumatic, mucus membranes moist.   Neck:  Trachea midline.  No JVD.    Chest:  Clear to auscultation bilaterally. No wheezes, rales, or rhonchi.  CV:  Regular rate and rhythm.  Positive S1/S2.   Abdomen: Bowel sounds present in all four quadrants, abdomen is soft, non-tender, non-distended.  Extremities:  No lower extremity edema or cyanosis.   Neurological:  AAOx3. No focal deficits.  Skin:  Warm and dry.      Subjective     There were no vitals taken for this visit.     Assessment/Plan       #Anxiety and depression: continue escitalopram. Offered emotional support.     #Hypertension: continue amlodipine 10mg daily, losartan     #hypercalcemia/primary hyperparathyroidism: seeing endocrinology, endocrine surgery evaluated the patient, no indication for surgical intervention at this time, per their report. Advised to continue off Calcium/vitamin D supplementation and follow up with Endocrinology. Pt to also follow up with genetics.     #Prosthetic valve endocarditis:   #paroxysmal AFib  -s/p surgical repeat AVR (2020). Follows with Cardiology.   -Continue warfarin. Interval specialist notes reviewed. Checking her INR at home  -gets prophylaxis for dental procedures      #CAD s/p NSTEMI: stent placed to LAD 2019. Follows with Cardiology     #DM2: Discussed lifestyle modifications. Continue diet and exercise and weight loss. Seeing endocrinology. Repeat A1c at next endocrinology follow up      #h/o Stroke: Continue statin. Continue warfarin. Follows with Neurology.      #HLD: Currently on atorvastatin. Continue fish oil.     #Health maintenance:   - Mammogram done 6/2024  - Colonoscopy and EGD 11/2019, repeat recommended for 2024, she declines for now pending 's medical issues.   - Advised Shingrix.   - Advised yearly flu shot  - Advised GYN eval.   - Seeing ophthalmology and dentist annually   - advised to get Prevnar 20.  - Received COVID vaccines, booster  -Yearly flu shots advised.  - TDaP given in 2023   - Longitudinal care.     RTC 3-4 mo          Dr. Bj Hendrickson   Internal Medicine PGY-3    Trainee role: Resident    I saw and evaluated the patient. I personally obtained the key and critical portions of the history and physical exam or was physically present for key and critical portions performed by the trainee. I reviewed the trainee's documentation and discussed the patient with the trainee. I agree with the trainee's medical decision making as documented on the trainee's notes.    Jigar Hurst M.D.

## 2024-12-19 ENCOUNTER — LAB (OUTPATIENT)
Dept: LAB | Facility: LAB | Age: 74
End: 2024-12-19
Payer: MEDICARE

## 2024-12-19 ENCOUNTER — OFFICE VISIT (OUTPATIENT)
Dept: CARDIOLOGY | Facility: HOSPITAL | Age: 74
End: 2024-12-19
Payer: MEDICARE

## 2024-12-19 VITALS
HEIGHT: 66 IN | HEART RATE: 70 BPM | OXYGEN SATURATION: 93 % | BODY MASS INDEX: 30.65 KG/M2 | DIASTOLIC BLOOD PRESSURE: 79 MMHG | WEIGHT: 190.7 LBS | SYSTOLIC BLOOD PRESSURE: 117 MMHG

## 2024-12-19 DIAGNOSIS — Z95.2 S/P AVR (AORTIC VALVE REPLACEMENT): ICD-10-CM

## 2024-12-19 DIAGNOSIS — I25.10 CORONARY ARTERY DISEASE INVOLVING NATIVE HEART WITHOUT ANGINA PECTORIS, UNSPECIFIED VESSEL OR LESION TYPE: ICD-10-CM

## 2024-12-19 DIAGNOSIS — Z95.3 H/O HEART VALVE REPLACEMENT WITH BIOPROSTHETIC VALVE: ICD-10-CM

## 2024-12-19 DIAGNOSIS — E66.9 OBESITY (BMI 30-39.9): ICD-10-CM

## 2024-12-19 DIAGNOSIS — I48.0 PAROXYSMAL ATRIAL FIBRILLATION (MULTI): ICD-10-CM

## 2024-12-19 DIAGNOSIS — I10 BENIGN ESSENTIAL HYPERTENSION: ICD-10-CM

## 2024-12-19 DIAGNOSIS — I48.0 PAROXYSMAL ATRIAL FIBRILLATION (MULTI): Primary | ICD-10-CM

## 2024-12-19 DIAGNOSIS — E78.00 PURE HYPERCHOLESTEROLEMIA: ICD-10-CM

## 2024-12-19 LAB
ANION GAP SERPL CALC-SCNC: 10 MMOL/L (ref 10–20)
ATRIAL RATE: 70 BPM
BUN SERPL-MCNC: 14 MG/DL (ref 6–23)
CALCIUM SERPL-MCNC: 10.6 MG/DL (ref 8.6–10.3)
CHLORIDE SERPL-SCNC: 108 MMOL/L (ref 98–107)
CO2 SERPL-SCNC: 27 MMOL/L (ref 21–32)
CREAT SERPL-MCNC: 1.02 MG/DL (ref 0.5–1.05)
EGFRCR SERPLBLD CKD-EPI 2021: 57 ML/MIN/1.73M*2
ERYTHROCYTE [DISTWIDTH] IN BLOOD BY AUTOMATED COUNT: 13.5 % (ref 11.5–14.5)
GLUCOSE SERPL-MCNC: 126 MG/DL (ref 74–99)
HCT VFR BLD AUTO: 41.5 % (ref 36–46)
HGB BLD-MCNC: 13.8 G/DL (ref 12–16)
MCH RBC QN AUTO: 29.9 PG (ref 26–34)
MCHC RBC AUTO-ENTMCNC: 33.3 G/DL (ref 32–36)
MCV RBC AUTO: 90 FL (ref 80–100)
NRBC BLD-RTO: 0 /100 WBCS (ref 0–0)
P AXIS: 48 DEGREES
P OFFSET: 195 MS
P ONSET: 144 MS
PLATELET # BLD AUTO: 220 X10*3/UL (ref 150–450)
POTASSIUM SERPL-SCNC: 4.5 MMOL/L (ref 3.5–5.3)
PR INTERVAL: 146 MS
Q ONSET: 217 MS
QRS COUNT: 11 BEATS
QRS DURATION: 94 MS
QT INTERVAL: 400 MS
QTC CALCULATION(BAZETT): 432 MS
QTC FREDERICIA: 421 MS
R AXIS: 77 DEGREES
RBC # BLD AUTO: 4.62 X10*6/UL (ref 4–5.2)
SODIUM SERPL-SCNC: 140 MMOL/L (ref 136–145)
T AXIS: 119 DEGREES
T OFFSET: 417 MS
TSH SERPL-ACNC: 2.08 MIU/L (ref 0.44–3.98)
VENTRICULAR RATE: 70 BPM
WBC # BLD AUTO: 7.6 X10*3/UL (ref 4.4–11.3)

## 2024-12-19 PROCEDURE — 3074F SYST BP LT 130 MM HG: CPT | Performed by: NURSE PRACTITIONER

## 2024-12-19 PROCEDURE — 1159F MED LIST DOCD IN RCRD: CPT | Performed by: NURSE PRACTITIONER

## 2024-12-19 PROCEDURE — 80048 BASIC METABOLIC PNL TOTAL CA: CPT

## 2024-12-19 PROCEDURE — 3044F HG A1C LEVEL LT 7.0%: CPT | Performed by: NURSE PRACTITIONER

## 2024-12-19 PROCEDURE — 93005 ELECTROCARDIOGRAM TRACING: CPT | Performed by: NURSE PRACTITIONER

## 2024-12-19 PROCEDURE — 84443 ASSAY THYROID STIM HORMONE: CPT

## 2024-12-19 PROCEDURE — 3078F DIAST BP <80 MM HG: CPT | Performed by: NURSE PRACTITIONER

## 2024-12-19 PROCEDURE — 99214 OFFICE O/P EST MOD 30 MIN: CPT | Performed by: NURSE PRACTITIONER

## 2024-12-19 PROCEDURE — 3048F LDL-C <100 MG/DL: CPT | Performed by: NURSE PRACTITIONER

## 2024-12-19 PROCEDURE — 1036F TOBACCO NON-USER: CPT | Performed by: NURSE PRACTITIONER

## 2024-12-19 PROCEDURE — 36415 COLL VENOUS BLD VENIPUNCTURE: CPT

## 2024-12-19 PROCEDURE — 1123F ACP DISCUSS/DSCN MKR DOCD: CPT | Performed by: NURSE PRACTITIONER

## 2024-12-19 PROCEDURE — 85027 COMPLETE CBC AUTOMATED: CPT

## 2024-12-19 PROCEDURE — 4010F ACE/ARB THERAPY RXD/TAKEN: CPT | Performed by: NURSE PRACTITIONER

## 2024-12-19 PROCEDURE — G2211 COMPLEX E/M VISIT ADD ON: HCPCS | Performed by: NURSE PRACTITIONER

## 2024-12-19 NOTE — PATIENT INSTRUCTIONS
Continue current meds  Follow up 6 months  Check blood work CBC, BMP and TSH  Referral to clinical pharmacy for possible GLP1 agonist Wegovy?  Continue physical activity

## 2024-12-19 NOTE — PROGRESS NOTES
Primary Care Physician: Jigar Hurst MD  Date of Visit: 12/19/2024  1:00 PM EST  Location of visit: [facility]     Chief Complaint:   Chief Complaint   Patient presents with    Follow-up        HPI / Summary:   Mario Alberto Pena is a 74 y.o. female presents for followup. Seen in collaboration with Dr. Lawson. She is overall feeling well. She is accompanied by her son. She has been walking the stores without chest pain or dyspnea. She has mild dyspnea on exertion walking for a prolonged period of time that has been ongoing since her valve surgery and unchanged. Her cough has improved since taking Losartan. Denies chest pain, dyspnea, orthopnea, pnd, lightheadedness, dizziness, syncope, palpitations, lower extremity edema, or bleeding issues.     She had never started Ezetimibe from a year ago.            Past Medical History:  Past Medical History:   Diagnosis Date    Allergic contact dermatitis due to plants, except food 08/24/2018    Poison ivy    Cerebrovascular disease, unspecified 03/13/2015    Ill-defined cerebrovascular disease    Encounter for follow-up examination after completed treatment for conditions other than malignant neoplasm 11/25/2019    Hospital discharge follow-up    Encounter for screening mammogram for malignant neoplasm of breast 09/21/2016    Encounter for mammogram to establish baseline mammogram    Hyperlipidemia, unspecified 03/13/2015    Dyslipidemia    Low back pain, unspecified 10/09/2019    Acute exacerbation of chronic low back pain    Low back pain, unspecified 10/09/2019    Acute exacerbation of chronic low back pain    Low back pain, unspecified 07/25/2017    Acute exacerbation of chronic low back pain    Other specified postprocedural states     H/O colonoscopy    Personal history of other diseases of the musculoskeletal system and connective tissue 04/05/2018    History of muscle spasm    Personal history of other diseases of urinary system 03/15/2018    History of  hematuria    Personal history of other medical treatment     H/O bone density study    Personal history of other medical treatment     History of mammogram    Personal history of other specified conditions 05/02/2020    History of nasal congestion    Personal history of other specified conditions 11/25/2019    History of bacteremia    Rash and other nonspecific skin eruption 03/28/2016    Skin rash    Strain of muscle, fascia and tendon of lower back, initial encounter 04/06/2016    Lumbar strain    Urinary tract infection, site not specified 04/05/2018    Acute UTI    Vitamin D deficiency, unspecified 02/08/2016    Vitamin D deficiency        Past Surgical History:  Past Surgical History:   Procedure Laterality Date    CT ANGIO NECK  8/28/2020    CT NECK ANGIO W AND WO IV CONTRAST 8/28/2020 [facility]    HYSTERECTOMY  03/13/2015    Hysterectomy    OTHER SURGICAL HISTORY  01/22/2018    Aortic Valve Repair          Social History:   reports that she has quit smoking. Her smoking use included cigarettes. She has never used smokeless tobacco. She reports that she does not drink alcohol and does not use drugs.     Family History:  family history includes Alzheimer's disease in her mother; Colon cancer in her mother.      Allergies:  Allergies   Allergen Reactions    Heparin Other     Low Platelet Count    Lisinopril Cough    Penicillins Unknown       Outpatient Medications:  Current Outpatient Medications   Medication Instructions    acetaminophen (Tylenol Extra Strength) 500 mg tablet 2 tablets, Every 6 hours PRN    albuterol 90 mcg/actuation inhaler 2 puffs, Every 4 hours PRN    amLODIPine (NORVASC) 10 mg, oral, Daily    aspirin (Aspirin Childrens) 81 mg chewable tablet 1 tablet, Daily    clindamycin (Cleocin) 300 mg capsule 2 capsules, Once    docusate sodium (Colace) 100 mg capsule 1 capsule, 2 times daily PRN    escitalopram (LEXAPRO) 20 mg, oral, Daily    ezetimibe (ZETIA) 10 mg, oral, Daily    folic  "acid (Folvite) 1 mg tablet 1 tablet, Daily    glucos sul 2KCl/msm/chond/C/Mn (GLUCOSAMINE CHONDROITIN ORAL) 1 tablet, Daily    ipratropium-albuteroL (Duo-Neb) 0.5-2.5 mg/3 mL nebulizer solution 3 mL, Every 2 hour PRN    lidocaine (LMX) 4 % cream 4 times daily PRN    losartan (COZAAR) 25 mg, oral, Daily    metoprolol tartrate (LOPRESSOR) 12.5 mg, oral, Every 12 hours    multivitamin-min-iron-FA-vit K (Adults Multivitamin) 18 mg iron-400 mcg-25 mcg tablet 1 tablet, Daily    oxybutynin XL (DITROPAN-XL) 5 mg, oral, Daily    oxygen (O2) therapy 3 L, Continuous    pantoprazole (PROTONIX) 40 mg, oral, Daily before breakfast    rosuvastatin (CRESTOR) 40 mg, oral, Nightly    warfarin (Coumadin) 10 mg tablet 1 tablet, Daily    warfarin (Coumadin) 2.5 mg tablet 1 tab qd    warfarin (COUMADIN) 5 mg, oral, Nightly, Daily as directed       Physical Exam:  Vitals:    12/19/24 1248   BP: 117/79   BP Location: Right arm   Pulse: 70   SpO2: 93%   Weight: 86.5 kg (190 lb 11.2 oz)   Height: 1.676 m (5' 6\")     Wt Readings from Last 5 Encounters:   12/19/24 86.5 kg (190 lb 11.2 oz)   09/17/24 84.8 kg (187 lb)   06/26/24 83.4 kg (183 lb 12.8 oz)   02/29/24 82.6 kg (182 lb)   10/30/23 82.1 kg (181 lb)     Body mass index is 30.78 kg/m².     GENERAL: alert, cooperative, pleasant, in no acute distress  SKIN: warm and dry  NECK: Normal JVD, negative HJR  CARDIAC: Regular rate and rhythm with 1/6 early peaking systolic murmur, no rubs or gallops  CHEST: Normal respiratory efforts, lungs clear to auscultation bilaterally.  ABDOMEN: soft, nontender, nondistended  EXTREMITIES: no edema, +2 palpable RP bilaterally and PT pulses bilaterally       Last Labs:  Recent Labs     07/26/24  1205 10/18/23  1229 04/20/23  1038   WBC 6.8 8.9 7.3   HGB 13.4 13.6 13.1   HCT 41.4 42.7 40.0    223 202   MCV 92 93 92     Recent Labs     07/26/24  1205 10/18/23  1229 07/12/23  1010 05/23/23  1022    139  --  140   K 4.8 4.2  --  5.1    105  --  " 107   BUN 11 20 -- 22   CREATININE 0.96 1.02 1.12* 0.95     CMP -  Lab Results   Component Value Date    CALCIUM 11.0 (H) 07/26/2024    PHOS 2.9 07/12/2023    PROT 7.0 07/26/2024    ALBUMIN 4.7 07/26/2024    AST 21 07/26/2024    ALT 25 07/26/2024    ALKPHOS 83 07/26/2024    BILITOT 0.6 07/26/2024       LIPID PANEL -   Lab Results   Component Value Date    CHOL 150 03/27/2024    HDL 64.4 03/27/2024    LDLF 59 12/29/2022    TRIG 141 03/27/2024   57 3/27/24    Lab Results   Component Value Date     (H) 07/12/2022    HGBA1C 6.1 (H) 02/29/2024       Last Cardiology Tests:  ECG:  Obtained and reviewed EKG- normal sinus rhythm HR 70 non specific T wave abnormality    Echo:  7/13/22  Echo Results:  CONCLUSIONS:   1. The left ventricular systolic function is normal with a 60-65% estimated ejection fraction.   2. Poorly visualized anatomical structures due to suboptimal image quality.   3. Mildly elevated RVSP.   4. There is a stentless aortic valve bioprosthesis.   5. No valvular vegetations seen on this limited study. Consider SYDNI if felt clinically indicated.        Cath:  11/20/2019  CONCLUSIONS:   1. Left main: no significant angiographic disease.   2. LAD: 100% mid-vessel occlusion, faint distal filling from right to left collaterals.   3. LCx: no significant angiographic disease.   4. RCA: no significant angiographic disease.   5. LVEDP 3mmHg.   6. Successful PCI to 100% mid-LAD lesion with a 2.25 x 15mm Resolute MARILEE post-dilated distally with a 2.25mm NC balloon at 24atm and proximally with a 2.5mm NC balloon at 24atm.       Stress Test:  Stress Results:  No results found for this or any previous visit from the past 365 days.             Assessment/Plan   Mario Alberto was seen today for follow-up.  Diagnoses and all orders for this visit:  Paroxysmal atrial fibrillation (Multi) (Primary)  -     ECG 12 lead (Clinic Performed)  -     CBC; Future  -     Basic metabolic panel; Future  -     TSH with reflex to Free T4  if abnormal; Future  S/P AVR (aortic valve replacement)  Benign essential hypertension  H/O heart valve replacement with bioprosthetic valve  Pure hypercholesterolemia  Coronary artery disease involving native heart without angina pectoris, unspecified vessel or lesion type  -     Referral to Clinical Pharmacy; Future  Obesity (BMI 30-39.9)  -     TSH with reflex to Free T4 if abnormal; Future  -     Referral to Clinical Pharmacy; Future        In summary Ms. Pena is a pleasant 74 year-old white female with a past medical history significant for severe aortic stenosis with probable bicuspid valve status post AVR with subsequent prosthetic valve endocarditis and redo AVR with reconstruction of the LVOT with mildly reduced LV function, hypertension, hyperlipidemia, and prior remote cryptogenic stroke on chronic anticoagulation with subsequent likely embolic events, and nonobstructive carotid atherosclerosis, postoperative atrial fibrillation, coronary artery disease status post PCI of her mid LAD in the setting of a possible embolic NSTEMI, and possible HIT. She is asymptomatic from a cardiac perspective. Her blood pressure is controlled. Recent lipid panel is acceptable. I have ordered blood work as above. I have placed referral to clinical pharmacy for GLP-1 agonist for weight loss and cardiovascular risk reduction.  She should continue her current cardiovascular medications. I will see her back in follow-up in 6 months.       Followup Appts:  Future Appointments   Date Time Provider Department Center   3/19/2025  1:45 PM Jigar Hurst MD [id number] [place]           ____________________________________________________________  WOO Vazquez-CNP  [facility]  [facility]

## 2024-12-23 ENCOUNTER — ANTICOAGULATION - WARFARIN VISIT (OUTPATIENT)
Dept: CARDIOLOGY | Facility: CLINIC | Age: 74
End: 2024-12-23
Payer: MEDICARE

## 2024-12-23 DIAGNOSIS — I48.11 LONGSTANDING PERSISTENT ATRIAL FIBRILLATION (MULTI): ICD-10-CM

## 2024-12-23 DIAGNOSIS — Z95.2 S/P AVR (AORTIC VALVE REPLACEMENT): Primary | ICD-10-CM

## 2024-12-23 NOTE — PROGRESS NOTES
Patient identification verified with 2 identifiers.    Location: [facility] Patient Self-Testing Program [phone]    Referring Physician: Silas Lawson MD   Enrollment/ Re-enrollment date: 2025   INR Goal: 2.0-3.0  INR monitoring is per [facility] protocol.  Anticoagulation Medication: warfarin  Indication: Atrial Fibrillation/Atrial Flutter and Aortic Valve Replacement      Subjective   Bleeding signs/symptoms: No    Bruising: No   Major bleeding event: No  Thrombosis signs/symptoms: No  Thromboembolic event: No  Missed doses: No  Extra doses: No  Medication changes: No  Dietary changes: No  Change in health: No  Change in activity: No  Alcohol: No  Other concerns: No    Upcoming Procedures:  Does the Patient Have any upcoming procedures that require interruption in anticoagulation therapy? no  Does the patient require bridging? no    Received faxed INR self test result and called patient. Pt identification verified with 2 pt identifiers. Pt denies complications related to ACT therapy. Pt denies changes in diet, medications, social habits or general health. Patient states she has been taking 5 mg every day except 7.5 mg on Mon/Wed/Fri  since dose change instead of 7.5 mg on Sun/Wed/Fri and 5 mg on all other days. Pt instructed to call in interim with questions, concerns and changes. Rearranged weekly dosing as patient has been taking it . Retest in 2 weeks         Anticoagulation Summary  As of 2024      INR goal:  2.0-3.0   TTR:  75.2% (1.2 y)   INR used for dosin.20 (2024)   Weekly warfarin total:  42.5 mg               Assessment/Plan   Therapeutic     1. New dose: no change    2. Next INR: 2 weeks      Education provided to patient during the visit:  Patient instructed to call in interim with questions, concerns and changes.   Patient educated on compliance with dosing, follow up appointments, and prescribed plan of care.

## 2025-01-06 ENCOUNTER — ANTICOAGULATION - WARFARIN VISIT (OUTPATIENT)
Dept: CARDIOLOGY | Facility: CLINIC | Age: 75
End: 2025-01-06
Payer: MEDICARE

## 2025-01-06 DIAGNOSIS — I48.11 LONGSTANDING PERSISTENT ATRIAL FIBRILLATION (MULTI): ICD-10-CM

## 2025-01-06 DIAGNOSIS — Z95.2 S/P AVR (AORTIC VALVE REPLACEMENT): Primary | ICD-10-CM

## 2025-01-06 LAB
INR IN PPP BY COAGULATION ASSAY EXTERNAL: 1.7
PROTHROMBIN TIME (PT) IN PPP BY COAGULATION ASSAY EXTERNAL: NORMAL

## 2025-01-06 NOTE — PROGRESS NOTES
Patient identification verified with 2 identifiers.    Location: [facility] Patient Self-Testing Program [phone]    Referring Physician: DR. NAVEED JAIN  Enrollment/ Re-enrollment date: 2025   INR Goal: 2.0-3.0  INR monitoring is per [facility] protocol.  Anticoagulation Medication: warfarin  Indication: Aortic Valve Replacement    Subjective   Bleeding signs/symptoms: No    Bruising: No   Major bleeding event: No  Thrombosis signs/symptoms: No  Thromboembolic event: No  Missed doses: No  Extra doses: No  Medication changes: No  Dietary changes: No  Change in health: No  Change in activity: No  Alcohol: No  Other concerns: No    Upcoming Procedures:  Does the Patient Have any upcoming procedures that require interruption in anticoagulation therapy? no  Does the patient require bridging? no      Anticoagulation Summary  As of 2025      INR goal:  2.0-3.0   TTR:  74.1% (1.3 y)   INR used for dosin.70 (2025)   Weekly warfarin total:  45 mg               Assessment/Plan   Subtherapeutic     1. New dose:  SPOKE TO PT. WILL INCREASE TWD. PT VERBALIZED NEW DOSING INSTRUCTIONS CORRECTLY     2. Next INR: 1 week      Education provided to patient during the visit:  Patient instructed to call in interim with questions, concerns and changes.   Patient educated on compliance with dosing, follow up appointments, and prescribed plan of care.

## 2025-01-13 ENCOUNTER — ANTICOAGULATION - WARFARIN VISIT (OUTPATIENT)
Dept: CARDIOLOGY | Facility: CLINIC | Age: 75
End: 2025-01-13
Payer: MEDICARE

## 2025-01-13 DIAGNOSIS — I48.11 LONGSTANDING PERSISTENT ATRIAL FIBRILLATION (MULTI): ICD-10-CM

## 2025-01-13 DIAGNOSIS — Z95.2 S/P AVR (AORTIC VALVE REPLACEMENT): Primary | ICD-10-CM

## 2025-01-13 NOTE — PROGRESS NOTES
Patient identification verified with 2 identifiers.    Location: [facility] Patient Self-Testing Program [phone]    Referring Physician: DR. NAVEED JAIN  Enrollment/ Re-enrollment date: 2025   INR Goal: 2.0-3.0  INR monitoring is per [facility] protocol.  Anticoagulation Medication: warfarin  Indication: Aortic Valve Replacement    Subjective   Bleeding signs/symptoms: No    Bruising: No   Major bleeding event: No  Thrombosis signs/symptoms: No  Thromboembolic event: No  Missed doses: No  Extra doses: No  Medication changes: No  Dietary changes: No  Change in health: No  Change in activity: No  Alcohol: No  Other concerns: No    Upcoming Procedures:  Does the Patient Have any upcoming procedures that require interruption in anticoagulation therapy? no  Does the patient require bridging? no      Anticoagulation Summary  As of 2025      INR goal:  2.0-3.0   TTR:  74.1% (1.3 y)   INR used for dosin.70 (2025)   Weekly warfarin total:  45 mg               Assessment/Plan   Therapeutic     1. New dose: no change  Spoke with pt and confirmed dosing schedule.    2. Next INR: 1 week      Education provided to patient during the visit:  Patient instructed to call in interim with questions, concerns and changes.   Patient educated on compliance with dosing, follow up appointments, and prescribed plan of care.

## 2025-01-20 ENCOUNTER — ANTICOAGULATION - WARFARIN VISIT (OUTPATIENT)
Dept: CARDIOLOGY | Facility: CLINIC | Age: 75
End: 2025-01-20
Payer: MEDICARE

## 2025-01-20 DIAGNOSIS — Z95.2 S/P AVR (AORTIC VALVE REPLACEMENT): Primary | ICD-10-CM

## 2025-01-20 DIAGNOSIS — I48.11 LONGSTANDING PERSISTENT ATRIAL FIBRILLATION (MULTI): ICD-10-CM

## 2025-01-20 NOTE — PROGRESS NOTES
Patient identification verified with 2 identifiers.    Location: [facility] Patient Self-Testing Program [phone]    Referring Physician: DR. NAVEED JAIN  Enrollment/ Re-enrollment date: 2025   INR Goal: 2.0-3.0  INR monitoring is per [facility] protocol.  Anticoagulation Medication: warfarin  Indication: Aortic Valve Replacement    Subjective   Bleeding signs/symptoms: No    Bruising: No   Major bleeding event: No  Thrombosis signs/symptoms: No  Thromboembolic event: No  Missed doses: No  Extra doses: No  Medication changes: No  Dietary changes: No  Change in health: No  Change in activity: No  Alcohol: No  Other concerns: No    Upcoming Procedures:  Does the Patient Have any upcoming procedures that require interruption in anticoagulation therapy? no  Does the patient require bridging? no      Anticoagulation Summary  As of 2025      INR goal:  2.0-3.0   TTR:  74.4% (1.3 y)   INR used for dosin.80 (2025)   Weekly warfarin total:  45 mg               Assessment/Plan   Therapeutic     1. New dose: Spoke to patient with dosing instructions and next testing date.    2. Next INR: 2 weeks      Education provided to patient during the visit:  Patient instructed to call in interim with questions, concerns and changes.

## 2025-01-21 ENCOUNTER — APPOINTMENT (OUTPATIENT)
Dept: PHARMACY | Facility: HOSPITAL | Age: 75
End: 2025-01-21
Payer: MEDICARE

## 2025-01-21 DIAGNOSIS — I25.10 CORONARY ARTERY DISEASE INVOLVING NATIVE HEART WITHOUT ANGINA PECTORIS, UNSPECIFIED VESSEL OR LESION TYPE: ICD-10-CM

## 2025-01-21 DIAGNOSIS — E66.9 OBESITY (BMI 30-39.9): ICD-10-CM

## 2025-01-21 RX ORDER — SEMAGLUTIDE 0.25 MG/.5ML
0.25 INJECTION, SOLUTION SUBCUTANEOUS WEEKLY
Qty: 2 ML | Refills: 1 | Status: SHIPPED | OUTPATIENT
Start: 2025-01-21 | End: 2025-01-21 | Stop reason: SDUPTHER

## 2025-01-21 RX ORDER — SEMAGLUTIDE 0.25 MG/.5ML
0.25 INJECTION, SOLUTION SUBCUTANEOUS WEEKLY
Qty: 2 ML | Refills: 1 | Status: SHIPPED | OUTPATIENT
Start: 2025-01-21 | End: 2025-03-18

## 2025-01-21 NOTE — PROGRESS NOTES
Pharmacist Clinic: Cardiology Management    Mario Alberto Pena is a 74 y.o. female was referred to Clinical Pharmacy Team for Weight Loss management.     Referring Provider: Shanique Longo APRN*    THIS IS A NEW PATIENT APPOINTMENT. PATIENT WILL BE ESTABLISHING CARE WITH CLINICAL PHARMACY.    Appointment was completed by Mario Alberto who was reached at primary number.    Allergies Reviewed? Yes    Allergies   Allergen Reactions    Heparin Other     Low Platelet Count    Lisinopril Cough    Penicillins Unknown       Past Medical History:   Diagnosis Date    Allergic contact dermatitis due to plants, except food 08/24/2018    Poison ivy    Cerebrovascular disease, unspecified 03/13/2015    Ill-defined cerebrovascular disease    Encounter for follow-up examination after completed treatment for conditions other than malignant neoplasm 11/25/2019    Hospital discharge follow-up    Encounter for screening mammogram for malignant neoplasm of breast 09/21/2016    Encounter for mammogram to establish baseline mammogram    Hyperlipidemia, unspecified 03/13/2015    Dyslipidemia    Low back pain, unspecified 10/09/2019    Acute exacerbation of chronic low back pain    Low back pain, unspecified 10/09/2019    Acute exacerbation of chronic low back pain    Low back pain, unspecified 07/25/2017    Acute exacerbation of chronic low back pain    Other specified postprocedural states     H/O colonoscopy    Personal history of other diseases of the musculoskeletal system and connective tissue 04/05/2018    History of muscle spasm    Personal history of other diseases of urinary system 03/15/2018    History of hematuria    Personal history of other medical treatment     H/O bone density study    Personal history of other medical treatment     History of mammogram    Personal history of other specified conditions 05/02/2020    History of nasal congestion    Personal history of other specified conditions 11/25/2019    History of  bacteremia    Rash and other nonspecific skin eruption 03/28/2016    Skin rash    Strain of muscle, fascia and tendon of lower back, initial encounter 04/06/2016    Lumbar strain    Urinary tract infection, site not specified 04/05/2018    Acute UTI    Vitamin D deficiency, unspecified 02/08/2016    Vitamin D deficiency       Current Outpatient Medications on File Prior to Visit   Medication Sig Dispense Refill    acetaminophen (Tylenol Extra Strength) 500 mg tablet Take 2 tablets (1,000 mg) by mouth every 6 hours if needed.      albuterol 90 mcg/actuation inhaler Inhale 2 puffs every 4 hours if needed.      amLODIPine (Norvasc) 10 mg tablet Take 1 tablet (10 mg) by mouth once daily. 90 tablet 3    aspirin (Aspirin Childrens) 81 mg chewable tablet Chew 1 tablet (81 mg) once daily.      clindamycin (Cleocin) 300 mg capsule Take 2 capsules (600 mg) by mouth 1 time. 60 MINUTES PRIOR TO DENTAL CLEANINGS AND PROCEDURES      docusate sodium (Colace) 100 mg capsule Take 1 capsule (100 mg) by mouth 2 times a day as needed for constipation (May buy over the counter.).      escitalopram (Lexapro) 20 mg tablet Take 1 tablet (20 mg) by mouth once daily. 90 tablet 1    folic acid (Folvite) 1 mg tablet Take 1 tablet (1 mg) by mouth once daily.      glucos sul 2KCl/msm/chond/C/Mn (GLUCOSAMINE CHONDROITIN ORAL) Take 1 tablet by mouth once daily. Glucosamine and Chondroit-MV-Min3 669-901-18-0.5 mg ORAL Tab      ipratropium-albuteroL (Duo-Neb) 0.5-2.5 mg/3 mL nebulizer solution Inhale 3 mL every 2 hours if needed for shortness of breath.      lidocaine (LMX) 4 % cream Apply topically 4 times a day as needed for mild pain (1 - 3).      losartan (Cozaar) 25 mg tablet Take 1 tablet (25 mg) by mouth once daily. 90 tablet 3    metoprolol tartrate (Lopressor) 25 mg tablet Take 0.5 tablets (12.5 mg) by mouth every 12 hours. 90 tablet 3    multivitamin-min-iron-FA-vit K (Adults Multivitamin) 18 mg iron-400 mcg-25 mcg tablet Take 1 tablet by  "mouth once daily.      oxybutynin XL (Ditropan-XL) 5 mg 24 hr tablet Take 1 tablet (5 mg) by mouth once daily. 90 tablet 1    oxygen (O2) therapy 3 L continuously.      pantoprazole (ProtoNix) 40 mg EC tablet Take 1 tablet (40 mg) by mouth once daily in the morning. Take before meals. 90 tablet 3    rosuvastatin (Crestor) 40 mg tablet TAKE ONE TABLET BY MOUTH DAILY AT BEDTIME 90 tablet 3    warfarin (Coumadin) 10 mg tablet Take 1 tablet (10 mg) by mouth once daily.      warfarin (Coumadin) 2.5 mg tablet 1 tab qd 90 tablet 1    warfarin (Coumadin) 5 mg tablet Take 1 tablet (5 mg) by mouth once daily at bedtime. Daily as directed 90 tablet 1     No current facility-administered medications on file prior to visit.         RELEVANT LAB RESULTS:  Lab Results   Component Value Date    BILITOT 0.6 07/26/2024    CALCIUM 10.6 (H) 12/19/2024    CO2 27 12/19/2024     (H) 12/19/2024    CREATININE 1.02 12/19/2024    GLUCOSE 126 (H) 12/19/2024    ALKPHOS 83 07/26/2024    K 4.5 12/19/2024    PROT 7.0 07/26/2024     12/19/2024    AST 21 07/26/2024    ALT 25 07/26/2024    BUN 14 12/19/2024    ANIONGAP 10 12/19/2024    MG 1.81 07/12/2023    PHOS 2.9 07/12/2023     11/02/2020    ALBUMIN 4.7 07/26/2024    GFRF 52 (A) 07/12/2023     Lab Results   Component Value Date    TRIG 141 03/27/2024    CHOL 150 03/27/2024    LDLCALC 57 03/27/2024    HDL 64.4 03/27/2024     No results found for: \"BMCBC\", \"CBCDIF\"     PHARMACEUTICAL ASSESSMENT:    MEDICATION RECONCILIATION    Home Pharmacy Reviewed? Yes, describe: [facility]    No changes were made to home medication list    Drug Interactions? No    Medication Documentation Review Audit       Reviewed by Himanshu Wiggins PharmD (Pharmacist) on 01/21/25 at 0945      Medication Order Taking? Sig Documenting Provider Last Dose Status   acetaminophen (Tylenol Extra Strength) 500 mg tablet [id number] Yes Take 2 tablets (1,000 mg) by mouth every 6 hours if needed. " Historical Provider, MD  Active   albuterol 90 mcg/actuation inhaler [id number] Yes Inhale 2 puffs every 4 hours if needed. Historical Provider, MD  Active   amLODIPine (Norvasc) 10 mg tablet [id number] Yes Take 1 tablet (10 mg) by mouth once daily. Silas Lawson MD  Active   aspirin (Aspirin Childrens) 81 mg chewable tablet [id number] Yes Chew 1 tablet (81 mg) once daily. Historical Provider, MD  Active   clindamycin (Cleocin) 300 mg capsule [id number] Yes Take 2 capsules (600 mg) by mouth 1 time. 60 MINUTES PRIOR TO DENTAL CLEANINGS AND PROCEDURES Historical Provider, MD  Active   docusate sodium (Colace) 100 mg capsule [id number] Yes Take 1 capsule (100 mg) by mouth 2 times a day as needed for constipation (May buy over the counter.). Historical Provider, MD  Active   escitalopram (Lexapro) 20 mg tablet [id number] Yes Take 1 tablet (20 mg) by mouth once daily. Jigar Hurst MD  Active   folic acid (Folvite) 1 mg tablet [id number] Yes Take 1 tablet (1 mg) by mouth once daily. Historical Provider, MD  Active   glucos sul 2KCl/msm/chond/C/Mn (GLUCOSAMINE CHONDROITIN ORAL) [id number] Yes Take 1 tablet by mouth once daily. Glucosamine and Chondroit-MV-Min3 [phone] mg ORAL Tab Historical Provider, MD  Active   ipratropium-albuteroL (Duo-Neb) 0.5-2.5 mg/3 mL nebulizer solution [id number] Yes Inhale 3 mL every 2 hours if needed for shortness of breath. Historical Provider, MD  Active   lidocaine (LMX) 4 % cream [id number] Yes Apply topically 4 times a day as needed for mild pain (1 - 3). Historical Provider, MD  Active   losartan (Cozaar) 25 mg tablet [id number] Yes Take 1 tablet (25 mg) by mouth once daily. Silas Lawson MD  Active   metoprolol tartrate (Lopressor) 25 mg tablet [id number] Yes Take 0.5 tablets (12.5 mg) by mouth every 12 hours. Silas Lawson MD  Active   multivitamin-min-iron-FA-vit K (Adults Multivitamin) 18 mg iron-400 mcg-25 mcg tablet [id number] Yes Take 1 tablet by mouth once daily. Historical  Provider, MD  Active   oxybutynin XL (Ditropan-XL) 5 mg 24 hr tablet [id number] Yes Take 1 tablet (5 mg) by mouth once daily. Jigar Hurst MD  Active   oxygen (O2) therapy [id number] Yes 3 L continuously. Historical Provider, MD  Active   pantoprazole (ProtoNix) 40 mg EC tablet [id number] Yes Take 1 tablet (40 mg) by mouth once daily in the morning. Take before meals. Silas Lawson MD  Active   rosuvastatin (Crestor) 40 mg tablet [id number] Yes TAKE ONE TABLET BY MOUTH DAILY AT BEDTIME Shanique Longo, APRN-CNP  Active   warfarin (Coumadin) 10 mg tablet [id number] Yes Take 1 tablet (10 mg) by mouth once daily. Historical Provider, MD  Active   warfarin (Coumadin) 2.5 mg tablet [id number] Yes 1 tab qd Jigar Hurst MD  Active   warfarin (Coumadin) 5 mg tablet [id number] Yes Take 1 tablet (5 mg) by mouth once daily at bedtime. Daily as directed Jigar Hurst MD  Active                    DISEASE MANAGEMENT ASSESSMENT:     WEIGHT LOSS ASSESSMENT     Wt Readings from Last 3 Encounters:   12/19/24 86.5 kg (190 lb 11.2 oz)   09/17/24 84.8 kg (187 lb)   06/26/24 83.4 kg (183 lb 12.8 oz)     BMI Readings from Last 3 Encounters:   12/19/24 30.78 kg/m²   09/17/24 30.18 kg/m²   06/26/24 29.67 kg/m²         CURRENT PHARMACOTHERAPY   none     Affordability/Accessibility: PA required through insurance for Wegovy  Adherence/Organization: new start medication  Adverse Reactions: new start medication    Relevant PMH:  PMH of Pancreatitis? No  PMH of Retinopathy? No  PMH of MTC? No    PATIENT EDUCATION/GOALS  Wegovy must be refrigerated. If necessary, the pen may be kept at room temperature for up to 28 days. Each box comes with 4 pens, one for each week.     1.  Remove the pen from the refrigerator. Keep the pen cap on until you are ready to inject.   2.  Check the pen label to make sure that it is the correct medication and dose. Also check to make sure that the solution is not cloudy, discolored or have particles in it.   3.  Prior to administration wash your hands.   4. Choose your injection site either your abdomen or thigh (if someone else is giving the injection the upper arm can also be used).   5. Ensure to change (rotate) injection sites each week. You can use the same area of the body but inject in a different part of that area.   6. Once the injection site has been selected use an alcohol swab to clean off the area.   7. Remove the grey cap and press firmly onto the injection site.   8. Push the pen against the skin until the yellow bar has stopped moving. You will hear two clicks; one indicates that the injection has started, and the other indicates an ongoing injection. The injection process will take about 10 seconds.  9. Do not rub the area after administration   10. Remove pen and discard in a sharps container (such as a milk jug, detergent bottle, or coffee canister)   11. Injections should be done on the same day each week, if you forget you have up to 5 days to take your dose.    -  Counseled patient on Wegovy MOA, expectations, side effects, duration of therapy, administration, and monitoring parameters.  - Advised patient that they may experience improved satiety after meals and portion sizes of meals may be reduced as doses of Wegovy increase.  - Counseled patient to avoid foods that are fatty/oily as this may precipitate the nausea/GI upset that may occur with new start Wegovy.     COUNSELING:   - Counseled patient on MOA, expectations, side effects, duration of therapy, contraindications, administration, and monitoring parameters  - Answered all patient questions and concerns; provided [facility] phone number if issues/questions arise       DISCUSSION/NOTES:   Above Wegovy education given to patient.  Reminded educational videos are on the wegovy website if she does not remember this information.  She has not used any self injectable medications in the past.  Reach out to pharmacy team with any concerns with first  injeciton of the medication.    ASSESSMENT:    Assessment/Plan   Problem List Items Addressed This Visit       CAD (coronary artery disease)     Starting Wegovy for CAD benefit. Patient's BMI of 30 appropriate to start at this time.         Relevant Medications    semaglutide, weight loss, (Wegovy) 0.25 mg/0.5 mL pen injector    Other Relevant Orders    Referral to Clinical Pharmacy     Other Visit Diagnoses       Obesity (BMI 30-39.9)        Relevant Medications    semaglutide, weight loss, (Wegovy) 0.25 mg/0.5 mL pen injector    Other Relevant Orders    Referral to Clinical Pharmacy              RECOMMENDATIONS/PLAN:    START Wegovy 0.25mg weekly    Last Appnt with Referring Provider: 12/19/24  Next Appnt with Referring Provider: 6/24/25  Clinical Pharmacist follow up: 2/18/25  VAF/Application Expiration: No  Type of Encounter: Bang Wiggins PharmD    Verbal consent to manage patient's drug therapy was obtained from the patient . They were informed they may decline to participate or withdraw from participation in pharmacy services at any time.    Continue all meds under the continuation of care with the referring provider and clinical pharmacy team.

## 2025-01-21 NOTE — Clinical Note
Mario Alberto given information on Weogvy.  PA team working on the prior authorization a this time. Will discuss cost assistance if the medication if there is a copay associated with the wegovy.

## 2025-02-03 ENCOUNTER — ANTICOAGULATION - WARFARIN VISIT (OUTPATIENT)
Dept: CARDIOLOGY | Facility: CLINIC | Age: 75
End: 2025-02-03
Payer: MEDICARE

## 2025-02-03 DIAGNOSIS — Z95.2 S/P AVR (AORTIC VALVE REPLACEMENT): Primary | ICD-10-CM

## 2025-02-03 DIAGNOSIS — I48.11 LONGSTANDING PERSISTENT ATRIAL FIBRILLATION (MULTI): ICD-10-CM

## 2025-02-03 LAB
INR IN PPP BY COAGULATION ASSAY EXTERNAL: 2.6
PROTHROMBIN TIME (PT) IN PPP BY COAGULATION ASSAY EXTERNAL: NORMAL

## 2025-02-03 NOTE — PROGRESS NOTES
Patient identification verified with 2 identifiers.    Location: [facility] Patient Self-Testing Program [phone]    Referring Physician: Silas Lawson MD   Enrollment/ Re-enrollment date: 2025   INR Goal: 2.0-3.0  INR monitoring is per [facility] protocol.  Anticoagulation Medication: warfarin  Indication: Aortic Valve Replacement    Subjective   Bleeding signs/symptoms: No    Bruising: No   Major bleeding event: No  Thrombosis signs/symptoms: No  Thromboembolic event: No  Missed doses: No  Extra doses: No  Medication changes: No  Dietary changes: No  Change in health: No  Change in activity: No  Alcohol: No  Other concerns: No    Upcoming Procedures:  Does the Patient Have any upcoming procedures that require interruption in anticoagulation therapy? no  Does the patient require bridging? no      Anticoagulation Summary  As of 2/3/2025      INR goal:  2.0-3.0   TTR:  75.2% (1.4 y)   INR used for dosin.60 (2/3/2025)   Weekly warfarin total:  45 mg               Assessment/Plan   Therapeutic     1. New dose: no change    2. Next INR:        Education provided to patient during the visit:  Patient instructed to call in interim with questions, concerns and changes.

## 2025-02-04 ENCOUNTER — TELEPHONE (OUTPATIENT)
Dept: CARDIOLOGY | Facility: HOSPITAL | Age: 75
End: 2025-02-04
Payer: MEDICARE

## 2025-02-04 DIAGNOSIS — E78.5 HYPERLIPIDEMIA, UNSPECIFIED HYPERLIPIDEMIA TYPE: Primary | ICD-10-CM

## 2025-02-06 RX ORDER — ROSUVASTATIN CALCIUM 40 MG/1
40 TABLET, COATED ORAL NIGHTLY
Qty: 90 TABLET | Refills: 3 | Status: SHIPPED | OUTPATIENT
Start: 2025-02-06 | End: 2026-02-06

## 2025-02-18 ENCOUNTER — APPOINTMENT (OUTPATIENT)
Dept: PHARMACY | Facility: HOSPITAL | Age: 75
End: 2025-02-18
Payer: MEDICARE

## 2025-02-18 DIAGNOSIS — E66.9 OBESITY (BMI 30-39.9): ICD-10-CM

## 2025-02-18 DIAGNOSIS — I25.10 CORONARY ARTERY DISEASE INVOLVING NATIVE HEART WITHOUT ANGINA PECTORIS, UNSPECIFIED VESSEL OR LESION TYPE: ICD-10-CM

## 2025-02-18 PROCEDURE — RXMED WILLOW AMBULATORY MEDICATION CHARGE

## 2025-02-18 RX ORDER — SEMAGLUTIDE 0.5 MG/.5ML
0.5 INJECTION, SOLUTION SUBCUTANEOUS WEEKLY
Qty: 2 ML | Refills: 0 | Status: SHIPPED | OUTPATIENT
Start: 2025-02-18

## 2025-02-18 NOTE — PROGRESS NOTES
Pharmacist Clinic: Cardiology Management    Mario Alberto Pena is a 74 y.o. female was referred to Clinical Pharmacy Team for Weight Loss management.     Referring Provider: Shanique Longo APRN*    THIS IS A FOLLOW UP PATIENT APPOINTMENT. AT LAST VISIT ON 1/21/25 WITH PHARMACIST (Himanshu Wiggins).    Appointment was completed by Mario Alberto who was reached at primary number.    REVIEW OF PAST APPNT (IF APPLICABLE):   During last appointment patient was given information about Wegovy. Educated how to use, store, and what to expect when starting Wegovy.    Allergies   Allergen Reactions    Heparin Other     Low Platelet Count    Lisinopril Cough    Penicillins Unknown       Past Medical History:   Diagnosis Date    Allergic contact dermatitis due to plants, except food 08/24/2018    Poison ivy    Cerebrovascular disease, unspecified 03/13/2015    Ill-defined cerebrovascular disease    Encounter for follow-up examination after completed treatment for conditions other than malignant neoplasm 11/25/2019    Hospital discharge follow-up    Encounter for screening mammogram for malignant neoplasm of breast 09/21/2016    Encounter for mammogram to establish baseline mammogram    Hyperlipidemia, unspecified 03/13/2015    Dyslipidemia    Low back pain, unspecified 10/09/2019    Acute exacerbation of chronic low back pain    Low back pain, unspecified 10/09/2019    Acute exacerbation of chronic low back pain    Low back pain, unspecified 07/25/2017    Acute exacerbation of chronic low back pain    Other specified postprocedural states     H/O colonoscopy    Personal history of other diseases of the musculoskeletal system and connective tissue 04/05/2018    History of muscle spasm    Personal history of other diseases of urinary system 03/15/2018    History of hematuria    Personal history of other medical treatment     H/O bone density study    Personal history of other medical treatment     History of mammogram    Personal  history of other specified conditions 05/02/2020    History of nasal congestion    Personal history of other specified conditions 11/25/2019    History of bacteremia    Rash and other nonspecific skin eruption 03/28/2016    Skin rash    Strain of muscle, fascia and tendon of lower back, initial encounter 04/06/2016    Lumbar strain    Urinary tract infection, site not specified 04/05/2018    Acute UTI    Vitamin D deficiency, unspecified 02/08/2016    Vitamin D deficiency       Current Outpatient Medications on File Prior to Visit   Medication Sig Dispense Refill    acetaminophen (Tylenol Extra Strength) 500 mg tablet Take 2 tablets (1,000 mg) by mouth every 6 hours if needed.      albuterol 90 mcg/actuation inhaler Inhale 2 puffs every 4 hours if needed.      amLODIPine (Norvasc) 10 mg tablet Take 1 tablet (10 mg) by mouth once daily. 90 tablet 3    aspirin (Aspirin Childrens) 81 mg chewable tablet Chew 1 tablet (81 mg) once daily.      clindamycin (Cleocin) 300 mg capsule Take 2 capsules (600 mg) by mouth 1 time. 60 MINUTES PRIOR TO DENTAL CLEANINGS AND PROCEDURES      docusate sodium (Colace) 100 mg capsule Take 1 capsule (100 mg) by mouth 2 times a day as needed for constipation (May buy over the counter.).      escitalopram (Lexapro) 20 mg tablet Take 1 tablet (20 mg) by mouth once daily. 90 tablet 1    folic acid (Folvite) 1 mg tablet Take 1 tablet (1 mg) by mouth once daily.      glucos sul 2KCl/msm/chond/C/Mn (GLUCOSAMINE CHONDROITIN ORAL) Take 1 tablet by mouth once daily. Glucosamine and Chondroit-MV-Min3 650-370-97-0.5 mg ORAL Tab      ipratropium-albuteroL (Duo-Neb) 0.5-2.5 mg/3 mL nebulizer solution Inhale 3 mL every 2 hours if needed for shortness of breath.      lidocaine (LMX) 4 % cream Apply topically 4 times a day as needed for mild pain (1 - 3).      losartan (Cozaar) 25 mg tablet Take 1 tablet (25 mg) by mouth once daily. 90 tablet 3    metoprolol tartrate (Lopressor) 25 mg tablet Take 0.5  "tablets (12.5 mg) by mouth every 12 hours. 90 tablet 3    multivitamin-min-iron-FA-vit K (Adults Multivitamin) 18 mg iron-400 mcg-25 mcg tablet Take 1 tablet by mouth once daily.      oxybutynin XL (Ditropan-XL) 5 mg 24 hr tablet Take 1 tablet (5 mg) by mouth once daily. 90 tablet 1    oxygen (O2) therapy 3 L continuously.      pantoprazole (ProtoNix) 40 mg EC tablet Take 1 tablet (40 mg) by mouth once daily in the morning. Take before meals. 90 tablet 3    rosuvastatin (Crestor) 40 mg tablet Take 1 tablet (40 mg) by mouth once daily at bedtime. 90 tablet 3    warfarin (Coumadin) 10 mg tablet Take 1 tablet (10 mg) by mouth once daily.      warfarin (Coumadin) 2.5 mg tablet 1 tab qd 90 tablet 1    warfarin (Coumadin) 5 mg tablet Take 1 tablet (5 mg) by mouth once daily at bedtime. Daily as directed 90 tablet 1    [DISCONTINUED] semaglutide, weight loss, (Wegovy) 0.25 mg/0.5 mL pen injector Inject 0.25 mg under the skin 1 (one) time per week. 2 mL 1     No current facility-administered medications on file prior to visit.         RELEVANT LAB RESULTS:  Lab Results   Component Value Date    BILITOT 0.6 07/26/2024    CALCIUM 10.6 (H) 12/19/2024    CO2 27 12/19/2024     (H) 12/19/2024    CREATININE 1.02 12/19/2024    GLUCOSE 126 (H) 12/19/2024    ALKPHOS 83 07/26/2024    K 4.5 12/19/2024    PROT 7.0 07/26/2024     12/19/2024    AST 21 07/26/2024    ALT 25 07/26/2024    BUN 14 12/19/2024    ANIONGAP 10 12/19/2024    MG 1.81 07/12/2023    PHOS 2.9 07/12/2023     11/02/2020    ALBUMIN 4.7 07/26/2024    GFRF 52 (A) 07/12/2023     Lab Results   Component Value Date    TRIG 141 03/27/2024    CHOL 150 03/27/2024    LDLCALC 57 03/27/2024    HDL 64.4 03/27/2024     No results found for: \"BMCBC\", \"CBCDIF\"     PHARMACEUTICAL ASSESSMENT:    Drug Interactions? No    Medication Documentation Review Audit       Reviewed by Himanshu Wiggins, PharmD (Pharmacist) on 01/21/25 at 0945      Medication Order Taking? Sig " Documenting Provider Last Dose Status   acetaminophen (Tylenol Extra Strength) 500 mg tablet [id number] Yes Take 2 tablets (1,000 mg) by mouth every 6 hours if needed. Historical Provider, MD  Active   albuterol 90 mcg/actuation inhaler [id number] Yes Inhale 2 puffs every 4 hours if needed. Historical Provider, MD  Active   amLODIPine (Norvasc) 10 mg tablet [id number] Yes Take 1 tablet (10 mg) by mouth once daily. Silas Lawson MD  Active   aspirin (Aspirin Childrens) 81 mg chewable tablet [id number] Yes Chew 1 tablet (81 mg) once daily. Historical Provider, MD  Active   clindamycin (Cleocin) 300 mg capsule [id number] Yes Take 2 capsules (600 mg) by mouth 1 time. 60 MINUTES PRIOR TO DENTAL CLEANINGS AND PROCEDURES Historical Provider, MD  Active   docusate sodium (Colace) 100 mg capsule [id number] Yes Take 1 capsule (100 mg) by mouth 2 times a day as needed for constipation (May buy over the counter.). Historical Provider, MD  Active   escitalopram (Lexapro) 20 mg tablet [id number] Yes Take 1 tablet (20 mg) by mouth once daily. Jigar Hurst MD  Active   folic acid (Folvite) 1 mg tablet [id number] Yes Take 1 tablet (1 mg) by mouth once daily. Historical Provider, MD  Active   glucos sul 2KCl/msm/chond/C/Mn (GLUCOSAMINE CHONDROITIN ORAL) [id number] Yes Take 1 tablet by mouth once daily. Glucosamine and Chondroit-MV-Min3 463-688-28-0.5 mg ORAL Tab Historical Provider, MD  Active   ipratropium-albuteroL (Duo-Neb) 0.5-2.5 mg/3 mL nebulizer solution [id number] Yes Inhale 3 mL every 2 hours if needed for shortness of breath. Historical Provider, MD  Active   lidocaine (LMX) 4 % cream [id number] Yes Apply topically 4 times a day as needed for mild pain (1 - 3). Historical Provider, MD  Active   losartan (Cozaar) 25 mg tablet [id number] Yes Take 1 tablet (25 mg) by mouth once daily. Silas Lawson MD  Active   metoprolol tartrate (Lopressor) 25 mg tablet [id number] Yes Take 0.5 tablets (12.5 mg) by mouth every 12 hours. Silas KOO  MD Renny  Active   multivitamin-min-iron-FA-vit K (Adults Multivitamin) 18 mg iron-400 mcg-25 mcg tablet [id number] Yes Take 1 tablet by mouth once daily. Historical Provider, MD  Active   oxybutynin XL (Ditropan-XL) 5 mg 24 hr tablet [id number] Yes Take 1 tablet (5 mg) by mouth once daily. Jigar Hurst MD  Active   oxygen (O2) therapy [id number] Yes 3 L continuously. Historical Provider, MD  Active   pantoprazole (ProtoNix) 40 mg EC tablet [id number] Yes Take 1 tablet (40 mg) by mouth once daily in the morning. Take before meals. Silas Lawson MD  Active   rosuvastatin (Crestor) 40 mg tablet [id number] Yes TAKE ONE TABLET BY MOUTH DAILY AT BEDTIME Shanique Longo APRN-CNP  Active   warfarin (Coumadin) 10 mg tablet [id number] Yes Take 1 tablet (10 mg) by mouth once daily. Historical Provider, MD  Active   warfarin (Coumadin) 2.5 mg tablet [id number] Yes 1 tab qd Jigar Hurst MD  Active   warfarin (Coumadin) 5 mg tablet [id number] Yes Take 1 tablet (5 mg) by mouth once daily at bedtime. Daily as directed Jigar Hurst MD  Active                    DISEASE MANAGEMENT ASSESSMENT:     WEIGHT LOSS ASSESSMENT     Wt Readings from Last 3 Encounters:   12/19/24 86.5 kg (190 lb 11.2 oz)   09/17/24 84.8 kg (187 lb)   06/26/24 83.4 kg (183 lb 12.8 oz)     BMI Readings from Last 3 Encounters:   12/19/24 30.78 kg/m²   09/17/24 30.18 kg/m²   06/26/24 29.67 kg/m²       Recorded Home Weight(s): has not used scale recently  Diet: small breakfast every morning, always eat lunch, and will some days skip dinner  Exercise Routine: daily PT exercises    CURRENT PHARMACOTHERAPY   Wegovy 0.25mg weekly     Affordability/Accessibility: reports affordable  Adherence/Organization: used first injection this week  Adverse Reactions: some nausea and belching    Relevant PMH:  PMH of Pancreatitis? No  PMH of Retinopathy? No  PMH of MTC? No    COUNSELING:   - Counseled patient on MOA, expectations, side effects, duration of therapy,  contraindications, administration, and monitoring parameters  - Answered all patient questions and concerns; provided [facility] phone number if issues/questions arise       DISCUSSION/NOTES:   Some nausea reported with first injection. No changes to diet reported. Working on exercising every day.  Expecting nausea to subside over time. Will assess at follow up.  Discussed monitoring weight to assess for weight loss while on Wegovy. Current BMI 30.  Had some issues receiving Wegovy through [employer] will have future fills at  pharmacies and mail to home.    ASSESSMENT:    Assessment/Plan   Problem List Items Addressed This Visit       CAD (coronary artery disease)     Patient understands titration plan for Wegovy for best cardiac support.         Relevant Medications    semaglutide, weight loss, (Wegovy) 0.5 mg/0.5 mL pen injector    Other Relevant Orders    Referral to Clinical Pharmacy     Other Visit Diagnoses       Obesity (BMI 30-39.9)        Relevant Medications    semaglutide, weight loss, (Wegovy) 0.5 mg/0.5 mL pen injector    Other Relevant Orders    Referral to Clinical Pharmacy              RECOMMENDATIONS/PLAN:    INCREASE - once completing 4 weeks of 0.25mg  Weogvy 0.5mg weekly    Last Appnt with Referring Provider: 12/19/24  Next Appnt with Referring Provider: 6/24/25  Clinical Pharmacist follow up: 4/1/25  VAF/Application Expiration: No - did not discuss  Type of Encounter: Bang Wiggins PharmD    Verbal consent to manage patient's drug therapy was obtained from the patient . They were informed they may decline to participate or withdraw from participation in pharmacy services at any time.    Continue all meds under the continuation of care with the referring provider and clinical pharmacy team.

## 2025-02-19 ENCOUNTER — ANTICOAGULATION - WARFARIN VISIT (OUTPATIENT)
Dept: CARDIOLOGY | Facility: CLINIC | Age: 75
End: 2025-02-19
Payer: MEDICARE

## 2025-02-19 DIAGNOSIS — Z95.2 S/P AVR (AORTIC VALVE REPLACEMENT): Primary | ICD-10-CM

## 2025-02-19 DIAGNOSIS — I48.11 LONGSTANDING PERSISTENT ATRIAL FIBRILLATION (MULTI): ICD-10-CM

## 2025-02-19 LAB
INR IN PPP BY COAGULATION ASSAY EXTERNAL: 3.5
PROTHROMBIN TIME (PT) IN PPP BY COAGULATION ASSAY EXTERNAL: NORMAL

## 2025-02-19 NOTE — PROGRESS NOTES
Patient identification verified with 2 identifiers.    Location: [facility] Patient Self-Testing Program [phone]    Referring Physician: Silas Lawson MD   Enrollment/ Re-enrollment date: EDWIN Lawson MD   INR Goal: 2.0-3.0  INR monitoring is per [facility] protocol.  Anticoagulation Medication: warfarin  Indication: Aortic Valve Replacement    Subjective   Bleeding signs/symptoms: No    Bruising: No   Major bleeding event: No  Thrombosis signs/symptoms: No  Thromboembolic event: No  Missed doses: No  Extra doses: No  Medication changes: Yes  wegovy once weekly started three days ago  Dietary changes: No  Change in health: No  Change in activity: No  Alcohol: No  Other concerns: No    Upcoming Procedures:  Does the Patient Have any upcoming procedures that require interruption in anticoagulation therapy? no  Does the patient require bridging? no      Anticoagulation Summary  As of 2/19/2025      INR goal:  2.0-3.0   TTR:  74.2% (1.4 y)   INR used for dosing:  3.50 (2/19/2025)   Weekly warfarin total:  40 mg               Assessment/Plan   Supratherapeutic     1. New dose:  dose reduced     2. Next INR: 1 week      Education provided to patient during the visit:  Patient instructed to call in interim with questions, concerns and changes.

## 2025-02-22 ENCOUNTER — PHARMACY VISIT (OUTPATIENT)
Dept: PHARMACY | Facility: CLINIC | Age: 75
End: 2025-02-22
Payer: MEDICARE

## 2025-02-26 ENCOUNTER — ANTICOAGULATION - WARFARIN VISIT (OUTPATIENT)
Dept: CARDIOLOGY | Facility: CLINIC | Age: 75
End: 2025-02-26
Payer: MEDICARE

## 2025-02-26 DIAGNOSIS — I48.11 LONGSTANDING PERSISTENT ATRIAL FIBRILLATION (MULTI): ICD-10-CM

## 2025-02-26 DIAGNOSIS — Z95.2 S/P AVR (AORTIC VALVE REPLACEMENT): Primary | ICD-10-CM

## 2025-02-26 LAB
INR IN PPP BY COAGULATION ASSAY EXTERNAL: 1.5
PROTHROMBIN TIME (PT) IN PPP BY COAGULATION ASSAY EXTERNAL: NORMAL

## 2025-02-26 NOTE — PROGRESS NOTES
Patient identification verified with 2 identifiers.    Location: [facility] Patient Self-Testing Program [phone]    Referring Physician: Silas Lawson MD   Enrollment/ Re-enrollment date: EDWIN Lawson MD   INR Goal: 2.0-3.0  INR monitoring is per [facility] protocol.  Anticoagulation Medication: warfarin  Indication: Aortic Valve Replacement    Subjective   Bleeding signs/symptoms: No  Bruising: No   Major bleeding event: No  Thrombosis signs/symptoms: No  Thromboembolic event: No  Missed doses: No  Extra doses: No  Medication changes: Yes  tylenol for leg pain after fall yeseterday  Dietary changes: No  Change in health: No  Change in activity: No  Alcohol: No  Other concerns: No    Upcoming Procedures:  Does the Patient Have any upcoming procedures that require interruption in anticoagulation therapy? no  Does the patient require bridging? no      Anticoagulation Summary  As of 2025      INR goal:  2.0-3.0   TTR:  73.9% (1.4 y)   INR used for dosin.50 (2025)   Weekly warfarin total:  42.5 mg               Assessment/Plan   Subtherapeutic     1. New dose:  weekly dose increase     2. Next INR: 1 week      Education provided to patient during the visit:  Patient instructed to call in interim with questions, concerns and changes.   Patient educated on compliance with dosing, follow up appointments, and prescribed plan of care.

## 2025-03-02 LAB
ATRIAL RATE: 70 BPM
P AXIS: 48 DEGREES
P OFFSET: 195 MS
P ONSET: 144 MS
PR INTERVAL: 146 MS
Q ONSET: 217 MS
QRS COUNT: 11 BEATS
QRS DURATION: 94 MS
QT INTERVAL: 400 MS
QTC CALCULATION(BAZETT): 432 MS
QTC FREDERICIA: 421 MS
R AXIS: 77 DEGREES
T AXIS: 119 DEGREES
T OFFSET: 417 MS
VENTRICULAR RATE: 70 BPM

## 2025-03-05 ENCOUNTER — ANTICOAGULATION - WARFARIN VISIT (OUTPATIENT)
Dept: CARDIOLOGY | Facility: CLINIC | Age: 75
End: 2025-03-05
Payer: MEDICARE

## 2025-03-05 DIAGNOSIS — Z95.2 S/P AVR (AORTIC VALVE REPLACEMENT): ICD-10-CM

## 2025-03-05 DIAGNOSIS — I48.11 LONGSTANDING PERSISTENT ATRIAL FIBRILLATION (MULTI): ICD-10-CM

## 2025-03-05 LAB
INR IN PPP BY COAGULATION ASSAY EXTERNAL: 1.9
PROTHROMBIN TIME (PT) IN PPP BY COAGULATION ASSAY EXTERNAL: NORMAL

## 2025-03-05 NOTE — PROGRESS NOTES
Patient identification verified with 2 identifiers.    Location: [facility] Patient Self-Testing Program [phone]    Referring Physician: Silas Lawson MD   Enrollment/ Re-enrollment date: 2025   INR Goal: 2.0-3.0  INR monitoring is per [facility] protocol.  Anticoagulation Medication: warfarin  Indication: Aortic Valve Replacement    Subjective   Bleeding signs/symptoms: No  Bruising: No   Major bleeding event: No  Thrombosis signs/symptoms: No  Thromboembolic event: No  Missed doses: No  Extra doses: No  Medication changes: Yes  continues taking tylenol after falling last week  Dietary changes: No  Change in health: No  Change in activity: No  Alcohol: No  Other concerns: No    Upcoming Procedures:  Does the Patient Have any upcoming procedures that require interruption in anticoagulation therapy? no  Does the patient require bridging? no      Anticoagulation Summary  As of 3/5/2025      INR goal:  2.0-3.0   TTR:  72.9% (1.4 y)   INR used for dosin.90 (3/5/2025)   Weekly warfarin total:  45 mg               Assessment/Plan   Subtherapeutic     1. New dose:  weekly dose increase     2. Next INR: 1 week      Education provided to patient during the visit:  Patient instructed to call in interim with questions, concerns and changes.   Patient educated on compliance with dosing, follow up appointments, and prescribed plan of care.

## 2025-03-12 ENCOUNTER — ANTICOAGULATION - WARFARIN VISIT (OUTPATIENT)
Dept: CARDIOLOGY | Facility: CLINIC | Age: 75
End: 2025-03-12
Payer: MEDICARE

## 2025-03-12 DIAGNOSIS — I48.11 LONGSTANDING PERSISTENT ATRIAL FIBRILLATION (MULTI): ICD-10-CM

## 2025-03-12 DIAGNOSIS — Z95.2 S/P AVR (AORTIC VALVE REPLACEMENT): Primary | ICD-10-CM

## 2025-03-12 LAB
INR IN PPP BY COAGULATION ASSAY EXTERNAL: 3.7
PROTHROMBIN TIME (PT) IN PPP BY COAGULATION ASSAY EXTERNAL: NORMAL

## 2025-03-12 NOTE — PROGRESS NOTES
Patient identification verified with 2 identifiers.    Location: [facility] Patient Self-Testing Program [phone]    Referring Physician: Silas Lawson MD   Enrollment/ Re-enrollment date: 8/26/2025   INR Goal: 2.0-3.0  INR monitoring is per [facility] protocol.  Anticoagulation Medication: warfarin  Indication: Aortic Valve Replacement    Subjective   Bleeding signs/symptoms: No    Bruising: No   Major bleeding event: No  Thrombosis signs/symptoms: No  Thromboembolic event: No  Missed doses: No  Extra doses: No  Medication changes: No  Dietary changes: No  Change in health: No  Change in activity: No  Alcohol: No  Other concerns: No    Upcoming Procedures:  Does the Patient Have any upcoming procedures that require interruption in anticoagulation therapy? no  Does the patient require bridging? no      Anticoagulation Summary  As of 3/12/2025      INR goal:  2.0-3.0   TTR:  72.7% (1.5 y)   INR used for dosing:  3.70 (3/12/2025)   Weekly warfarin total:  42.5 mg               Assessment/Plan   Supratherapeutic     1. New dose: Pt continues taking Tylenol for leg pain after falling 2 weeks ago. Patient reports taking anywhere from 2-6 tablets per day depending on her pain.  Will hold today's dose, decrease weekly dose and patient will retest in one week.     2. Next INR: 1 week      Education provided to patient during the visit:  Patient instructed to call in interim with questions, concerns and changes.

## 2025-03-19 ENCOUNTER — ANTICOAGULATION - WARFARIN VISIT (OUTPATIENT)
Dept: CARDIOLOGY | Facility: CLINIC | Age: 75
End: 2025-03-19

## 2025-03-19 ENCOUNTER — APPOINTMENT (OUTPATIENT)
Dept: PRIMARY CARE | Facility: CLINIC | Age: 75
End: 2025-03-19
Payer: MEDICARE

## 2025-03-19 VITALS — SYSTOLIC BLOOD PRESSURE: 118 MMHG | BODY MASS INDEX: 30.78 KG/M2 | HEIGHT: 66 IN | DIASTOLIC BLOOD PRESSURE: 64 MMHG

## 2025-03-19 DIAGNOSIS — F32.A DEPRESSION, UNSPECIFIED DEPRESSION TYPE: ICD-10-CM

## 2025-03-19 DIAGNOSIS — I10 BENIGN ESSENTIAL HYPERTENSION: ICD-10-CM

## 2025-03-19 DIAGNOSIS — Z00.00 ENCOUNTER FOR PREVENTIVE HEALTH EXAMINATION: ICD-10-CM

## 2025-03-19 DIAGNOSIS — Z00.00 HEALTH CARE MAINTENANCE: ICD-10-CM

## 2025-03-19 DIAGNOSIS — R35.0 URINARY FREQUENCY: ICD-10-CM

## 2025-03-19 DIAGNOSIS — E11.8 CONTROLLED TYPE 2 DIABETES MELLITUS WITH COMPLICATION, WITHOUT LONG-TERM CURRENT USE OF INSULIN (MULTI): ICD-10-CM

## 2025-03-19 DIAGNOSIS — Z00.00 MEDICARE ANNUAL WELLNESS VISIT, SUBSEQUENT: ICD-10-CM

## 2025-03-19 DIAGNOSIS — E21.3 HYPERPARATHYROIDISM (MULTI): ICD-10-CM

## 2025-03-19 DIAGNOSIS — I48.0 PAROXYSMAL A-FIB (MULTI): ICD-10-CM

## 2025-03-19 DIAGNOSIS — I48.11 LONGSTANDING PERSISTENT ATRIAL FIBRILLATION (MULTI): ICD-10-CM

## 2025-03-19 DIAGNOSIS — F41.9 ANXIETY: ICD-10-CM

## 2025-03-19 DIAGNOSIS — M79.605 PAIN OF LEFT LOWER EXTREMITY: ICD-10-CM

## 2025-03-19 DIAGNOSIS — I48.91 ATRIAL FIBRILLATION, UNSPECIFIED TYPE (MULTI): ICD-10-CM

## 2025-03-19 DIAGNOSIS — Z00.00 ROUTINE GENERAL MEDICAL EXAMINATION AT HEALTH CARE FACILITY: Primary | ICD-10-CM

## 2025-03-19 DIAGNOSIS — Z95.2 S/P AVR (AORTIC VALVE REPLACEMENT): Primary | ICD-10-CM

## 2025-03-19 DIAGNOSIS — Z13.220 LIPID SCREENING: ICD-10-CM

## 2025-03-19 LAB
INR IN PPP BY COAGULATION ASSAY EXTERNAL: 3
PROTHROMBIN TIME (PT) IN PPP BY COAGULATION ASSAY EXTERNAL: NORMAL

## 2025-03-19 PROCEDURE — 1123F ACP DISCUSS/DSCN MKR DOCD: CPT | Performed by: STUDENT IN AN ORGANIZED HEALTH CARE EDUCATION/TRAINING PROGRAM

## 2025-03-19 PROCEDURE — 3078F DIAST BP <80 MM HG: CPT | Performed by: STUDENT IN AN ORGANIZED HEALTH CARE EDUCATION/TRAINING PROGRAM

## 2025-03-19 PROCEDURE — 99214 OFFICE O/P EST MOD 30 MIN: CPT | Performed by: STUDENT IN AN ORGANIZED HEALTH CARE EDUCATION/TRAINING PROGRAM

## 2025-03-19 PROCEDURE — 4010F ACE/ARB THERAPY RXD/TAKEN: CPT | Performed by: STUDENT IN AN ORGANIZED HEALTH CARE EDUCATION/TRAINING PROGRAM

## 2025-03-19 PROCEDURE — 3074F SYST BP LT 130 MM HG: CPT | Performed by: STUDENT IN AN ORGANIZED HEALTH CARE EDUCATION/TRAINING PROGRAM

## 2025-03-19 PROCEDURE — G0439 PPPS, SUBSEQ VISIT: HCPCS | Performed by: STUDENT IN AN ORGANIZED HEALTH CARE EDUCATION/TRAINING PROGRAM

## 2025-03-19 PROCEDURE — 1170F FXNL STATUS ASSESSED: CPT | Performed by: STUDENT IN AN ORGANIZED HEALTH CARE EDUCATION/TRAINING PROGRAM

## 2025-03-19 PROCEDURE — 1036F TOBACCO NON-USER: CPT | Performed by: STUDENT IN AN ORGANIZED HEALTH CARE EDUCATION/TRAINING PROGRAM

## 2025-03-19 PROCEDURE — 1159F MED LIST DOCD IN RCRD: CPT | Performed by: STUDENT IN AN ORGANIZED HEALTH CARE EDUCATION/TRAINING PROGRAM

## 2025-03-19 PROCEDURE — 99397 PER PM REEVAL EST PAT 65+ YR: CPT | Performed by: STUDENT IN AN ORGANIZED HEALTH CARE EDUCATION/TRAINING PROGRAM

## 2025-03-19 PROCEDURE — 1160F RVW MEDS BY RX/DR IN RCRD: CPT | Performed by: STUDENT IN AN ORGANIZED HEALTH CARE EDUCATION/TRAINING PROGRAM

## 2025-03-19 RX ORDER — ESCITALOPRAM OXALATE 20 MG/1
20 TABLET ORAL DAILY
Qty: 90 TABLET | Refills: 1 | Status: SHIPPED | OUTPATIENT
Start: 2025-03-19

## 2025-03-19 RX ORDER — WARFARIN SODIUM 5 MG/1
5 TABLET ORAL NIGHTLY
Qty: 90 TABLET | Refills: 1 | Status: SHIPPED | OUTPATIENT
Start: 2025-03-19

## 2025-03-19 RX ORDER — WARFARIN 2.5 MG/1
TABLET ORAL
Qty: 90 TABLET | Refills: 1 | Status: SHIPPED | OUTPATIENT
Start: 2025-03-19

## 2025-03-19 RX ORDER — OXYBUTYNIN CHLORIDE 5 MG/1
5 TABLET, EXTENDED RELEASE ORAL DAILY
Qty: 90 TABLET | Refills: 1 | Status: SHIPPED | OUTPATIENT
Start: 2025-03-19

## 2025-03-19 RX ORDER — GABAPENTIN 100 MG/1
100 CAPSULE ORAL 3 TIMES DAILY
Qty: 90 CAPSULE | Refills: 2 | Status: SHIPPED | OUTPATIENT
Start: 2025-03-19 | End: 2025-09-15

## 2025-03-19 ASSESSMENT — ACTIVITIES OF DAILY LIVING (ADL)
DRESSING: INDEPENDENT
MANAGING_FINANCES: INDEPENDENT
TAKING_MEDICATION: INDEPENDENT
GROCERY_SHOPPING: INDEPENDENT
BATHING: INDEPENDENT
DOING_HOUSEWORK: INDEPENDENT

## 2025-03-19 ASSESSMENT — ENCOUNTER SYMPTOMS
OCCASIONAL FEELINGS OF UNSTEADINESS: 1
LOSS OF SENSATION IN FEET: 0
DEPRESSION: 0

## 2025-03-19 NOTE — PROGRESS NOTES
Patient identification verified with 2 identifiers.    Location: [facility] Patient Self-Testing Program [phone]    Referring Physician: Silas Lawson MD   Enrollment/ Re-enrollment date: 8/26/2025   INR Goal: 2.0-3.0  INR monitoring is per [facility] protocol.  Anticoagulation Medication: warfarin  Indication: Aortic Valve Replacement    Subjective   Bleeding signs/symptoms: No    Bruising: No   Major bleeding event: No  Thrombosis signs/symptoms: No  Thromboembolic event: No  Missed doses: No  Extra doses: No  Medication changes: No  Dietary changes: No  Change in health: No  Change in activity: No  Alcohol: No  Other concerns: No    Upcoming Procedures:  Does the Patient Have any upcoming procedures that require interruption in anticoagulation therapy? no  Does the patient require bridging? no      Anticoagulation Summary  As of 3/19/2025      INR goal:  2.0-3.0   TTR:  71.7% (1.5 y)   INR used for dosing:  3.00 (3/19/2025)   Weekly warfarin total:  42.5 mg               Assessment/Plan   Therapeutic     1. New dose: Spoke to patient with dosing instructions and next testing date.    2. Next INR: 1 week      Education provided to patient during the visit:  Patient instructed to call in interim with questions, concerns and changes.

## 2025-03-19 NOTE — PROGRESS NOTES
"Subjective   Patient ID: Mario Alberto Pena is a 74 y.o. female who presents for Medicare Annual Wellness Visit Subsequent (Fell in Feb, still in pain).    [facility]   Routine fu, Wellness exam, and yearly physical.    Accompanied by her son.    She had a fall about 3 weeks ago, was evaluated in ED. Xrays of hip and left femur did not show a fracture. Pain persists, but is improving. She is ambulating with a walker.     Review of Systems  12-point ROS reviewed and was negative unless otherwise noted in HPI.    Objective   /64   Ht 1.676 m (5' 6\")   BMI 30.78 kg/m²     Physical Exam  GEN: conversant, NAD  HEENT: PERRL, EOMI, MMM  NECK: supple  CV: S1, S2, RRR  PULM: CTAB  ABD: soft, NT, ND  NEURO: no new gross focal deficits  EXT: no sig LE edema, No TTP of left leg or hip  PSYCH: appropriate affect    Assessment/Plan     #Leg/hip pain: left. Xrays from ED reviewed. Start gabapentin, discussed SE profile.    #Anxiety and depression: continue escitalopram. Offered emotional support.     #Hypertension: continue amlodipine 10mg daily, losartan     #hypercalcemia/primary hyperparathyroidism: seeing endocrinology, endocrine surgery evaluated the patient, no indication for surgical intervention at this time, per their report. Advised to continue off Calcium/vitamin D supplementation and follow up with Endocrinology. Pt to also follow up with genetics.     #Prosthetic valve endocarditis:   #paroxysmal AFib  -s/p surgical repeat AVR (2020). Follows with Cardiology.   -Continue warfarin. Interval specialist notes reviewed. Checking her INR at home  -gets prophylaxis for dental procedures      #CAD s/p NSTEMI: stent placed to LAD 2019. Follows with Cardiology     #DM2: Discussed lifestyle modifications. Continue diet and exercise and weight loss. Seeing endocrinology.      #h/o Stroke: Continue statin. Continue warfarin. Follows with Neurology.      #HLD: Currently on atorvastatin. Continue fish oil.     #Health maintenance: "   - Mammogram done 6/2024  - Colonoscopy and EGD 11/2019, repeat recommended for 2024, she declines for now pending 's medical issues.   - Advised Shingrix.   - Advised yearly flu shot  - Advised GYN eval.   - Seeing ophthalmology and dentist annually   - advised to get Prevnar 20.  - Received COVID vaccines, booster  -Yearly flu shots advised.  - TDaP given in 2023   - Longitudinal care.     RTC 3-4 mo

## 2025-03-20 LAB
ALBUMIN SERPL-MCNC: 4.3 G/DL (ref 3.6–5.1)
ALP SERPL-CCNC: 90 U/L (ref 37–153)
ALT SERPL-CCNC: 22 U/L (ref 6–29)
ANION GAP SERPL CALCULATED.4IONS-SCNC: 7 MMOL/L (CALC) (ref 7–17)
AST SERPL-CCNC: 20 U/L (ref 10–35)
BILIRUB SERPL-MCNC: 0.6 MG/DL (ref 0.2–1.2)
BUN SERPL-MCNC: 17 MG/DL (ref 7–25)
CALCIUM SERPL-MCNC: 11.2 MG/DL (ref 8.6–10.4)
CHLORIDE SERPL-SCNC: 106 MMOL/L (ref 98–110)
CHOLEST SERPL-MCNC: 128 MG/DL
CHOLEST/HDLC SERPL: 2.2 (CALC)
CO2 SERPL-SCNC: 27 MMOL/L (ref 20–32)
CREAT SERPL-MCNC: 0.9 MG/DL (ref 0.6–1)
EGFRCR SERPLBLD CKD-EPI 2021: 67 ML/MIN/1.73M2
ERYTHROCYTE [DISTWIDTH] IN BLOOD BY AUTOMATED COUNT: 13.3 % (ref 11–15)
EST. AVERAGE GLUCOSE BLD GHB EST-MCNC: 128 MG/DL
EST. AVERAGE GLUCOSE BLD GHB EST-SCNC: 7.1 MMOL/L
GLUCOSE SERPL-MCNC: 113 MG/DL (ref 65–99)
HBA1C MFR BLD: 6.1 % OF TOTAL HGB
HCT VFR BLD AUTO: 38.2 % (ref 35–45)
HDLC SERPL-MCNC: 59 MG/DL
HGB BLD-MCNC: 12.8 G/DL (ref 11.7–15.5)
LDLC SERPL CALC-MCNC: 48 MG/DL (CALC)
MCH RBC QN AUTO: 29.6 PG (ref 27–33)
MCHC RBC AUTO-ENTMCNC: 33.5 G/DL (ref 32–36)
MCV RBC AUTO: 88.4 FL (ref 80–100)
NONHDLC SERPL-MCNC: 69 MG/DL (CALC)
PLATELET # BLD AUTO: 204 THOUSAND/UL (ref 140–400)
PMV BLD REES-ECKER: 9.4 FL (ref 7.5–12.5)
POTASSIUM SERPL-SCNC: 4.2 MMOL/L (ref 3.5–5.3)
PROT SERPL-MCNC: 6.6 G/DL (ref 6.1–8.1)
RBC # BLD AUTO: 4.32 MILLION/UL (ref 3.8–5.1)
SODIUM SERPL-SCNC: 140 MMOL/L (ref 135–146)
TRIGL SERPL-MCNC: 120 MG/DL
TSH SERPL-ACNC: 1.52 MIU/L (ref 0.4–4.5)
WBC # BLD AUTO: 7.2 THOUSAND/UL (ref 3.8–10.8)

## 2025-03-26 ENCOUNTER — ANTICOAGULATION - WARFARIN VISIT (OUTPATIENT)
Dept: CARDIOLOGY | Facility: CLINIC | Age: 75
End: 2025-03-26
Payer: MEDICARE

## 2025-03-26 DIAGNOSIS — Z95.2 S/P AVR (AORTIC VALVE REPLACEMENT): Primary | ICD-10-CM

## 2025-03-26 DIAGNOSIS — I48.11 LONGSTANDING PERSISTENT ATRIAL FIBRILLATION (MULTI): ICD-10-CM

## 2025-03-26 LAB
INR IN PPP BY COAGULATION ASSAY EXTERNAL: 4.4
PROTHROMBIN TIME (PT) IN PPP BY COAGULATION ASSAY EXTERNAL: NORMAL

## 2025-03-26 NOTE — PROGRESS NOTES
Patient identification verified with 2 identifiers.    Location: [facility] Patient Self-Testing Program [phone]    Referring Physician: Silas Lawson MD   Enrollment/ Re-enrollment date: 2025   INR Goal: 2.0-3.0  INR monitoring is per [facility] protocol.  Anticoagulation Medication: warfarin  Indication: Aortic Valve Replacement    Subjective   Bleeding signs/symptoms: No    Bruising: No   Major bleeding event: No  Thrombosis signs/symptoms: No  Thromboembolic event: No  Missed doses: No  Extra doses: No  Medication changes: No  Dietary changes: No  Change in health: No  Change in activity: No  Alcohol: No  Other concerns: No    Upcoming Procedures:  Does the Patient Have any upcoming procedures that require interruption in anticoagulation therapy? no  Does the patient require bridging? no      Anticoagulation Summary  As of 3/26/2025      INR goal:  2.0-3.0   TTR:  70.8% (1.5 y)   INR used for dosin.40 (3/26/2025)   Weekly warfarin total:  40 mg               Assessment/Plan   Supratherapeutic     1. New dose: Spoke to patient with dosing instructions and next testing date.  Will hold 2 days, decrease weekly dose and patient will retest in 1 week.    2. Next INR: 1 week      Education provided to patient during the visit:  Patient instructed to call in interim with questions, concerns and changes.

## 2025-04-01 ENCOUNTER — APPOINTMENT (OUTPATIENT)
Dept: PHARMACY | Facility: HOSPITAL | Age: 75
End: 2025-04-01
Payer: MEDICARE

## 2025-04-01 DIAGNOSIS — I25.10 CORONARY ARTERY DISEASE INVOLVING NATIVE HEART WITHOUT ANGINA PECTORIS, UNSPECIFIED VESSEL OR LESION TYPE: ICD-10-CM

## 2025-04-01 DIAGNOSIS — E66.9 OBESITY (BMI 30-39.9): ICD-10-CM

## 2025-04-01 PROCEDURE — RXMED WILLOW AMBULATORY MEDICATION CHARGE

## 2025-04-01 RX ORDER — SEMAGLUTIDE 1 MG/.5ML
1 INJECTION, SOLUTION SUBCUTANEOUS
Qty: 2 ML | Refills: 1 | Status: SHIPPED | OUTPATIENT
Start: 2025-04-06

## 2025-04-01 NOTE — PROGRESS NOTES
Pharmacist Clinic: Cardiology Management    Mario Alberto Pena is a 74 y.o. female was referred to Clinical Pharmacy Team for Weight Loss management.     Referring Provider: Shanique Longo APRN*    THIS IS A FOLLOW UP PATIENT APPOINTMENT. AT LAST VISIT ON 2/18/25 WITH PHARMACIST (Himanshu Wiggins).    Appointment was completed by Mario Alberto who was reached at primary number.    REVIEW OF PAST APPNT (IF APPLICABLE):   During last appointment Viveca was increased to Wegovy 0.5mg weekly dose.  Noted at last appointment having some nausea when starting the medication.      Allergies   Allergen Reactions    Heparin Other     Low Platelet Count    Lisinopril Cough    Penicillins Unknown       Past Medical History:   Diagnosis Date    Allergic contact dermatitis due to plants, except food 08/24/2018    Poison ivy    Cerebrovascular disease, unspecified 03/13/2015    Ill-defined cerebrovascular disease    Encounter for follow-up examination after completed treatment for conditions other than malignant neoplasm 11/25/2019    Hospital discharge follow-up    Encounter for screening mammogram for malignant neoplasm of breast 09/21/2016    Encounter for mammogram to establish baseline mammogram    Hyperlipidemia, unspecified 03/13/2015    Dyslipidemia    Low back pain, unspecified 10/09/2019    Acute exacerbation of chronic low back pain    Low back pain, unspecified 10/09/2019    Acute exacerbation of chronic low back pain    Low back pain, unspecified 07/25/2017    Acute exacerbation of chronic low back pain    Other specified postprocedural states     H/O colonoscopy    Personal history of other diseases of the musculoskeletal system and connective tissue 04/05/2018    History of muscle spasm    Personal history of other diseases of urinary system 03/15/2018    History of hematuria    Personal history of other medical treatment     H/O bone density study    Personal history of other medical treatment     History of mammogram     Personal history of other specified conditions 05/02/2020    History of nasal congestion    Personal history of other specified conditions 11/25/2019    History of bacteremia    Rash and other nonspecific skin eruption 03/28/2016    Skin rash    Strain of muscle, fascia and tendon of lower back, initial encounter 04/06/2016    Lumbar strain    Urinary tract infection, site not specified 04/05/2018    Acute UTI    Vitamin D deficiency, unspecified 02/08/2016    Vitamin D deficiency       Current Outpatient Medications on File Prior to Visit   Medication Sig Dispense Refill    acetaminophen (Tylenol Extra Strength) 500 mg tablet Take 2 tablets (1,000 mg) by mouth every 6 hours if needed.      albuterol 90 mcg/actuation inhaler Inhale 2 puffs every 4 hours if needed.      amLODIPine (Norvasc) 10 mg tablet Take 1 tablet (10 mg) by mouth once daily. 90 tablet 3    aspirin (Aspirin Childrens) 81 mg chewable tablet Chew 1 tablet (81 mg) once daily.      clindamycin (Cleocin) 300 mg capsule Take 2 capsules (600 mg) by mouth 1 time. 60 MINUTES PRIOR TO DENTAL CLEANINGS AND PROCEDURES      docusate sodium (Colace) 100 mg capsule Take 1 capsule (100 mg) by mouth 2 times a day as needed for constipation (May buy over the counter.).      escitalopram (Lexapro) 20 mg tablet Take 1 tablet (20 mg) by mouth once daily. 90 tablet 1    folic acid (Folvite) 1 mg tablet Take 1 tablet (1 mg) by mouth once daily.      gabapentin (Neurontin) 100 mg capsule Take 1 capsule (100 mg) by mouth 3 times a day. 90 capsule 2    glucos sul 2KCl/msm/chond/C/Mn (GLUCOSAMINE CHONDROITIN ORAL) Take 1 tablet by mouth once daily. Glucosamine and Chondroit-MV-Min3 713-316-28-0.5 mg ORAL Tab      ipratropium-albuteroL (Duo-Neb) 0.5-2.5 mg/3 mL nebulizer solution Inhale 3 mL every 2 hours if needed for shortness of breath.      lidocaine (LMX) 4 % cream Apply topically 4 times a day as needed for mild pain (1 - 3).      losartan (Cozaar) 25 mg tablet Take  "1 tablet (25 mg) by mouth once daily. 90 tablet 3    metoprolol tartrate (Lopressor) 25 mg tablet Take 0.5 tablets (12.5 mg) by mouth every 12 hours. 90 tablet 3    multivitamin-min-iron-FA-vit K (Adults Multivitamin) 18 mg iron-400 mcg-25 mcg tablet Take 1 tablet by mouth once daily.      oxybutynin XL (Ditropan-XL) 5 mg 24 hr tablet Take 1 tablet (5 mg) by mouth once daily. 90 tablet 1    oxygen (O2) therapy 3 L continuously.      pantoprazole (ProtoNix) 40 mg EC tablet Take 1 tablet (40 mg) by mouth once daily in the morning. Take before meals. 90 tablet 3    rosuvastatin (Crestor) 40 mg tablet Take 1 tablet (40 mg) by mouth once daily at bedtime. 90 tablet 3    warfarin (Coumadin) 10 mg tablet Take 1 tablet (10 mg) by mouth once daily.      warfarin (Coumadin) 2.5 mg tablet 1 tab qd 90 tablet 1    warfarin (Coumadin) 5 mg tablet Take 1 tablet (5 mg) by mouth once daily at bedtime. Daily as directed 90 tablet 1    [DISCONTINUED] semaglutide, weight loss, (Wegovy) 0.5 mg/0.5 mL pen injector Inject 0.5 mg under the skin 1 (one) time per week. 2 mL 0     No current facility-administered medications on file prior to visit.         RELEVANT LAB RESULTS:  Lab Results   Component Value Date    BILITOT 0.6 03/19/2025    CALCIUM 11.2 (H) 03/19/2025    CO2 27 03/19/2025     03/19/2025    CREATININE 0.90 03/19/2025    GLUCOSE 113 (H) 03/19/2025    ALKPHOS 90 03/19/2025    K 4.2 03/19/2025    PROT 6.6 03/19/2025     03/19/2025    AST 20 03/19/2025    ALT 22 03/19/2025    BUN 17 03/19/2025    ANIONGAP 7 03/19/2025    MG 1.81 07/12/2023    PHOS 2.9 07/12/2023     11/02/2020    ALBUMIN 4.3 03/19/2025    GFRF 52 (A) 07/12/2023     Lab Results   Component Value Date    TRIG 120 03/19/2025    CHOL 128 03/19/2025    LDLCALC 48 03/19/2025    HDL 59 03/19/2025     No results found for: \"BMCBC\", \"CBCDIF\"     PHARMACEUTICAL ASSESSMENT:    MEDICATION RECONCILIATION    Was a medication reconciliation completed at this " visit? No    Drug Interactions? No    Medication Documentation Review Audit       Reviewed by Jigar Hurst MD (Physician) on 03/19/25 at 1403      Medication Order Taking? Sig Documenting Provider Last Dose Status   acetaminophen (Tylenol Extra Strength) 500 mg tablet [id number] No Take 2 tablets (1,000 mg) by mouth every 6 hours if needed. Pam Macias MD Taking Active   albuterol 90 mcg/actuation inhaler [id number] No Inhale 2 puffs every 4 hours if needed. Pam Macias MD Taking Active   amLODIPine (Norvasc) 10 mg tablet [id number] No Take 1 tablet (10 mg) by mouth once daily. Silas Lawson MD Taking Active   aspirin (Aspirin Childrens) 81 mg chewable tablet [id number]  Chew 1 tablet (81 mg) once daily. Pam Macias MD  Active   clindamycin (Cleocin) 300 mg capsule [id number] No Take 2 capsules (600 mg) by mouth 1 time. 60 MINUTES PRIOR TO DENTAL CLEANINGS AND PROCEDURES Pam Macias MD Taking Active   docusate sodium (Colace) 100 mg capsule [id number] No Take 1 capsule (100 mg) by mouth 2 times a day as needed for constipation (May buy over the counter.). Pam Macias MD Taking Active     Discontinued 03/19/25 1358   escitalopram (Lexapro) 20 mg tablet [id number]  Take 1 tablet (20 mg) by mouth once daily. Jigar Hurst MD  Active   folic acid (Folvite) 1 mg tablet [id number] No Take 1 tablet (1 mg) by mouth once daily. Pam Macias MD Taking Active   gabapentin (Neurontin) 100 mg capsule [id number]  Take 1 capsule (100 mg) by mouth 3 times a day. Jigar Hurst MD  Active   glucos sul 2KCl/msm/chond/C/Mn (GLUCOSAMINE CHONDROITIN ORAL) [id number] No Take 1 tablet by mouth once daily. Glucosamine and Chondroit-MV-Min3 399-286-86-0.5 mg ORAL Tab Pam Macias MD Taking Active   ipratropium-albuteroL (Duo-Neb) 0.5-2.5 mg/3 mL nebulizer solution [id number] No Inhale 3 mL every 2 hours if needed for shortness of breath. Pam Macias MD Taking Active   lidocaine  (LMX) 4 % cream [id number] No Apply topically 4 times a day as needed for mild pain (1 - 3). Historical Provider, MD Taking Active   losartan (Cozaar) 25 mg tablet [id number]  Take 1 tablet (25 mg) by mouth once daily. Silas Lawson MD  Active   metoprolol tartrate (Lopressor) 25 mg tablet [id number]  Take 0.5 tablets (12.5 mg) by mouth every 12 hours. Silas Lawson MD  Active   multivitamin-min-iron-FA-vit K (Adults Multivitamin) 18 mg iron-400 mcg-25 mcg tablet [id number] No Take 1 tablet by mouth once daily. Historical Provider, MD Taking Active     Discontinued 03/19/25 1358   oxybutynin XL (Ditropan-XL) 5 mg 24 hr tablet [id number]  Take 1 tablet (5 mg) by mouth once daily. Jigar Hurst MD  Active   oxygen (O2) therapy [id number] No 3 L continuously. Historical Provider, MD Taking Active   pantoprazole (ProtoNix) 40 mg EC tablet [id number]  Take 1 tablet (40 mg) by mouth once daily in the morning. Take before meals. Silas Lawson MD  Active   rosuvastatin (Crestor) 40 mg tablet [id number]  Take 1 tablet (40 mg) by mouth once daily at bedtime. Silas Lawson MD  Active   semaglutide, weight loss, (Wegovy) 0.5 mg/0.5 mL pen injector [id number]  Inject 0.5 mg under the skin 1 (one) time per week. Shanique Longo, APRN-CNP  Active   warfarin (Coumadin) 10 mg tablet [id number] No Take 1 tablet (10 mg) by mouth once daily. Historical MD Gilberto Taking Active     Discontinued 03/19/25 1358   warfarin (Coumadin) 2.5 mg tablet [id number]  1 tab qd Jigar Hurst MD  Active     Discontinued 03/19/25 1358   warfarin (Coumadin) 5 mg tablet [id number]  Take 1 tablet (5 mg) by mouth once daily at bedtime. Daily as directed Jigar Hurst MD  Active                    DISEASE MANAGEMENT ASSESSMENT:     WEIGHT LOSS ASSESSMENT     Wt Readings from Last 3 Encounters:   12/19/24 86.5 kg (190 lb 11.2 oz)   09/17/24 84.8 kg (187 lb)   06/26/24 83.4 kg (183 lb 12.8 oz)     BMI Readings from Last 3 Encounters:   03/19/25 30.78  kg/m²   12/19/24 30.78 kg/m²   09/17/24 30.18 kg/m²       Recorded Home Weight(s): 181lbs  Diet: small breakfast every morning, always eat lunch, and will some days skip dinner   Feels that he appetite has decreased while she was using pain medication.  Exercise Routine: only able to walk short distances due to fall in February    CURRENT PHARMACOTHERAPY   Wegovy 0.5mg weekly     Affordability/Accessibility: reports medication is affordable  Adherence/Organization: reports adherence Sunday  Adverse Reactions: none reported    Relevant PMH:  PMH of Pancreatitis? No  PMH of Retinopathy? No  PMH of MTC? No    COUNSELING:   - Counseled patient on MOA, expectations, side effects, duration of therapy, contraindications, administration, and monitoring parameters  - Answered all patient questions and concerns; provided [facility] phone number if issues/questions arise       DISCUSSION/NOTES:   Since fall in February she has been having troubles with walking. Using walker to move around.  Expects her bruising and pain in leg and hip to resolve during this month.    ASSESSMENT:    Assessment/Plan   Problem List Items Addressed This Visit       CAD (coronary artery disease)     No worsening of side effects since increasing Wegovy dose. Continue to increase to max tolerated dose.         Relevant Medications    semaglutide, weight loss, (Wegovy) 1 mg/0.5 mL pen injector (Start on 4/6/2025)    Other Relevant Orders    Referral to Clinical Pharmacy     Other Visit Diagnoses       Obesity (BMI 30-39.9)        Relevant Medications    semaglutide, weight loss, (Wegovy) 1 mg/0.5 mL pen injector (Start on 4/6/2025)    Other Relevant Orders    Referral to Clinical Pharmacy              RECOMMENDATIONS/PLAN:    INCREASE  Wegovy 1mg weekly    Last Appnt with Referring Provider: 12/19/24  Next Appnt with Referring Provider: 6/24/25  Clinical Pharmacist follow up: 4/29/25  VAF/Application Expiration: No  Type of Encounter: [facility]SON[facility]  Cassy Wiggins    Verbal consent to manage patient's drug therapy was obtained from the patient . They were informed they may decline to participate or withdraw from participation in pharmacy services at any time.    Continue all meds under the continuation of care with the referring provider and clinical pharmacy team.

## 2025-04-01 NOTE — ASSESSMENT & PLAN NOTE
No worsening of side effects since increasing Wegovy dose. Continue to increase to max tolerated dose.

## 2025-04-02 ENCOUNTER — ANTICOAGULATION - WARFARIN VISIT (OUTPATIENT)
Dept: CARDIOLOGY | Facility: CLINIC | Age: 75
End: 2025-04-02
Payer: MEDICARE

## 2025-04-02 DIAGNOSIS — Z95.2 S/P AVR (AORTIC VALVE REPLACEMENT): Primary | ICD-10-CM

## 2025-04-02 DIAGNOSIS — I48.11 LONGSTANDING PERSISTENT ATRIAL FIBRILLATION (MULTI): ICD-10-CM

## 2025-04-02 NOTE — PROGRESS NOTES
Patient identification verified with 2 identifiers.    Location: [facility] Patient Self-Testing Program [phone]    Referring Physician: DR. NAVEED JAIN  Enrollment/ Re-enrollment date: 2025   INR Goal: 2.0-3.0  INR monitoring is per [facility] protocol.  Anticoagulation Medication: warfarin  Indication: Aortic Valve Replacement    Subjective   Bleeding signs/symptoms: No    Bruising: No   Major bleeding event: No  Thrombosis signs/symptoms: No  Thromboembolic event: No  Missed doses: No  Extra doses: No  Medication changes: Yes  PT STILL TAKING GABAPENTIN  Dietary changes: No  Change in health: No  Change in activity: No  Alcohol: No  Other concerns: No    Upcoming Procedures:  Does the Patient Have any upcoming procedures that require interruption in anticoagulation therapy? no  Does the patient require bridging? no      Anticoagulation Summary  As of 2025      INR goal:  2.0-3.0   TTR:  70.4% (1.5 y)   INR used for dosin.80 (2025)   Weekly warfarin total:  40 mg               Assessment/Plan   Subtherapeutic     1. New dose:  NO CHANGE. SPOKE TO PT. CONFIRMED CURRENT DOSING INSTRUCTIONS     2. Next INR: 1 week      Education provided to patient during the visit:  Patient instructed to call in interim with questions, concerns and changes.   Patient educated on compliance with dosing, follow up appointments, and prescribed plan of care.

## 2025-04-09 ENCOUNTER — ANTICOAGULATION - WARFARIN VISIT (OUTPATIENT)
Dept: CARDIOLOGY | Facility: CLINIC | Age: 75
End: 2025-04-09
Payer: MEDICARE

## 2025-04-09 DIAGNOSIS — Z95.2 S/P AVR (AORTIC VALVE REPLACEMENT): Primary | ICD-10-CM

## 2025-04-09 DIAGNOSIS — I48.11 LONGSTANDING PERSISTENT ATRIAL FIBRILLATION (MULTI): ICD-10-CM

## 2025-04-09 NOTE — PROGRESS NOTES
Patient identification verified with 2 identifiers.    Location: [facility] Patient Self-Testing Program [phone]    Referring Physician: DR. NAVEED JAIN  Enrollment/ Re-enrollment date: 2025   INR Goal: 2.0-3.0  INR monitoring is per [facility] protocol.  Anticoagulation Medication: warfarin  Indication: Aortic Valve Replacement    Subjective   Bleeding signs/symptoms: No    Bruising: No   Major bleeding event: No  Thrombosis signs/symptoms: No  Thromboembolic event: No  Missed doses: No  Extra doses: No  Medication changes: No  Dietary changes: No  Change in health: No  Change in activity: No  Alcohol: No  Other concerns: No    Upcoming Procedures:  Does the Patient Have any upcoming procedures that require interruption in anticoagulation therapy? no  Does the patient require bridging? no      Anticoagulation Summary  As of 2025      INR goal:  2.0-3.0   TTR:  70.3% (1.5 y)   INR used for dosin.40 (2025)   Weekly warfarin total:  40 mg               Assessment/Plan   Therapeutic     1. New dose: no change    2. Next INR: 1 week      Education provided to patient during the visit:  Patient instructed to call in interim with questions, concerns and changes.   Patient educated on compliance with dosing, follow up appointments, and prescribed plan of care.

## 2025-04-10 ENCOUNTER — PHARMACY VISIT (OUTPATIENT)
Dept: PHARMACY | Facility: CLINIC | Age: 75
End: 2025-04-10
Payer: MEDICARE

## 2025-04-16 ENCOUNTER — ANTICOAGULATION - WARFARIN VISIT (OUTPATIENT)
Dept: CARDIOLOGY | Facility: CLINIC | Age: 75
End: 2025-04-16
Payer: MEDICARE

## 2025-04-16 DIAGNOSIS — Z95.2 S/P AVR (AORTIC VALVE REPLACEMENT): Primary | ICD-10-CM

## 2025-04-16 DIAGNOSIS — I48.11 LONGSTANDING PERSISTENT ATRIAL FIBRILLATION (MULTI): ICD-10-CM

## 2025-04-16 LAB
INR IN PPP BY COAGULATION ASSAY EXTERNAL: 3.9
PROTHROMBIN TIME (PT) IN PPP BY COAGULATION ASSAY EXTERNAL: NORMAL

## 2025-04-16 NOTE — PROGRESS NOTES
Patient identification verified with 2 identifiers.    Location: [facility] Patient Self-Testing Program [phone]    Referring Physician: DR. NAVEED JAIN  Enrollment/ Re-enrollment date: 8/26/2025   INR Goal: 2.0-3.0  INR monitoring is per [facility] protocol.  Anticoagulation Medication: warfarin  Indication: Aortic Valve Replacement    Subjective   Bleeding signs/symptoms: No    Bruising: No   Major bleeding event: No  Thrombosis signs/symptoms: No  Thromboembolic event: No  Missed doses: No  Extra doses: No  Medication changes: Yes  Pt continues to take gabapentin 1-2x/day.  Her dose of Wegovy was increased on Sunday also.  Dietary changes: Yes  Pt reports that she didn't have as good of an appetite this week.  Change in health: No  Change in activity: No  Alcohol: No  Other concerns: No    Upcoming Procedures:  Does the Patient Have any upcoming procedures that require interruption in anticoagulation therapy? no  Does the patient require bridging? no    Dose maintained after last test on 4/9/25.  Pt is currently taking 40mg warfarin weekly.  Anticoagulation Summary  As of 4/16/2025      INR goal:  2.0-3.0   TTR:  70.0% (1.6 y)   INR used for dosing:  3.90 (4/16/2025)   Weekly warfarin total:  40 mg           Rec'd faxed INR results from self-test done on 4/16/25.  Called and spoke to the pt and confirmed ID with name and birthdate.  Pt confirmed correct weekly dose of warfarin and read it back to me.  Advised her to hold her warfarin today only 4/16/25 then reduce dose to 37.5mg warfarin weekly which she verbalized understanding of.  She will retest in 1 week on 4/23/25.    Assessment/Plan   Supratherapeutic     1. New dose:  Will have pt hold one dose of warfarin 4/16/25 then make a 6% dose reduction due to her tablet strength. Reduce from 40mg weekly to 37.5mg warfarin weekly.     2. Next INR: 1 week      Education provided to patient during the visit:  Patient instructed to call in interim with questions, concerns  and changes.   Patient educated on compliance with dosing, follow up appointments, and prescribed plan of care.

## 2025-04-23 ENCOUNTER — ANTICOAGULATION - WARFARIN VISIT (OUTPATIENT)
Dept: CARDIOLOGY | Facility: CLINIC | Age: 75
End: 2025-04-23
Payer: MEDICARE

## 2025-04-23 DIAGNOSIS — I48.11 LONGSTANDING PERSISTENT ATRIAL FIBRILLATION (MULTI): ICD-10-CM

## 2025-04-23 DIAGNOSIS — Z95.2 S/P AVR (AORTIC VALVE REPLACEMENT): Primary | ICD-10-CM

## 2025-04-23 LAB
INR IN PPP BY COAGULATION ASSAY EXTERNAL: 2.9
PROTHROMBIN TIME (PT) IN PPP BY COAGULATION ASSAY EXTERNAL: NORMAL

## 2025-04-29 ENCOUNTER — APPOINTMENT (OUTPATIENT)
Dept: PHARMACY | Facility: HOSPITAL | Age: 75
End: 2025-04-29
Payer: MEDICARE

## 2025-04-29 DIAGNOSIS — I25.10 CORONARY ARTERY DISEASE INVOLVING NATIVE HEART WITHOUT ANGINA PECTORIS, UNSPECIFIED VESSEL OR LESION TYPE: ICD-10-CM

## 2025-04-29 DIAGNOSIS — E66.9 OBESITY (BMI 30-39.9): ICD-10-CM

## 2025-04-29 PROCEDURE — RXMED WILLOW AMBULATORY MEDICATION CHARGE

## 2025-04-29 RX ORDER — SEMAGLUTIDE 1.7 MG/.75ML
1.7 INJECTION, SOLUTION SUBCUTANEOUS
Qty: 3 ML | Refills: 1 | Status: SHIPPED | OUTPATIENT
Start: 2025-05-04

## 2025-04-29 NOTE — PROGRESS NOTES
"  Pharmacist Clinic: Cardiology Management    Mario Alberto Pena is a 74 y.o. female was referred to Clinical Pharmacy Team for Weight Loss management.     Referring Provider: Shanique Longo APRN*    THIS IS A FOLLOW UP PATIENT APPOINTMENT. AT LAST VISIT ON 4/1/25 WITH PHARMACIST (Himanshu Wiggins).    Appointment was completed by Mario Alberto who was reached at primary number.    REVIEW OF PAST APPNT (IF APPLICABLE):   At last appointment Wegovy was increase to 1mg weekly dose.    Allergies[1]    Medical History[2]    Medications Ordered Prior to Encounter[3]      RELEVANT LAB RESULTS:  Lab Results   Component Value Date    BILITOT 0.6 03/19/2025    CALCIUM 11.2 (H) 03/19/2025    CO2 27 03/19/2025     03/19/2025    CREATININE 0.90 03/19/2025    GLUCOSE 113 (H) 03/19/2025    ALKPHOS 90 03/19/2025    K 4.2 03/19/2025    PROT 6.6 03/19/2025     03/19/2025    AST 20 03/19/2025    ALT 22 03/19/2025    BUN 17 03/19/2025    ANIONGAP 7 03/19/2025    MG 1.81 07/12/2023    PHOS 2.9 07/12/2023     11/02/2020    ALBUMIN 4.3 03/19/2025    GFRF 52 (A) 07/12/2023     Lab Results   Component Value Date    TRIG 120 03/19/2025    CHOL 128 03/19/2025    LDLCALC 48 03/19/2025    HDL 59 03/19/2025     No results found for: \"BMCBC\", \"CBCDIF\"     PHARMACEUTICAL ASSESSMENT:    MEDICATION RECONCILIATION    Was a medication reconciliation completed at this visit? No    Drug Interactions? No    Medication Documentation Review Audit       Reviewed by Jigar Hurst MD (Physician) on 03/19/25 at 1403      Medication Order Taking? Sig Documenting Provider Last Dose Status   acetaminophen (Tylenol Extra Strength) 500 mg tablet [id number] No Take 2 tablets (1,000 mg) by mouth every 6 hours if needed. Historical Provider, MD Taking Active   albuterol 90 mcg/actuation inhaler [id number] No Inhale 2 puffs every 4 hours if needed. Historical Provider, MD Taking Active   amLODIPine (Norvasc) 10 mg tablet [id number] No Take 1 tablet (10 mg) " by mouth once daily. Silas Lawson MD Taking Active   aspirin (Aspirin Childrens) 81 mg chewable tablet [id number]  Chew 1 tablet (81 mg) once daily. Pam Macias MD  Active   clindamycin (Cleocin) 300 mg capsule [id number] No Take 2 capsules (600 mg) by mouth 1 time. 60 MINUTES PRIOR TO DENTAL CLEANINGS AND PROCEDURES Pam Macias MD Taking Active   docusate sodium (Colace) 100 mg capsule [id number] No Take 1 capsule (100 mg) by mouth 2 times a day as needed for constipation (May buy over the counter.). Pam Macias MD Taking Active     Discontinued 03/19/25 1358   escitalopram (Lexapro) 20 mg tablet [id number]  Take 1 tablet (20 mg) by mouth once daily. Jigar Hurst MD  Active   folic acid (Folvite) 1 mg tablet [id number] No Take 1 tablet (1 mg) by mouth once daily. Pam Macias MD Taking Active   gabapentin (Neurontin) 100 mg capsule [id number]  Take 1 capsule (100 mg) by mouth 3 times a day. Jigar Hurst MD  Active   glucos sul 2KCl/msm/chond/C/Mn (GLUCOSAMINE CHONDROITIN ORAL) [id number] No Take 1 tablet by mouth once daily. Glucosamine and Chondroit-MV-Min3 132-778-12-0.5 mg ORAL Tab Pam Macias MD Taking Active   ipratropium-albuteroL (Duo-Neb) 0.5-2.5 mg/3 mL nebulizer solution [id number] No Inhale 3 mL every 2 hours if needed for shortness of breath. Pam Macias MD Taking Active   lidocaine (LMX) 4 % cream [id number] No Apply topically 4 times a day as needed for mild pain (1 - 3). Pam Macias MD Taking Active   losartan (Cozaar) 25 mg tablet [id number]  Take 1 tablet (25 mg) by mouth once daily. Silas Lawson MD  Active   metoprolol tartrate (Lopressor) 25 mg tablet [id number]  Take 0.5 tablets (12.5 mg) by mouth every 12 hours. Silas Lawson MD  Active   multivitamin-min-iron-FA-vit K (Adults Multivitamin) 18 mg iron-400 mcg-25 mcg tablet [id number] No Take 1 tablet by mouth once daily. Pam Macias MD Taking Active     Discontinued 03/19/25  1358   oxybutynin XL (Ditropan-XL) 5 mg 24 hr tablet [id number]  Take 1 tablet (5 mg) by mouth once daily. Jigar Hurst MD  Active   oxygen (O2) therapy [id number] No 3 L continuously. Historical Provider, MD Taking Active   pantoprazole (ProtoNix) 40 mg EC tablet [id number]  Take 1 tablet (40 mg) by mouth once daily in the morning. Take before meals. Silas Lawson MD  Active   rosuvastatin (Crestor) 40 mg tablet [id number]  Take 1 tablet (40 mg) by mouth once daily at bedtime. Silas Lawson MD  Active   semaglutide, weight loss, (Wegovy) 0.5 mg/0.5 mL pen injector [id number]  Inject 0.5 mg under the skin 1 (one) time per week. Shanique Longo, APRN-CNP  Active   warfarin (Coumadin) 10 mg tablet [id number] No Take 1 tablet (10 mg) by mouth once daily. Historical Provider, MD Taking Active     Discontinued 03/19/25 1358   warfarin (Coumadin) 2.5 mg tablet [id number]  1 tab qd Jigar Hurst MD  Active     Discontinued 03/19/25 1358   warfarin (Coumadin) 5 mg tablet [id number]  Take 1 tablet (5 mg) by mouth once daily at bedtime. Daily as directed Jigar Hurst MD  Active                    DISEASE MANAGEMENT ASSESSMENT:     WEIGHT LOSS ASSESSMENT     Wt Readings from Last 3 Encounters:   12/19/24 86.5 kg (190 lb 11.2 oz)   09/17/24 84.8 kg (187 lb)   06/26/24 83.4 kg (183 lb 12.8 oz)     BMI Readings from Last 3 Encounters:   03/19/25 30.78 kg/m²   12/19/24 30.78 kg/m²   09/17/24 30.18 kg/m²       Recorded Home Weight(s): 179lbs  Diet: small breakfast every morning, always eat lunch, and will some days skip dinner   Feels that he appetite has decreased while she was using pain medication.  Potion sizes are continuing to decrease.  Exercise Routine: only able to walk short distances due to fall in February  Balance has improved and started to exercise again  Additional Contributory Factors: recovering from fall in February.    CURRENT PHARMACOTHERAPY   Wegovy 1mg weekly     Affordability/Accessibility: reports  medication is affordable  Adherence/Organization: reports adherence - Sundays  Adverse Reactions: some constipation    Relevant PMH:  PMH of Pancreatitis? no  PMH of Retinopathy? no  PMH of MTC? No    COUNSELING:   - Counseled patient on MOA, expectations, side effects, duration of therapy, contraindications, administration, and monitoring parameters  - Answered all patient questions and concerns; provided [facility] phone number if issues/questions arise       DISCUSSION/NOTES:   Reports adherence to Wegovy. Continuing to see weight loss on the medication.  Notes that she is feeling stronger since her last fall and her balance has improved. Started to exercise more as she is not having as much pain as she was having in the past.  Continuing to eat mainly 2 meals a day.    ASSESSMENT:    Assessment/Plan   Problem List Items Addressed This Visit       CAD (coronary artery disease)    No worsening of side effects since increasing Wegovy dose. Continue to increase to max tolerated dose.          Relevant Medications    semaglutide, weight loss, (Wegovy) 1.7 mg/0.75 mL pen injector (Start on 5/4/2025)    Other Relevant Orders    Referral to Clinical Pharmacy     Other Visit Diagnoses         Obesity (BMI 30-39.9)        Relevant Medications    semaglutide, weight loss, (Wegovy) 1.7 mg/0.75 mL pen injector (Start on 5/4/2025)    Other Relevant Orders    Referral to Clinical Pharmacy              RECOMMENDATIONS/PLAN:    INCREASE  Wegovy 1.7mg weekly    Last Appnt with Referring Provider: 12/19/24  Next Appnt with Referring Provider: 6/24/25  Clinical Pharmacist follow up: 5/27/25  VAF/Application Expiration: No  Type of Encounter: Bang Wiggins PharmD    Verbal consent to manage patient's drug therapy was obtained from the patient . They were informed they may decline to participate or withdraw from participation in pharmacy services at any time.    Continue all meds under the continuation of care with the  referring provider and clinical pharmacy team.            [1]   Allergies  Allergen Reactions    Heparin Other     Low Platelet Count    Lisinopril Cough    Penicillins Unknown   [2]   Past Medical History:  Diagnosis Date    Allergic contact dermatitis due to plants, except food 08/24/2018    Poison ivy    Cerebrovascular disease, unspecified 03/13/2015    Ill-defined cerebrovascular disease    Encounter for follow-up examination after completed treatment for conditions other than malignant neoplasm 11/25/2019    Hospital discharge follow-up    Encounter for screening mammogram for malignant neoplasm of breast 09/21/2016    Encounter for mammogram to establish baseline mammogram    Hyperlipidemia, unspecified 03/13/2015    Dyslipidemia    Low back pain, unspecified 10/09/2019    Acute exacerbation of chronic low back pain    Low back pain, unspecified 10/09/2019    Acute exacerbation of chronic low back pain    Low back pain, unspecified 07/25/2017    Acute exacerbation of chronic low back pain    Other specified postprocedural states     H/O colonoscopy    Personal history of other diseases of the musculoskeletal system and connective tissue 04/05/2018    History of muscle spasm    Personal history of other diseases of urinary system 03/15/2018    History of hematuria    Personal history of other medical treatment     H/O bone density study    Personal history of other medical treatment     History of mammogram    Personal history of other specified conditions 05/02/2020    History of nasal congestion    Personal history of other specified conditions 11/25/2019    History of bacteremia    Rash and other nonspecific skin eruption 03/28/2016    Skin rash    Strain of muscle, fascia and tendon of lower back, initial encounter 04/06/2016    Lumbar strain    Urinary tract infection, site not specified 04/05/2018    Acute UTI    Vitamin D deficiency, unspecified 02/08/2016    Vitamin D deficiency   [3]   Current  Outpatient Medications on File Prior to Visit   Medication Sig Dispense Refill    acetaminophen (Tylenol Extra Strength) 500 mg tablet Take 2 tablets (1,000 mg) by mouth every 6 hours if needed.      albuterol 90 mcg/actuation inhaler Inhale 2 puffs every 4 hours if needed.      amLODIPine (Norvasc) 10 mg tablet Take 1 tablet (10 mg) by mouth once daily. 90 tablet 3    aspirin (Aspirin Childrens) 81 mg chewable tablet Chew 1 tablet (81 mg) once daily.      clindamycin (Cleocin) 300 mg capsule Take 2 capsules (600 mg) by mouth 1 time. 60 MINUTES PRIOR TO DENTAL CLEANINGS AND PROCEDURES      docusate sodium (Colace) 100 mg capsule Take 1 capsule (100 mg) by mouth 2 times a day as needed for constipation (May buy over the counter.).      escitalopram (Lexapro) 20 mg tablet Take 1 tablet (20 mg) by mouth once daily. 90 tablet 1    folic acid (Folvite) 1 mg tablet Take 1 tablet (1 mg) by mouth once daily.      gabapentin (Neurontin) 100 mg capsule Take 1 capsule (100 mg) by mouth 3 times a day. 90 capsule 2    glucos sul 2KCl/msm/chond/C/Mn (GLUCOSAMINE CHONDROITIN ORAL) Take 1 tablet by mouth once daily. Glucosamine and Chondroit-MV-Min3 014-540-53-0.5 mg ORAL Tab      ipratropium-albuteroL (Duo-Neb) 0.5-2.5 mg/3 mL nebulizer solution Inhale 3 mL every 2 hours if needed for shortness of breath.      lidocaine (LMX) 4 % cream Apply topically 4 times a day as needed for mild pain (1 - 3).      losartan (Cozaar) 25 mg tablet Take 1 tablet (25 mg) by mouth once daily. 90 tablet 3    metoprolol tartrate (Lopressor) 25 mg tablet Take 0.5 tablets (12.5 mg) by mouth every 12 hours. 90 tablet 3    multivitamin-min-iron-FA-vit K (Adults Multivitamin) 18 mg iron-400 mcg-25 mcg tablet Take 1 tablet by mouth once daily.      oxybutynin XL (Ditropan-XL) 5 mg 24 hr tablet Take 1 tablet (5 mg) by mouth once daily. 90 tablet 1    oxygen (O2) therapy 3 L continuously.      pantoprazole (ProtoNix) 40 mg EC tablet Take 1 tablet (40 mg) by  mouth once daily in the morning. Take before meals. 90 tablet 3    rosuvastatin (Crestor) 40 mg tablet Take 1 tablet (40 mg) by mouth once daily at bedtime. 90 tablet 3    warfarin (Coumadin) 10 mg tablet Take 1 tablet (10 mg) by mouth once daily.      warfarin (Coumadin) 2.5 mg tablet 1 tab qd 90 tablet 1    warfarin (Coumadin) 5 mg tablet Take 1 tablet (5 mg) by mouth once daily at bedtime. Daily as directed 90 tablet 1    [DISCONTINUED] semaglutide, weight loss, (Wegovy) 1 mg/0.5 mL pen injector Inject 1 mg under the skin 1 (one) time per week. 2 mL 1     No current facility-administered medications on file prior to visit.

## 2025-04-30 ENCOUNTER — ANTICOAGULATION - WARFARIN VISIT (OUTPATIENT)
Dept: CARDIOLOGY | Facility: CLINIC | Age: 75
End: 2025-04-30
Payer: MEDICARE

## 2025-04-30 DIAGNOSIS — Z95.2 S/P AVR (AORTIC VALVE REPLACEMENT): Primary | ICD-10-CM

## 2025-04-30 DIAGNOSIS — I48.11 LONGSTANDING PERSISTENT ATRIAL FIBRILLATION (MULTI): ICD-10-CM

## 2025-04-30 DIAGNOSIS — I10 BENIGN ESSENTIAL HYPERTENSION: Primary | ICD-10-CM

## 2025-04-30 LAB
INR IN PPP BY COAGULATION ASSAY EXTERNAL: 2.7
PROTHROMBIN TIME (PT) IN PPP BY COAGULATION ASSAY EXTERNAL: NORMAL

## 2025-04-30 NOTE — PROGRESS NOTES
Patient identification verified with 2 identifiers.    Location: [facility] Patient Self-Testing Program [phone]    Referring Physician: DR. NAVEED JAIN  Enrollment/ Re-enrollment date: 2025   INR Goal: 2.0-3.0  INR monitoring is per [facility] protocol.  Anticoagulation Medication: warfarin  Indication: Aortic Valve Replacement    Subjective   Bleeding signs/symptoms: No    Bruising: No   Major bleeding event: No  Thrombosis signs/symptoms: No  Thromboembolic event: No  Missed doses: No  Extra doses: No  Medication changes: No  Dietary changes: No  Change in health: No  Change in activity: No  Alcohol: No  Other concerns: No    Upcoming Procedures:  Does the Patient Have any upcoming procedures that require interruption in anticoagulation therapy? no  Does the patient require bridging? no      Anticoagulation Summary  As of 2025      INR goal:  2.0-3.0   TTR:  69.6% (1.6 y)   INR used for dosin.70 (2025)   Weekly warfarin total:  37.5 mg               Assessment/Plan   Therapeutic     1. New dose: no change  SPOKE TO PT. CONFIRMED CURRENT DOSING INSTRUCTIONS  2. Next INR: 1 week      Education provided to patient during the visit:  Patient instructed to call in interim with questions, concerns and changes.   Patient educated on compliance with dosing, follow up appointments, and prescribed plan of care.

## 2025-05-02 ENCOUNTER — PHARMACY VISIT (OUTPATIENT)
Dept: PHARMACY | Facility: CLINIC | Age: 75
End: 2025-05-02
Payer: MEDICARE

## 2025-05-02 RX ORDER — AMLODIPINE BESYLATE 10 MG/1
10 TABLET ORAL DAILY
Qty: 90 TABLET | Refills: 3 | Status: SHIPPED | OUTPATIENT
Start: 2025-05-02 | End: 2026-05-02

## 2025-05-07 ENCOUNTER — ANTICOAGULATION - WARFARIN VISIT (OUTPATIENT)
Dept: CARDIOLOGY | Facility: CLINIC | Age: 75
End: 2025-05-07
Payer: MEDICARE

## 2025-05-07 DIAGNOSIS — Z95.2 S/P AVR (AORTIC VALVE REPLACEMENT): Primary | ICD-10-CM

## 2025-05-07 DIAGNOSIS — I48.11 LONGSTANDING PERSISTENT ATRIAL FIBRILLATION (MULTI): ICD-10-CM

## 2025-05-07 NOTE — PROGRESS NOTES
Patient identification verified with 2 identifiers.    Location: [facility] Patient Self-Testing Program [phone]    Referring Physician: DR. NAVEED JAIN  Enrollment/ Re-enrollment date: 2025   INR Goal: 2.0-3.0  INR monitoring is per [facility] protocol.  Anticoagulation Medication: warfarin  Indication: Aortic Valve Replacement    Subjective   Bleeding signs/symptoms: No    Bruising: No   Major bleeding event: No  Thrombosis signs/symptoms: No  Thromboembolic event: No  Missed doses: No  Extra doses: No  Medication changes: No  Dietary changes: No  Change in health: No  Change in activity: No  Alcohol: No  Other concerns: No    Upcoming Procedures:  Does the Patient Have any upcoming procedures that require interruption in anticoagulation therapy? no  Does the patient require bridging? no      Anticoagulation Summary  As of 2025      INR goal:  2.0-3.0   TTR:  70.0% (1.6 y)   INR used for dosin.60 (2025)   Weekly warfarin total:  37.5 mg               Assessment/Plan   Therapeutic     1. New dose: no change  SPOKE TO PT. CONFIRMED CURRENT DOSING INSTRUCTIONS  2. Next INR: 2 weeks      Education provided to patient during the visit:  Patient instructed to call in interim with questions, concerns and changes.   Patient educated on compliance with dosing, follow up appointments, and prescribed plan of care.

## 2025-05-23 NOTE — PROGRESS NOTES
"  Pharmacist Clinic: Cardiology Management    Mario Alberto Pena is a 74 y.o. female was referred to Clinical Pharmacy Team for Weight Loss management.     Referring Provider: Shanique Longo APRN*    THIS IS A FOLLOW UP PATIENT APPOINTMENT. AT LAST VISIT ON 4/29/25 WITH PHARMACIST (Himanshu Wiggins).    Appointment was completed by Mario Alberto who was reached at primary number.    REVIEW OF PAST APPNT (IF APPLICABLE):   During last appointment Wegovy was increased to 1.7mg weekly dose.  Previously noted some constipation, but no other ill side effects.  Allergies[1]    Medical History[2]    Medications Ordered Prior to Encounter[3]      RELEVANT LAB RESULTS:  Lab Results   Component Value Date    BILITOT 0.6 03/19/2025    CALCIUM 11.2 (H) 03/19/2025    CO2 27 03/19/2025     03/19/2025    CREATININE 0.90 03/19/2025    GLUCOSE 113 (H) 03/19/2025    ALKPHOS 90 03/19/2025    K 4.2 03/19/2025    PROT 6.6 03/19/2025     03/19/2025    AST 20 03/19/2025    ALT 22 03/19/2025    BUN 17 03/19/2025    ANIONGAP 7 03/19/2025    MG 1.81 07/12/2023    PHOS 2.9 07/12/2023     11/02/2020    ALBUMIN 4.3 03/19/2025    GFRF 52 (A) 07/12/2023     Lab Results   Component Value Date    TRIG 120 03/19/2025    CHOL 128 03/19/2025    LDLCALC 48 03/19/2025    HDL 59 03/19/2025     No results found for: \"BMCBC\", \"CBCDIF\"     PHARMACEUTICAL ASSESSMENT:    MEDICATION RECONCILIATION    Was a medication reconciliation completed at this visit? No    Drug Interactions? No    Medication Documentation Review Audit       Reviewed by Jigar Hurst MD (Physician) on 03/19/25 at 1403      Medication Order Taking? Sig Documenting Provider Last Dose Status   acetaminophen (Tylenol Extra Strength) 500 mg tablet [id number] No Take 2 tablets (1,000 mg) by mouth every 6 hours if needed. Historical Provider, MD Taking Active   albuterol 90 mcg/actuation inhaler [id number] No Inhale 2 puffs every 4 hours if needed. Historical Provider, MD Taking " Active   amLODIPine (Norvasc) 10 mg tablet [id number] No Take 1 tablet (10 mg) by mouth once daily. Silas Lawson MD Taking Active   aspirin (Aspirin Childrens) 81 mg chewable tablet [id number]  Chew 1 tablet (81 mg) once daily. Pam Macias MD  Active   clindamycin (Cleocin) 300 mg capsule [id number] No Take 2 capsules (600 mg) by mouth 1 time. 60 MINUTES PRIOR TO DENTAL CLEANINGS AND PROCEDURES Pam Macias MD Taking Active   docusate sodium (Colace) 100 mg capsule [id number] No Take 1 capsule (100 mg) by mouth 2 times a day as needed for constipation (May buy over the counter.). Pam Macias MD Taking Active     Discontinued 03/19/25 1358   escitalopram (Lexapro) 20 mg tablet [id number]  Take 1 tablet (20 mg) by mouth once daily. Jigar Hurst MD  Active   folic acid (Folvite) 1 mg tablet [id number] No Take 1 tablet (1 mg) by mouth once daily. Pam Macias MD Taking Active   gabapentin (Neurontin) 100 mg capsule [id number]  Take 1 capsule (100 mg) by mouth 3 times a day. Jigar Hurst MD  Active   glucos sul 2KCl/msm/chond/C/Mn (GLUCOSAMINE CHONDROITIN ORAL) [id number] No Take 1 tablet by mouth once daily. Glucosamine and Chondroit-MV-Min3 [phone] mg ORAL Tab Pam Macias MD Taking Active   ipratropium-albuteroL (Duo-Neb) 0.5-2.5 mg/3 mL nebulizer solution [id number] No Inhale 3 mL every 2 hours if needed for shortness of breath. Pam Macias MD Taking Active   lidocaine (LMX) 4 % cream [id number] No Apply topically 4 times a day as needed for mild pain (1 - 3). Pam Provider, MD Taking Active   losartan (Cozaar) 25 mg tablet [id number]  Take 1 tablet (25 mg) by mouth once daily. Silas Lawson MD  Active   metoprolol tartrate (Lopressor) 25 mg tablet [id number]  Take 0.5 tablets (12.5 mg) by mouth every 12 hours. Silas Lawson MD  Active   multivitamin-min-iron-FA-vit K (Adults Multivitamin) 18 mg iron-400 mcg-25 mcg tablet [id number] No Take 1 tablet by  mouth once daily. Historical Provider, MD Taking Active     Discontinued 03/19/25 1358   oxybutynin XL (Ditropan-XL) 5 mg 24 hr tablet [id number]  Take 1 tablet (5 mg) by mouth once daily. Jigar Hurst MD  Active   oxygen (O2) therapy [id number] No 3 L continuously. Historical Provider, MD Taking Active   pantoprazole (ProtoNix) 40 mg EC tablet [id number]  Take 1 tablet (40 mg) by mouth once daily in the morning. Take before meals. Silas Lawson MD  Active   rosuvastatin (Crestor) 40 mg tablet [id number]  Take 1 tablet (40 mg) by mouth once daily at bedtime. Silas Lawson MD  Active   semaglutide, weight loss, (Wegovy) 0.5 mg/0.5 mL pen injector [id number]  Inject 0.5 mg under the skin 1 (one) time per week. Shanique Longo, APRN-CNP  Active   warfarin (Coumadin) 10 mg tablet [id number] No Take 1 tablet (10 mg) by mouth once daily. Historical Provider, MD Taking Active     Discontinued 03/19/25 1358   warfarin (Coumadin) 2.5 mg tablet [id number]  1 tab qd Jigar Hurst MD  Active     Discontinued 03/19/25 1358   warfarin (Coumadin) 5 mg tablet [id number]  Take 1 tablet (5 mg) by mouth once daily at bedtime. Daily as directed Jigar Hurst MD  Active                    DISEASE MANAGEMENT ASSESSMENT:     WEIGHT LOSS ASSESSMENT     Wt Readings from Last 3 Encounters:   12/19/24 86.5 kg (190 lb 11.2 oz)   09/17/24 84.8 kg (187 lb)   06/26/24 83.4 kg (183 lb 12.8 oz)     BMI Readings from Last 3 Encounters:   03/19/25 30.78 kg/m²   12/19/24 30.78 kg/m²   09/17/24 30.18 kg/m²       Recorded Home Weight(s): 178lbs  Diet: continuing to eat 2-3 meals a day.  Some nausea has prevented her from eating meals.  Notes her portion sizes have decreased.  Exercise Routine: only able to walk short distances due to fall in February  Balance has improved and started to exercise again  Additional Contributory Factors recovering from fall in February     CURRENT PHARMACOTHERAPY   Wegovy 1.7mg weekly     Affordability/Accessibility:  reports medication is affordable  Adherence/Organization: Sundays  Adverse Reactions: noted having some vomiting when starting on 1.7mg dose. Continuing to have constipation and bloating.    Relevant PMH:  PMH of Pancreatitis? no  PMH of Retinopathy? no  PMH of MTC? no      COUNSELING:   - Counseled patient on MOA, expectations, side effects, duration of therapy, contraindications, administration, and monitoring parameters  - Answered all patient questions and concerns; provided [facility] phone number if issues/questions arise       DISCUSSION/NOTES:   Patient has not noticed much weight loss since increasing Wegovy dose.  Had some vomiting when she started taking Wegovy 1.7mg weekly, but her nausea has decreased as she continued on the medication.  Constipation has improved as she usually has a bowel movement every other day.    ASSESSMENT:    Assessment/Plan   Problem List Items Addressed This Visit       CAD (coronary artery disease)    Plan to maintain at current Wegovy dose to prevent any worsening side effects from the medication.  Will be able to evaluate at next appointment if we will maintain at 1.7mg dose or increase to 2.4mg.         Relevant Medications    semaglutide, weight loss, (Wegovy) 1.7 mg/0.75 mL pen injector (Start on 6/1/2025)    Other Relevant Orders    Referral to Clinical Pharmacy     Other Visit Diagnoses         Obesity (BMI 30-39.9)        Relevant Medications    semaglutide, weight loss, (Wegovy) 1.7 mg/0.75 mL pen injector (Start on 6/1/2025)    Other Relevant Orders    Referral to Clinical Pharmacy              RECOMMENDATIONS/PLAN:    CONTINUE  Wegovy 1.7mg weekly    Last Appnt with Referring Provider: 12/19/24  Next Appnt with Referring Provider: 6/24/25  Clinical Pharmacist follow up: 8/19/25  VAF/Application Expiration: No  Type of Encounter: Bang Wiggins PharmD    Verbal consent to manage patient's drug therapy was obtained from the patient . They were informed they  may decline to participate or withdraw from participation in pharmacy services at any time.    Continue all meds under the continuation of care with the referring provider and clinical pharmacy team.            [1]   Allergies  Allergen Reactions    Heparin Other     Low Platelet Count    Lisinopril Cough    Penicillins Unknown   [2]   Past Medical History:  Diagnosis Date    Allergic contact dermatitis due to plants, except food 08/24/2018    Poison ivy    Cerebrovascular disease, unspecified 03/13/2015    Ill-defined cerebrovascular disease    Encounter for follow-up examination after completed treatment for conditions other than malignant neoplasm 11/25/2019    Hospital discharge follow-up    Encounter for screening mammogram for malignant neoplasm of breast 09/21/2016    Encounter for mammogram to establish baseline mammogram    Hyperlipidemia, unspecified 03/13/2015    Dyslipidemia    Low back pain, unspecified 10/09/2019    Acute exacerbation of chronic low back pain    Low back pain, unspecified 10/09/2019    Acute exacerbation of chronic low back pain    Low back pain, unspecified 07/25/2017    Acute exacerbation of chronic low back pain    Other specified postprocedural states     H/O colonoscopy    Personal history of other diseases of the musculoskeletal system and connective tissue 04/05/2018    History of muscle spasm    Personal history of other diseases of urinary system 03/15/2018    History of hematuria    Personal history of other medical treatment     H/O bone density study    Personal history of other medical treatment     History of mammogram    Personal history of other specified conditions 05/02/2020    History of nasal congestion    Personal history of other specified conditions 11/25/2019    History of bacteremia    Rash and other nonspecific skin eruption 03/28/2016    Skin rash    Strain of muscle, fascia and tendon of lower back, initial encounter 04/06/2016    Lumbar strain    Urinary  tract infection, site not specified 04/05/2018    Acute UTI    Vitamin D deficiency, unspecified 02/08/2016    Vitamin D deficiency   [3]   Current Outpatient Medications on File Prior to Visit   Medication Sig Dispense Refill    acetaminophen (Tylenol Extra Strength) 500 mg tablet Take 2 tablets (1,000 mg) by mouth every 6 hours if needed.      albuterol 90 mcg/actuation inhaler Inhale 2 puffs every 4 hours if needed.      amLODIPine (Norvasc) 10 mg tablet Take 1 tablet (10 mg) by mouth once daily. 90 tablet 3    aspirin (Aspirin Childrens) 81 mg chewable tablet Chew 1 tablet (81 mg) once daily.      clindamycin (Cleocin) 300 mg capsule Take 2 capsules (600 mg) by mouth 1 time. 60 MINUTES PRIOR TO DENTAL CLEANINGS AND PROCEDURES      docusate sodium (Colace) 100 mg capsule Take 1 capsule (100 mg) by mouth 2 times a day as needed for constipation (May buy over the counter.).      escitalopram (Lexapro) 20 mg tablet Take 1 tablet (20 mg) by mouth once daily. 90 tablet 1    folic acid (Folvite) 1 mg tablet Take 1 tablet (1 mg) by mouth once daily.      gabapentin (Neurontin) 100 mg capsule Take 1 capsule (100 mg) by mouth 3 times a day. 90 capsule 2    glucos sul 2KCl/msm/chond/C/Mn (GLUCOSAMINE CHONDROITIN ORAL) Take 1 tablet by mouth once daily. Glucosamine and Chondroit-MV-Min3 507-112-34-0.5 mg ORAL Tab      ipratropium-albuteroL (Duo-Neb) 0.5-2.5 mg/3 mL nebulizer solution Inhale 3 mL every 2 hours if needed for shortness of breath.      lidocaine (LMX) 4 % cream Apply topically 4 times a day as needed for mild pain (1 - 3).      losartan (Cozaar) 25 mg tablet Take 1 tablet (25 mg) by mouth once daily. 90 tablet 3    metoprolol tartrate (Lopressor) 25 mg tablet Take 0.5 tablets (12.5 mg) by mouth every 12 hours. 90 tablet 3    multivitamin-min-iron-FA-vit K (Adults Multivitamin) 18 mg iron-400 mcg-25 mcg tablet Take 1 tablet by mouth once daily.      oxybutynin XL (Ditropan-XL) 5 mg 24 hr tablet Take 1 tablet (5  mg) by mouth once daily. 90 tablet 1    oxygen (O2) therapy 3 L continuously.      pantoprazole (ProtoNix) 40 mg EC tablet Take 1 tablet (40 mg) by mouth once daily in the morning. Take before meals. 90 tablet 3    rosuvastatin (Crestor) 40 mg tablet Take 1 tablet (40 mg) by mouth once daily at bedtime. 90 tablet 3    warfarin (Coumadin) 10 mg tablet Take 1 tablet (10 mg) by mouth once daily.      warfarin (Coumadin) 2.5 mg tablet 1 tab qd 90 tablet 1    warfarin (Coumadin) 5 mg tablet Take 1 tablet (5 mg) by mouth once daily at bedtime. Daily as directed 90 tablet 1    [DISCONTINUED] semaglutide, weight loss, (Wegovy) 1.7 mg/0.75 mL pen injector Inject 1.7 mg under the skin 1 (one) time per week. 3 mL 1     No current facility-administered medications on file prior to visit.

## 2025-05-27 ENCOUNTER — APPOINTMENT (OUTPATIENT)
Dept: PHARMACY | Facility: HOSPITAL | Age: 75
End: 2025-05-27
Payer: MEDICARE

## 2025-05-27 DIAGNOSIS — E66.9 OBESITY (BMI 30-39.9): ICD-10-CM

## 2025-05-27 DIAGNOSIS — I25.10 CORONARY ARTERY DISEASE INVOLVING NATIVE HEART WITHOUT ANGINA PECTORIS, UNSPECIFIED VESSEL OR LESION TYPE: ICD-10-CM

## 2025-05-27 RX ORDER — SEMAGLUTIDE 1.7 MG/.75ML
1.7 INJECTION, SOLUTION SUBCUTANEOUS
Qty: 6 ML | Refills: 1 | Status: SHIPPED | OUTPATIENT
Start: 2025-06-01

## 2025-05-27 NOTE — Clinical Note
Mario Alberto is starting to have some worsening nausea with Wegovy, so we will continue with 1.7mg weekly dose at this time. Most likely this will be her maintenance dose.

## 2025-05-27 NOTE — ASSESSMENT & PLAN NOTE
Plan to maintain at current Wegovy dose to prevent any worsening side effects from the medication.  Will be able to evaluate at next appointment if we will maintain at 1.7mg dose or increase to 2.4mg.

## 2025-05-28 ENCOUNTER — ANTICOAGULATION - WARFARIN VISIT (OUTPATIENT)
Dept: CARDIOLOGY | Facility: CLINIC | Age: 75
End: 2025-05-28
Payer: MEDICARE

## 2025-05-28 DIAGNOSIS — I48.11 LONGSTANDING PERSISTENT ATRIAL FIBRILLATION (MULTI): ICD-10-CM

## 2025-05-28 DIAGNOSIS — Z95.2 S/P AVR (AORTIC VALVE REPLACEMENT): Primary | ICD-10-CM

## 2025-05-28 NOTE — PROGRESS NOTES
Patient identification verified with 2 identifiers.    Location: [facility] Patient Self-Testing Program [phone]    Referring Physician: DR. WINSOME JAIN  Enrollment/ Re-enrollment date: 2025   INR Goal: 2.0-3.0  INR monitoring is per [facility] protocol.  Anticoagulation Medication: warfarin  Indication: Aortic Valve Replacement    Subjective   Bleeding signs/symptoms: No    Bruising: No   Major bleeding event: No  Thrombosis signs/symptoms: No  Thromboembolic event: No  Missed doses: No  Extra doses: No  Medication changes: No  Dietary changes: No  Change in health: No  Change in activity: No  Alcohol: No  Other concerns: No    Upcoming Procedures:  Does the Patient Have any upcoming procedures that require interruption in anticoagulation therapy? no  Does the patient require bridging? no      Anticoagulation Summary  As of 2025      INR goal:  2.0-3.0   TTR:  69.8% (1.7 y)   INR used for dosin.70 (2025)   Weekly warfarin total:  40 mg               Assessment/Plan   Subtherapeutic     1. New dose: SPOKE TO PT. TWD INCREASED. CONFIRMED NEW DOSING INSTRUCTIONS. PT REPEATED CORRECTLY    2. Next INR: 1 week      Education provided to patient during the visit:  Patient instructed to call in interim with questions, concerns and changes.   Patient educated on compliance with dosing, follow up appointments, and prescribed plan of care.

## 2025-06-02 PROCEDURE — RXMED WILLOW AMBULATORY MEDICATION CHARGE

## 2025-06-03 ENCOUNTER — PHARMACY VISIT (OUTPATIENT)
Dept: PHARMACY | Facility: CLINIC | Age: 75
End: 2025-06-03
Payer: MEDICARE

## 2025-06-04 ENCOUNTER — ANTICOAGULATION - WARFARIN VISIT (OUTPATIENT)
Dept: CARDIOLOGY | Facility: HOSPITAL | Age: 75
End: 2025-06-04
Payer: MEDICARE

## 2025-06-04 DIAGNOSIS — Z95.2 S/P AVR (AORTIC VALVE REPLACEMENT): Primary | ICD-10-CM

## 2025-06-04 DIAGNOSIS — I48.11 LONGSTANDING PERSISTENT ATRIAL FIBRILLATION (MULTI): ICD-10-CM

## 2025-06-04 NOTE — PROGRESS NOTES
Patient identification verified with 2 identifiers.    Location: [facility] Patient Self-Testing Program [phone]     Referring Physician: DR. WINSOME JAIN  Enrollment/ Re-enrollment date: 2025   INR Goal: 2.0-3.0  INR monitoring is per [facility] protocol.  Anticoagulation Medication: warfarin  Indication: Aortic Valve Replacement      Subjective   Bleeding signs/symptoms: No    Bruising: No   Major bleeding event: No  Thrombosis signs/symptoms: No  Thromboembolic event: No  Missed doses: No  Extra doses: No  Medication changes: No  Dietary changes: No  Change in health: No  Change in activity: No  Alcohol: No  Other concerns: No    Upcoming Procedures:  Does the Patient Have any upcoming procedures that require interruption in anticoagulation therapy? no  Does the patient require bridging? no      Anticoagulation Summary  As of 2025      INR goal:  2.0-3.0   TTR:  69.8% (1.7 y)   INR used for dosin.60 (2025)   Weekly warfarin total:  40 mg               Assessment/Plan   Therapeutic     1. New dose: no change    2. Next INR: 1 week      Education provided to patient during the visit:  Patient instructed to call in interim with questions, concerns and changes.   Patient educated on compliance with dosing, follow up appointments, and prescribed plan of care.

## 2025-06-11 ENCOUNTER — ANTICOAGULATION - WARFARIN VISIT (OUTPATIENT)
Dept: CARDIOLOGY | Facility: CLINIC | Age: 75
End: 2025-06-11
Payer: MEDICARE

## 2025-06-11 DIAGNOSIS — Z95.2 S/P AVR (AORTIC VALVE REPLACEMENT): Primary | ICD-10-CM

## 2025-06-11 DIAGNOSIS — I48.11 LONGSTANDING PERSISTENT ATRIAL FIBRILLATION (MULTI): ICD-10-CM

## 2025-06-11 NOTE — PROGRESS NOTES
Patient identification verified with 2 identifiers.    Location: [facility] Patient Self-Testing Program [phone]    Referring Physician: DR. NAVEED JAIN  Enrollment/ Re-enrollment date: 2025   INR Goal: 2.0-3.0  INR monitoring is per [facility] protocol.  Anticoagulation Medication: warfarin  Indication: Aortic Valve Replacement    Subjective   Bleeding signs/symptoms: No    Bruising: No   Major bleeding event: No  Thrombosis signs/symptoms: No  Thromboembolic event: No  Missed doses: No  Extra doses: No  Medication changes: No  Dietary changes: No  Change in health: No  Change in activity: No  Alcohol: No  Other concerns: No    Upcoming Procedures:  Does the Patient Have any upcoming procedures that require interruption in anticoagulation therapy? no  Does the patient require bridging? no      Anticoagulation Summary  As of 2025      INR goal:  2.0-3.0   TTR:  70.2% (1.7 y)   INR used for dosin.50 (2025)   Weekly warfarin total:  40 mg               Assessment/Plan   Therapeutic     1. New dose: no change  SPOKE WITH PT. CONFIRMED CURRENT DOSING INSTRUCTIONS  2. Next INR: 1 week      Education provided to patient during the visit:  Patient instructed to call in interim with questions, concerns and changes.   Patient educated on compliance with dosing, follow up appointments, and prescribed plan of care.

## 2025-06-18 ENCOUNTER — ANTICOAGULATION - WARFARIN VISIT (OUTPATIENT)
Dept: CARDIOLOGY | Facility: CLINIC | Age: 75
End: 2025-06-18
Payer: MEDICARE

## 2025-06-18 DIAGNOSIS — Z95.2 S/P AVR (AORTIC VALVE REPLACEMENT): Primary | ICD-10-CM

## 2025-06-18 DIAGNOSIS — I48.11 LONGSTANDING PERSISTENT ATRIAL FIBRILLATION (MULTI): ICD-10-CM

## 2025-06-18 LAB
INR IN PPP BY COAGULATION ASSAY EXTERNAL: 2
PROTHROMBIN TIME (PT) IN PPP BY COAGULATION ASSAY EXTERNAL: NORMAL

## 2025-06-18 NOTE — PROGRESS NOTES
Patient identification verified with 2 identifiers.    Location: [facility] Patient Self-Testing Program [phone]    Referring Physician: DR. NAVEED JAIN  Enrollment/ Re-enrollment date: 2025   INR Goal: 2.0-3.0  INR monitoring is per [facility] protocol.  Anticoagulation Medication: warfarin  Indication: Aortic Valve Replacement    Subjective   Bleeding signs/symptoms: No    Bruising: No   Major bleeding event: No  Thrombosis signs/symptoms: No  Thromboembolic event: No  Missed doses: No  Extra doses: No  Medication changes: No  Dietary changes: No  Change in health: No  Change in activity: No  Alcohol: No  Other concerns: No    Upcoming Procedures:  Does the Patient Have any upcoming procedures that require interruption in anticoagulation therapy? no  Does the patient require bridging? no      Anticoagulation Summary  As of 2025      INR goal:  2.0-3.0   TTR:  70.5% (1.7 y)   INR used for dosin.00 (2025)   Weekly warfarin total:  40 mg               Assessment/Plan   Therapeutic     1. New dose: no change. SPOKE TO PT. CONFIRMED CURRENT DOSING INSTRUCTIONS    2. Next INR: 1 week      Education provided to patient during the visit:  Patient instructed to call in interim with questions, concerns and changes.   Patient educated on compliance with dosing, follow up appointments, and prescribed plan of care.

## 2025-06-24 ENCOUNTER — OFFICE VISIT (OUTPATIENT)
Dept: CARDIOLOGY | Facility: HOSPITAL | Age: 75
End: 2025-06-24
Payer: MEDICARE

## 2025-06-24 VITALS
OXYGEN SATURATION: 94 % | BODY MASS INDEX: 28.73 KG/M2 | DIASTOLIC BLOOD PRESSURE: 77 MMHG | WEIGHT: 178 LBS | SYSTOLIC BLOOD PRESSURE: 118 MMHG

## 2025-06-24 DIAGNOSIS — Z95.2 S/P AVR (AORTIC VALVE REPLACEMENT): ICD-10-CM

## 2025-06-24 DIAGNOSIS — I48.0 PAROXYSMAL ATRIAL FIBRILLATION (MULTI): Primary | ICD-10-CM

## 2025-06-24 DIAGNOSIS — E78.00 PURE HYPERCHOLESTEROLEMIA: ICD-10-CM

## 2025-06-24 DIAGNOSIS — I25.10 CORONARY ARTERY DISEASE INVOLVING NATIVE CORONARY ARTERY OF NATIVE HEART WITHOUT ANGINA PECTORIS: ICD-10-CM

## 2025-06-24 DIAGNOSIS — I10 BENIGN ESSENTIAL HYPERTENSION: ICD-10-CM

## 2025-06-24 LAB
ATRIAL RATE: 78 BPM
P AXIS: 56 DEGREES
P OFFSET: 197 MS
P ONSET: 146 MS
PR INTERVAL: 144 MS
Q ONSET: 218 MS
QRS COUNT: 13 BEATS
QRS DURATION: 94 MS
QT INTERVAL: 388 MS
QTC CALCULATION(BAZETT): 442 MS
QTC FREDERICIA: 423 MS
R AXIS: 60 DEGREES
T AXIS: 93 DEGREES
T OFFSET: 412 MS
VENTRICULAR RATE: 78 BPM

## 2025-06-24 PROCEDURE — 1159F MED LIST DOCD IN RCRD: CPT | Performed by: NURSE PRACTITIONER

## 2025-06-24 PROCEDURE — G2211 COMPLEX E/M VISIT ADD ON: HCPCS | Performed by: NURSE PRACTITIONER

## 2025-06-24 PROCEDURE — 93005 ELECTROCARDIOGRAM TRACING: CPT | Performed by: NURSE PRACTITIONER

## 2025-06-24 PROCEDURE — 99213 OFFICE O/P EST LOW 20 MIN: CPT

## 2025-06-24 PROCEDURE — 99214 OFFICE O/P EST MOD 30 MIN: CPT | Performed by: NURSE PRACTITIONER

## 2025-06-24 PROCEDURE — 4010F ACE/ARB THERAPY RXD/TAKEN: CPT | Performed by: NURSE PRACTITIONER

## 2025-06-24 PROCEDURE — 3074F SYST BP LT 130 MM HG: CPT | Performed by: NURSE PRACTITIONER

## 2025-06-24 PROCEDURE — 1036F TOBACCO NON-USER: CPT | Performed by: NURSE PRACTITIONER

## 2025-06-24 PROCEDURE — 3078F DIAST BP <80 MM HG: CPT | Performed by: NURSE PRACTITIONER

## 2025-06-24 PROCEDURE — 1160F RVW MEDS BY RX/DR IN RCRD: CPT | Performed by: NURSE PRACTITIONER

## 2025-06-24 NOTE — PROGRESS NOTES
Primary Care Physician: Jigar Hurst MD  Date of Visit: 06/24/2025 10:30 AM EDT  Location of visit: [facility]     Chief Complaint:   Chief Complaint   Patient presents with    Follow-up        HPI / Summary:   Mario Alberto Pena is a 74 y.o. female presents for followup. Seen in collaboration with Dr. Lawson. She is overall feeling well. She is accompanied by her son. She has been walking the stores without chest pain. She has mild dyspnea on exertion walking for a prolonged period of time that has been ongoing since her valve surgery and unchanged. She wears supplemental oxygen as needed. She had a mechanical fall slipping on ice in February. Denies chest pain, orthopnea, pnd, lightheadedness, dizziness, syncope, palpitations, lower extremity edema, or bleeding issues.     She has lost 12 pounds since starting Wegovy.           Past Medical History:  Past Medical History:   Diagnosis Date    Allergic contact dermatitis due to plants, except food 08/24/2018    Poison ivy    Cerebrovascular disease, unspecified 03/13/2015    Ill-defined cerebrovascular disease    Encounter for follow-up examination after completed treatment for conditions other than malignant neoplasm 11/25/2019    Hospital discharge follow-up    Encounter for screening mammogram for malignant neoplasm of breast 09/21/2016    Encounter for mammogram to establish baseline mammogram    Hyperlipidemia, unspecified 03/13/2015    Dyslipidemia    Low back pain, unspecified 10/09/2019    Acute exacerbation of chronic low back pain    Low back pain, unspecified 10/09/2019    Acute exacerbation of chronic low back pain    Low back pain, unspecified 07/25/2017    Acute exacerbation of chronic low back pain    Other specified postprocedural states     H/O colonoscopy    Personal history of other diseases of the musculoskeletal system and connective tissue 04/05/2018    History of muscle spasm    Personal history of other diseases of urinary system  03/15/2018    History of hematuria    Personal history of other medical treatment     H/O bone density study    Personal history of other medical treatment     History of mammogram    Personal history of other specified conditions 05/02/2020    History of nasal congestion    Personal history of other specified conditions 11/25/2019    History of bacteremia    Rash and other nonspecific skin eruption 03/28/2016    Skin rash    Strain of muscle, fascia and tendon of lower back, initial encounter 04/06/2016    Lumbar strain    Urinary tract infection, site not specified 04/05/2018    Acute UTI    Vitamin D deficiency, unspecified 02/08/2016    Vitamin D deficiency        Past Surgical History:  Past Surgical History:   Procedure Laterality Date    CT ANGIO NECK  8/28/2020    CT NECK ANGIO W AND WO IV CONTRAST 8/28/2020 [facility] INPATIENT LEGAC[facility]    HYSTERECTOMY  03/13/2015    Hysterectomy    OTHER SURGICAL HISTORY  01/22/2018    Aortic Valve Repair          Social History:   reports that she quit smoking about 30 years ago. Her smoking use included cigarettes. She has never used smokeless tobacco. She reports that she does not drink alcohol and does not use drugs.     Family History:  family history includes Alzheimer's disease in her mother; Colon cancer in her mother.      Allergies:  Allergies   Allergen Reactions    Heparin Other     Low Platelet Count    Lisinopril Cough    Penicillins Unknown       Outpatient Medications:  Current Outpatient Medications   Medication Instructions    acetaminophen (Tylenol Extra Strength) 500 mg tablet 2 tablets, Every 6 hours PRN    albuterol 90 mcg/actuation inhaler 2 puffs, Every 4 hours PRN    amLODIPine (NORVASC) 10 mg, oral, Daily    aspirin (Aspirin Childrens) 81 mg chewable tablet 1 tablet, Daily    clindamycin (Cleocin) 300 mg capsule 2 capsules, Once    docusate sodium (Colace) 100 mg capsule 1 capsule, 2 times daily PRN    escitalopram (LEXAPRO) 20 mg, oral, Daily    folic  acid (Folvite) 1 mg tablet 1 tablet, Daily    gabapentin (NEURONTIN) 100 mg, oral, 3 times daily    glucos sul 2KCl/msm/chond/C/Mn (GLUCOSAMINE CHONDROITIN ORAL) 1 tablet, Daily    ipratropium-albuteroL (Duo-Neb) 0.5-2.5 mg/3 mL nebulizer solution 3 mL, Every 2 hour PRN    lidocaine (LMX) 4 % cream 4 times daily PRN    losartan (COZAAR) 25 mg, oral, Daily    metoprolol tartrate (LOPRESSOR) 12.5 mg, oral, Every 12 hours    multivitamin-min-iron-FA-vit K (Adults Multivitamin) 18 mg iron-400 mcg-25 mcg tablet 1 tablet, Daily    oxyBUTYnin XL (DITROPAN-XL) 5 mg, oral, Daily    oxygen (O2) therapy 3 L, Continuous    pantoprazole (PROTONIX) 40 mg, oral, Daily before breakfast    rosuvastatin (CRESTOR) 40 mg, oral, Nightly    warfarin (Coumadin) 10 mg tablet 1 tablet, Daily    warfarin (Coumadin) 2.5 mg tablet 1 tab qd    warfarin (COUMADIN) 5 mg, oral, Nightly, Daily as directed    Wegovy 1.7 mg, subcutaneous, Once Weekly       Physical Exam:  Vitals:    06/24/25 1043   BP: 118/77   BP Location: Left arm   Patient Position: Sitting   SpO2: 94%   Weight: 80.7 kg (178 lb)     Wt Readings from Last 5 Encounters:   06/24/25 80.7 kg (178 lb)   12/19/24 86.5 kg (190 lb 11.2 oz)   09/17/24 84.8 kg (187 lb)   06/26/24 83.4 kg (183 lb 12.8 oz)   02/29/24 82.6 kg (182 lb)     Body mass index is 28.73 kg/m².     GENERAL: alert, cooperative, pleasant, in no acute distress  SKIN: warm and dry  NECK: Normal JVD, negative HJR  CARDIAC: Regular rate and rhythm with no murmur, no rubs or gallops  CHEST: Normal respiratory efforts, lungs clear to auscultation bilaterally.  ABDOMEN: soft, nontender, nondistended  EXTREMITIES: no edema, +2 palpable left RP and unable to palpate right RP  and +2 PT pulses bilaterally       Last Labs:  Recent Labs     03/19/25  1410 12/19/24  1341 07/26/24  1205   WBC 7.2 7.6 6.8   HGB 12.8 13.8 13.4   HCT 38.2 41.5 41.4    220 223   MCV 88.4 90 92     Recent Labs     03/19/25  1410 12/19/24  1341  07/26/24  1205    140 140   K 4.2 4.5 4.8    108* 105   BUN 17 14 11   CREATININE 0.90 1.02 0.96     CMP -  Lab Results   Component Value Date    CALCIUM 11.2 (H) 03/19/2025    PHOS 2.9 07/12/2023    PROT 6.6 03/19/2025    ALBUMIN 4.3 03/19/2025    AST 20 03/19/2025    ALT 22 03/19/2025    ALKPHOS 90 03/19/2025    BILITOT 0.6 03/19/2025       LIPID PANEL -   Lab Results   Component Value Date    CHOL 128 03/19/2025    HDL 59 03/19/2025    LDLF 59 12/29/2022    TRIG 120 03/19/2025   LDL 48 3/19/25    Lab Results   Component Value Date     (H) 07/12/2022    HGBA1C 6.1 (H) 03/19/2025       Last Cardiology Tests:  ECG:  Obtained and reviewed EKG- normal sinus rhythm HR 78     Echo:  7/13/22  Echo Results:  CONCLUSIONS:   1. The left ventricular systolic function is normal with a 60-65% estimated ejection fraction.   2. Poorly visualized anatomical structures due to suboptimal image quality.   3. Mildly elevated RVSP.   4. There is a stentless aortic valve bioprosthesis.   5. No valvular vegetations seen on this limited study. Consider SYDNI if felt clinically indicated.        Cath:  11/20/2019  CONCLUSIONS:   1. Left main: no significant angiographic disease.   2. LAD: 100% mid-vessel occlusion, faint distal filling from right to left collaterals.   3. LCx: no significant angiographic disease.   4. RCA: no significant angiographic disease.   5. LVEDP 3mmHg.   6. Successful PCI to 100% mid-LAD lesion with a 2.25 x 15mm Resolute MARILEE post-dilated distally with a 2.25mm NC balloon at 24atm and proximally with a 2.5mm NC balloon at 24atm.       Stress Test:  Stress Results:  No results found for this or any previous visit from the past 365 days.     CT chest 7/13/22  HEART AND VESSELS:  The thoracic aorta is of normal course and caliber. Aortic  calcification.     Prominence of the main pulmonary artery may be due to pulmonary  artery hypertension.     Dense coronary artery calcification. The study is not  optimized for  evaluation of coronary arteries.  Borderline cardiomegaly.     No evidence of pericardial effusion.    CTA neck 8/28/2020  CTA neck:     The aortic arch contains atherosclerotic calcifications without  luminal narrowing. There are small foci of atherosclerotic  calcifications within the origins of the great vessels arising from  the aortic arch without luminal narrowing. Bilateral common carotid  arteries are normal in course and caliber. There is a small focus of  atherosclerotic calcification within the left common carotid artery.  There are atherosclerotic calcifications within the bilateral carotid  bulbs and proximal cervical internal carotid arteries without luminal  narrowing. Visualized portions of the bilateral external carotid  arteries are normal in course and caliber.     7/31/2020 US abdomen.  ABDOMINAL AORTA AND IVC:   Aorta normal in caliber. It measures up to  2 cm in caliber proximally. Calcified aortic wall plaques. The IVC is  patent.       Assessment/Plan     Mario Alberto was seen today for follow-up.  Diagnoses and all orders for this visit:  Paroxysmal atrial fibrillation (Multi) (Primary)  -     ECG 12 lead (Clinic Performed)  S/P AVR (aortic valve replacement)  Benign essential hypertension  Coronary artery disease involving native coronary artery of native heart without angina pectoris  Pure hypercholesterolemia        In summary Ms. Pena is a pleasant 74 year-old white female with a past medical history significant for severe aortic stenosis with probable bicuspid valve status post AVR with subsequent prosthetic valve endocarditis and redo AVR with reconstruction of the LVOT with mildly reduced LV function, hypertension, hyperlipidemia, and prior remote cryptogenic stroke on chronic anticoagulation with subsequent likely embolic events, and nonobstructive carotid atherosclerosis, postoperative atrial fibrillation, coronary artery disease status post PCI of her mid LAD in the  setting of a possible embolic NSTEMI, and possible HIT. She has chronic stable dyspnea on exertion that is at baseline. Her blood pressure is controlled. Recent lipid panel is at goal. She is now on Wegovy.  She should continue her current cardiovascular medications. I will see her back in follow-up in 6 months.       Followup Appts:  Future Appointments   Date Time Provider Department Center   6/25/2025  2:15 PM Jigar Hurst MD [id number] [place]   8/19/2025  9:40 AM Jorge CheathamD [id number] [facility]   12/9/2025  2:20 PM Silas Lawson MD [id number] [place]           ____________________________________________________________  Shanique Longo, APRN-CNP  [facility]  [facility]

## 2025-06-24 NOTE — PATIENT INSTRUCTIONS
Continue current meds  Follow up in 6 months  Continue to increase physical activity  Call sooner if you have any concerns or questions

## 2025-06-25 ENCOUNTER — APPOINTMENT (OUTPATIENT)
Dept: PRIMARY CARE | Facility: CLINIC | Age: 75
End: 2025-06-25
Payer: MEDICARE

## 2025-06-25 ENCOUNTER — ANTICOAGULATION - WARFARIN VISIT (OUTPATIENT)
Dept: CARDIOLOGY | Facility: CLINIC | Age: 75
End: 2025-06-25

## 2025-06-25 VITALS — SYSTOLIC BLOOD PRESSURE: 106 MMHG | WEIGHT: 180 LBS | BODY MASS INDEX: 29.05 KG/M2 | DIASTOLIC BLOOD PRESSURE: 67 MMHG

## 2025-06-25 DIAGNOSIS — E11.8 CONTROLLED TYPE 2 DIABETES MELLITUS WITH COMPLICATION, WITHOUT LONG-TERM CURRENT USE OF INSULIN (MULTI): ICD-10-CM

## 2025-06-25 DIAGNOSIS — I50.20 SYSTOLIC HEART FAILURE, UNSPECIFIED HF CHRONICITY: ICD-10-CM

## 2025-06-25 DIAGNOSIS — I10 BENIGN ESSENTIAL HYPERTENSION: ICD-10-CM

## 2025-06-25 DIAGNOSIS — E11.69 TYPE 2 DIABETES MELLITUS WITH OTHER SPECIFIED COMPLICATION, WITHOUT LONG-TERM CURRENT USE OF INSULIN: ICD-10-CM

## 2025-06-25 DIAGNOSIS — F41.9 ANXIETY: ICD-10-CM

## 2025-06-25 DIAGNOSIS — Z95.2 S/P AVR (AORTIC VALVE REPLACEMENT): Primary | ICD-10-CM

## 2025-06-25 DIAGNOSIS — J96.00 ACUTE RESPIRATORY FAILURE, UNSPECIFIED WHETHER WITH HYPOXIA OR HYPERCAPNIA: ICD-10-CM

## 2025-06-25 DIAGNOSIS — Z12.31 ENCOUNTER FOR SCREENING MAMMOGRAM FOR MALIGNANT NEOPLASM OF BREAST: Primary | ICD-10-CM

## 2025-06-25 DIAGNOSIS — I48.11 LONGSTANDING PERSISTENT ATRIAL FIBRILLATION (MULTI): ICD-10-CM

## 2025-06-25 DIAGNOSIS — D46.9 MYELODYSPLASTIC SYNDROME (MULTI): ICD-10-CM

## 2025-06-25 DIAGNOSIS — I48.0 PAROXYSMAL A-FIB (MULTI): ICD-10-CM

## 2025-06-25 PROCEDURE — 3074F SYST BP LT 130 MM HG: CPT | Performed by: STUDENT IN AN ORGANIZED HEALTH CARE EDUCATION/TRAINING PROGRAM

## 2025-06-25 PROCEDURE — G2211 COMPLEX E/M VISIT ADD ON: HCPCS | Performed by: STUDENT IN AN ORGANIZED HEALTH CARE EDUCATION/TRAINING PROGRAM

## 2025-06-25 PROCEDURE — 99214 OFFICE O/P EST MOD 30 MIN: CPT | Performed by: STUDENT IN AN ORGANIZED HEALTH CARE EDUCATION/TRAINING PROGRAM

## 2025-06-25 PROCEDURE — 4010F ACE/ARB THERAPY RXD/TAKEN: CPT | Performed by: STUDENT IN AN ORGANIZED HEALTH CARE EDUCATION/TRAINING PROGRAM

## 2025-06-25 PROCEDURE — 3078F DIAST BP <80 MM HG: CPT | Performed by: STUDENT IN AN ORGANIZED HEALTH CARE EDUCATION/TRAINING PROGRAM

## 2025-06-25 PROCEDURE — 1159F MED LIST DOCD IN RCRD: CPT | Performed by: STUDENT IN AN ORGANIZED HEALTH CARE EDUCATION/TRAINING PROGRAM

## 2025-06-25 NOTE — PROGRESS NOTES
Patient identification verified with 2 identifiers.    Location: [facility] Patient Self-Testing Program [phone]    Referring Physician: DR. NAVEED JAIN  Enrollment/ Re-enrollment date: 2025   INR Goal: 2.0-3.0  INR monitoring is per [facility] protocol.  Anticoagulation Medication: warfarin  Indication: Aortic Valve Replacement    Subjective   Bleeding signs/symptoms: No    Bruising: No   Major bleeding event: No  Thrombosis signs/symptoms: No  Thromboembolic event: No  Missed doses: No  Extra doses: No  Medication changes: No  Dietary changes: No  Change in health: No  Change in activity: No  Alcohol: No  Other concerns: No    Upcoming Procedures:  Does the Patient Have any upcoming procedures that require interruption in anticoagulation therapy? no  Does the patient require bridging? no      Anticoagulation Summary  As of 2025      INR goal:  2.0-3.0   TTR:  69.7% (1.7 y)   INR used for dosin.90 (2025)   Weekly warfarin total:  42.5 mg               Assessment/Plan   Subtherapeutic     1. New dose: SPOKE TO PT. WILL INCREASE TWD. PT REPEATED NEW DOSING INSTRUCTIONS CORRECTLY    2. Next INR: 1 week      Education provided to patient during the visit:  Patient instructed to call in interim with questions, concerns and changes.   Patient educated on compliance with dosing, follow up appointments, and prescribed plan of care.

## 2025-06-25 NOTE — PROGRESS NOTES
Subjective   Patient ID: Mario Alberto Pena is a 74 y.o. female who presents for Follow-up.    HPI   Routine fu.    She has no physical complaints.    Her  has terminal cancer, is in inpatient hospice care. This has been stressful for her.      Review of Systems  12-point ROS reviewed and was negative unless otherwise noted in HPI.    Objective   /67   Wt 81.6 kg (180 lb)   BMI 29.05 kg/m²     Physical Exam  GEN: conversant, NAD  HEENT: PERRL, EOMI, MMM  NECK: supple  CV: S1, S2, RRR  PULM: CTAB  ABD: ND  NEURO: no new gross focal deficits  EXT: no sig LE edema  PSYCH: appropriate affect    Assessment/Plan     #Anxiety and depression: continue escitalopram. Offered emotional support.     #Hypertension: continue amlodipine 10mg daily, losartan     #hypercalcemia/primary hyperparathyroidism: seeing endocrinology, endocrine surgery evaluated the patient, no indication for surgical intervention at this time, per their report. Advised to continue off Calcium/vitamin D supplementation and follow up with Endocrinology. Pt to also follow up with genetics.     #Prosthetic valve endocarditis:   #paroxysmal AFib  -s/p surgical repeat AVR (2020). Follows with Cardiology.   -Continue warfarin. Interval specialist notes reviewed. Checking her INR at home  -gets prophylaxis for dental procedures      #CAD s/p NSTEMI: stent placed to LAD 2019. Follows with Cardiology     #DM2: Discussed lifestyle modifications. Continue diet and exercise and weight loss. Seeing endocrinology.      #h/o Stroke: Continue statin. Continue warfarin. Follows with Neurology.      #HLD: Currently on atorvastatin. Continue fish oil.     #Health maintenance:   - Mammogram ordered  - Colonoscopy and EGD 11/2019, repeat recommended for 2024, she declines for now pending 's medical issues.   - Advised Shingrix.   - Advised yearly flu shot  - Advised GYN eval.   - Seeing ophthalmology and dentist annually   - advised to get Prevnar 20.  -  Received COVID vaccines, booster  - Yearly flu shots advised.  - TDaP given in 2023   - Longitudinal care.     RTC 3-4 mo

## 2025-07-02 ENCOUNTER — ANTICOAGULATION - WARFARIN VISIT (OUTPATIENT)
Dept: VASCULAR SURGERY | Facility: CLINIC | Age: 75
End: 2025-07-02
Payer: MEDICARE

## 2025-07-02 DIAGNOSIS — I48.11 LONGSTANDING PERSISTENT ATRIAL FIBRILLATION (MULTI): ICD-10-CM

## 2025-07-02 DIAGNOSIS — Z95.2 S/P AVR (AORTIC VALVE REPLACEMENT): Primary | ICD-10-CM

## 2025-07-02 LAB
INR IN PPP BY COAGULATION ASSAY EXTERNAL: 2.3
PROTHROMBIN TIME (PT) IN PPP BY COAGULATION ASSAY EXTERNAL: NORMAL

## 2025-07-02 NOTE — PROGRESS NOTES
Patient identification verified with 2 identifiers.    Location: [facility] Patient Self-Testing Program [phone]    Referring Physician: DR. NAVEED JAIN  Enrollment/ Re-enrollment date: 2025   INR Goal: 2.0-3.0  INR monitoring is per [facility] protocol.  Anticoagulation Medication: warfarin  Indication: Aortic Valve Replacement    Subjective   Bleeding signs/symptoms: No    Bruising: No   Major bleeding event: No  Thrombosis signs/symptoms: No  Thromboembolic event: No  Missed doses: No  Extra doses: No  Medication changes: No  Dietary changes: No  Change in health: No  Change in activity: No  Alcohol: No  Other concerns: No    Upcoming Procedures:  Does the Patient Have any upcoming procedures that require interruption in anticoagulation therapy? no  Does the patient require bridging? no      Anticoagulation Summary  As of 2025      INR goal:  2.0-3.0   TTR:  69.8% (1.8 y)   INR used for dosin.30 (2025)   Weekly warfarin total:  42.5 mg               Assessment/Plan   THERAPEUTIC  1. New dose: SPOKE TO PT. WILL MAINTAIN TWD. PT REPEATED DOSING INSTRUCTIONS CORRECTLY    2. Next INR: 1 week      Education provided to patient during the visit:  Patient instructed to call in interim with questions, concerns and changes.   Patient educated on compliance with dosing, follow up appointments, and prescribed plan of care.

## 2025-07-09 ENCOUNTER — ANTICOAGULATION - WARFARIN VISIT (OUTPATIENT)
Dept: CARDIOLOGY | Facility: CLINIC | Age: 75
End: 2025-07-09
Payer: MEDICARE

## 2025-07-09 DIAGNOSIS — I48.11 LONGSTANDING PERSISTENT ATRIAL FIBRILLATION (MULTI): ICD-10-CM

## 2025-07-09 DIAGNOSIS — Z95.2 S/P AVR (AORTIC VALVE REPLACEMENT): Primary | ICD-10-CM

## 2025-07-09 NOTE — PROGRESS NOTES
Patient identification verified with 2 identifiers.    Location: [facility] Patient Self-Testing Program [phone]    Referring Physician: DR. JAIN  Enrollment/ Re-enrollment date: 25   INR Goal: 2.0-3.0  INR monitoring is per [facility] protocol.  Anticoagulation Medication: warfarin  Indication: Aortic Valve Replacement    Subjective   Bleeding signs/symptoms: No    Bruising: No   Major bleeding event: No  Thrombosis signs/symptoms: No  Thromboembolic event: No  Missed doses: No  Extra doses: No  Medication changes: No  Dietary changes: No  Change in health: No  Change in activity: No  Alcohol: No  Other concerns: No    Upcoming Procedures:  Does the Patient Have any upcoming procedures that require interruption in anticoagulation therapy? no  Does the patient require bridging? no      Anticoagulation Summary  As of 2025      INR goal:  2.0-3.0   TTR:  70.1% (1.8 y)   INR used for dosin.20 (2025)   Weekly warfarin total:  42.5 mg               Assessment/Plan   Therapeutic     1. New dose: no change    2. Next INR: 1 week  I SPOKE TO PT CONFIRMING CURRENT WARFARIN DOSING.   PT WILL MAINTAIN  WEEKLY DOSE SCHEDULE.  PT VERBALIZED UNDERSTANDING OF THESE DOSING INSTRUCTIONS.    Education provided to patient during the visit:  Patient instructed to call in interim with questions, concerns and changes.   Patient educated on signs of bleeding/clotting.

## 2025-07-16 ENCOUNTER — ANTICOAGULATION - WARFARIN VISIT (OUTPATIENT)
Dept: CARDIOLOGY | Facility: CLINIC | Age: 75
End: 2025-07-16
Payer: MEDICARE

## 2025-07-16 DIAGNOSIS — I48.11 LONGSTANDING PERSISTENT ATRIAL FIBRILLATION (MULTI): ICD-10-CM

## 2025-07-16 DIAGNOSIS — Z95.2 S/P AVR (AORTIC VALVE REPLACEMENT): Primary | ICD-10-CM

## 2025-07-16 NOTE — PROGRESS NOTES
Patient identification verified with 2 identifiers.    Location: [facility] Patient Self-Testing Program [phone]    Referring Physician: DR. JAIN  Enrollment/ Re-enrollment date: 25   INR Goal: 2.0-3.0  INR monitoring is per [facility] protocol.  Anticoagulation Medication: warfarin  Indication: Aortic Valve Replacement    Subjective   Bleeding signs/symptoms: No    Bruising: No   Major bleeding event: No  Thrombosis signs/symptoms: No  Thromboembolic event: No  Missed doses: No  Extra doses: No  Medication changes: No  Dietary changes: No  Change in health: No  Change in activity: No  Alcohol: No  Other concerns: No    Upcoming Procedures:  Does the Patient Have any upcoming procedures that require interruption in anticoagulation therapy? no  Does the patient require bridging? no      Anticoagulation Summary  As of 2025      INR goal:  2.0-3.0   TTR:  70.1% (1.8 y)   INR used for dosin.90 (2025)   Weekly warfarin total:  45 mg               Assessment/Plan   Subtherapeutic     1. New dose: WILL INCREASE WEEKLY DOSE.    2. Next INR: 1 week  I SPOKE TO PT CONFIRMING CURRENT WARFARIN DOSING.   PT WILL INCREASE  WEEKLY DOSE SCHEDULE.  PT VERBALIZED UNDERSTANDING OF THESE DOSING INSTRUCTIONS    Education provided to patient during the visit:  Patient instructed to call in interim with questions, concerns and changes.   Patient educated on signs of bleeding/clotting.

## 2025-07-23 ENCOUNTER — ANTICOAGULATION - WARFARIN VISIT (OUTPATIENT)
Dept: CARDIOLOGY | Facility: CLINIC | Age: 75
End: 2025-07-23
Payer: MEDICARE

## 2025-07-23 DIAGNOSIS — Z95.2 S/P AVR (AORTIC VALVE REPLACEMENT): Primary | ICD-10-CM

## 2025-07-23 DIAGNOSIS — I48.11 LONGSTANDING PERSISTENT ATRIAL FIBRILLATION (MULTI): ICD-10-CM

## 2025-07-23 NOTE — PROGRESS NOTES
Patient identification verified with 2 identifiers.    Location: [facility] Patient Self-Testing Program [phone]    Referring Physician: Dr Silas Lawson  Enrollment/ Re-enrollment date: 2025   INR Goal: 2.0-3.0  INR monitoring is per [facility] protocol.  Anticoagulation Medication: warfarin  Indication: Aortic Valve Replacement    Received faxed INR self-test result, called patient and spoke to patient.       Subjective   Bleeding signs/symptoms: No    Bruising: No   Major bleeding event: No  Thrombosis signs/symptoms: No  Thromboembolic event: No  Missed doses: No  Extra doses: No  Medication changes: No  Dietary changes: No  Change in health: No  Change in activity: No  Alcohol: No  Other concerns: No    Upcoming Procedures:  Does the Patient Have any upcoming procedures that require interruption in anticoagulation therapy? no  Does the patient require bridging? no      Anticoagulation Summary  As of 2025      INR goal:  2.0-3.0   TTR:  70.2% (1.8 y)   INR used for dosin.40 (2025)   Weekly warfarin total:  45 mg               Assessment/Plan   Therapeutic     1. New dose: no change  Reviewed current dosage schedule and patient read back dosing correctly.  Patient instructed to call PST voicemail at [phone] in interim with questions, concerns and changes.   2. Next INR: 1 week      Education provided to patient during the visit:  Patient instructed to call in interim with questions, concerns and changes.   Patient educated on interactions between medications and warfarin.   Patient educated on dietary consistency in vitamin k consumption.   Patient educated on affects of alcohol consumption while taking warfarin.   Patient educated on signs of bleeding/clotting.   Patient educated on compliance with dosing, follow up appointments, and prescribed plan of care.

## 2025-07-26 PROCEDURE — RXMED WILLOW AMBULATORY MEDICATION CHARGE

## 2025-07-30 ENCOUNTER — PHARMACY VISIT (OUTPATIENT)
Dept: PHARMACY | Facility: CLINIC | Age: 75
End: 2025-07-30
Payer: MEDICARE

## 2025-07-30 ENCOUNTER — ANTICOAGULATION - WARFARIN VISIT (OUTPATIENT)
Dept: CARDIOLOGY | Facility: CLINIC | Age: 75
End: 2025-07-30
Payer: MEDICARE

## 2025-07-30 DIAGNOSIS — Z95.2 S/P AVR (AORTIC VALVE REPLACEMENT): Primary | ICD-10-CM

## 2025-07-30 DIAGNOSIS — I48.11 LONGSTANDING PERSISTENT ATRIAL FIBRILLATION (MULTI): ICD-10-CM

## 2025-07-30 NOTE — PROGRESS NOTES
Patient identification verified with 2 identifiers.    Location: [facility] Patient Self-Testing Program [phone]    Referring Physician: Dr. Silas Lawson  Enrollment/ Re-enrollment date: 25   INR Goal: 2.0-3.0  INR monitoring is per [facility] protocol.  Anticoagulation Medication: warfarin  Indication: Atrial Fibrillation/Atrial Flutter and Aortic Valve Replacement    Subjective   Bleeding signs/symptoms: No    Bruising: No   Major bleeding event: No  Thrombosis signs/symptoms: No  Thromboembolic event: No  Missed doses: No  Extra doses: No  Medication changes: No  Dietary changes: No  Change in health: No  Change in activity: No  Alcohol: No  Other concerns: No    Upcoming Procedures:  Does the Patient Have any upcoming procedures that require interruption in anticoagulation therapy? no  Does the patient require bridging? no      Anticoagulation Summary  As of 2025      INR goal:  2.0-3.0   TTR:  70.5% (1.8 y)   INR used for dosin.30 (2025)   Weekly warfarin total:  45 mg               Assessment/Plan   Therapeutic     1. New dose: no change    2. Next INR: 2 weeks      Education provided to patient during the visit:  Patient instructed to call in interim with questions, concerns and changes.   Patient educated on interactions between medications and warfarin.   Patient educated on dietary consistency in vitamin k consumption.   Patient educated on affects of alcohol consumption while taking warfarin.   Patient educated on signs of bleeding/clotting.   Patient educated on compliance with dosing, follow up appointments, and prescribed plan of care.

## 2025-08-04 ENCOUNTER — TELEPHONE (OUTPATIENT)
Dept: PRIMARY CARE | Facility: CLINIC | Age: 75
End: 2025-08-04
Payer: MEDICARE

## 2025-08-13 ENCOUNTER — ANTICOAGULATION - WARFARIN VISIT (OUTPATIENT)
Dept: CARDIOLOGY | Facility: CLINIC | Age: 75
End: 2025-08-13
Payer: MEDICARE

## 2025-08-13 DIAGNOSIS — I48.11 LONGSTANDING PERSISTENT ATRIAL FIBRILLATION (MULTI): ICD-10-CM

## 2025-08-13 DIAGNOSIS — Z95.2 S/P AVR (AORTIC VALVE REPLACEMENT): Primary | ICD-10-CM

## 2025-08-19 ENCOUNTER — APPOINTMENT (OUTPATIENT)
Dept: PHARMACY | Facility: HOSPITAL | Age: 75
End: 2025-08-19
Payer: MEDICARE

## 2025-08-19 DIAGNOSIS — E66.9 OBESITY (BMI 30-39.9): ICD-10-CM

## 2025-08-19 DIAGNOSIS — I25.10 CORONARY ARTERY DISEASE INVOLVING NATIVE HEART WITHOUT ANGINA PECTORIS, UNSPECIFIED VESSEL OR LESION TYPE: ICD-10-CM

## 2025-08-19 PROCEDURE — RXMED WILLOW AMBULATORY MEDICATION CHARGE

## 2025-08-19 RX ORDER — SEMAGLUTIDE 2.4 MG/.75ML
2.4 INJECTION, SOLUTION SUBCUTANEOUS WEEKLY
Qty: 3 ML | Refills: 11 | Status: SHIPPED | OUTPATIENT
Start: 2025-08-19 | End: 2025-09-18

## 2025-08-20 ENCOUNTER — ANTICOAGULATION - WARFARIN VISIT (OUTPATIENT)
Dept: CARDIOLOGY | Facility: CLINIC | Age: 75
End: 2025-08-20
Payer: MEDICARE

## 2025-08-20 DIAGNOSIS — I48.11 LONGSTANDING PERSISTENT ATRIAL FIBRILLATION (MULTI): ICD-10-CM

## 2025-08-20 DIAGNOSIS — Z95.2 S/P AVR (AORTIC VALVE REPLACEMENT): Primary | ICD-10-CM

## 2025-08-25 ENCOUNTER — PHARMACY VISIT (OUTPATIENT)
Dept: PHARMACY | Facility: CLINIC | Age: 75
End: 2025-08-25
Payer: MEDICARE

## 2025-09-03 ENCOUNTER — ANTICOAGULATION - WARFARIN VISIT (OUTPATIENT)
Dept: CARDIOLOGY | Facility: CLINIC | Age: 75
End: 2025-09-03
Payer: MEDICARE

## 2025-09-03 DIAGNOSIS — I48.11 LONGSTANDING PERSISTENT ATRIAL FIBRILLATION (MULTI): ICD-10-CM

## 2025-09-03 DIAGNOSIS — Z95.2 S/P AVR (AORTIC VALVE REPLACEMENT): Primary | ICD-10-CM

## 2025-09-03 LAB
INR IN PPP BY COAGULATION ASSAY EXTERNAL: 4.8
PROTHROMBIN TIME (PT) IN PPP BY COAGULATION ASSAY EXTERNAL: NORMAL

## 2025-10-14 ENCOUNTER — APPOINTMENT (OUTPATIENT)
Dept: PHARMACY | Facility: HOSPITAL | Age: 75
End: 2025-10-14
Payer: MEDICARE

## 2025-10-22 ENCOUNTER — APPOINTMENT (OUTPATIENT)
Dept: PRIMARY CARE | Facility: CLINIC | Age: 75
End: 2025-10-22
Payer: MEDICARE